# Patient Record
Sex: MALE | Race: WHITE | NOT HISPANIC OR LATINO | URBAN - METROPOLITAN AREA
[De-identification: names, ages, dates, MRNs, and addresses within clinical notes are randomized per-mention and may not be internally consistent; named-entity substitution may affect disease eponyms.]

---

## 2017-01-16 RX ORDER — SERTRALINE HYDROCHLORIDE 100 MG/1
TABLET, FILM COATED ORAL
Qty: 90 TABLET | Refills: 3 | Status: SHIPPED | OUTPATIENT
Start: 2017-01-16 | End: 2017-12-28 | Stop reason: SDUPTHER

## 2017-02-07 DIAGNOSIS — Z00.00 HEALTH CARE MAINTENANCE: Primary | ICD-10-CM

## 2017-02-07 DIAGNOSIS — I10 ESSENTIAL HYPERTENSION: ICD-10-CM

## 2017-02-08 LAB
ALBUMIN SERPL-MCNC: 4.5 G/DL (ref 3.5–5.2)
ALBUMIN/GLOB SERPL: 2.1 G/DL
ALP SERPL-CCNC: 83 U/L (ref 39–117)
ALT SERPL-CCNC: 26 U/L (ref 1–41)
APPEARANCE UR: CLEAR
AST SERPL-CCNC: 23 U/L (ref 1–40)
BACTERIA #/AREA URNS HPF: NORMAL /HPF
BASOPHILS # BLD AUTO: 0.07 10*3/MM3 (ref 0–0.2)
BASOPHILS NFR BLD AUTO: 1 % (ref 0–1.5)
BILIRUB SERPL-MCNC: 0.2 MG/DL (ref 0.1–1.2)
BILIRUB UR QL STRIP: NEGATIVE
BUN SERPL-MCNC: 13 MG/DL (ref 6–20)
BUN/CREAT SERPL: 15.3 (ref 7–25)
CALCIUM SERPL-MCNC: 9.6 MG/DL (ref 8.6–10.5)
CHLORIDE SERPL-SCNC: 99 MMOL/L (ref 98–107)
CHOLEST SERPL-MCNC: 268 MG/DL (ref 0–200)
CO2 SERPL-SCNC: 28.8 MMOL/L (ref 22–29)
COLOR UR: YELLOW
CREAT SERPL-MCNC: 0.85 MG/DL (ref 0.76–1.27)
EOSINOPHIL # BLD AUTO: 0.86 10*3/MM3 (ref 0–0.7)
EOSINOPHIL NFR BLD AUTO: 12.4 % (ref 0.3–6.2)
EPI CELLS #/AREA URNS HPF: NORMAL /HPF
ERYTHROCYTE [DISTWIDTH] IN BLOOD BY AUTOMATED COUNT: 12.6 % (ref 11.5–14.5)
GLOBULIN SER CALC-MCNC: 2.1 GM/DL
GLUCOSE SERPL-MCNC: 65 MG/DL (ref 65–99)
GLUCOSE UR QL: NEGATIVE
HBA1C MFR BLD: 5.3 % (ref 4.8–5.6)
HCT VFR BLD AUTO: 39.1 % (ref 40.4–52.2)
HDLC SERPL-MCNC: 61 MG/DL (ref 40–60)
HGB BLD-MCNC: 12.9 G/DL (ref 13.7–17.6)
HGB UR QL STRIP: NEGATIVE
IMM GRANULOCYTES # BLD: 0.02 10*3/MM3 (ref 0–0.03)
IMM GRANULOCYTES NFR BLD: 0.3 % (ref 0–0.5)
KETONES UR QL STRIP: NEGATIVE
LDLC SERPL CALC-MCNC: 174 MG/DL (ref 0–100)
LEUKOCYTE ESTERASE UR QL STRIP: NEGATIVE
LYMPHOCYTES # BLD AUTO: 2.34 10*3/MM3 (ref 0.9–4.8)
LYMPHOCYTES NFR BLD AUTO: 33.7 % (ref 19.6–45.3)
Lab: NORMAL
Lab: NORMAL
MCH RBC QN AUTO: 31.1 PG (ref 27–32.7)
MCHC RBC AUTO-ENTMCNC: 33 G/DL (ref 32.6–36.4)
MCV RBC AUTO: 94.2 FL (ref 79.8–96.2)
MICRO URNS: NORMAL
MICRO URNS: NORMAL
MONOCYTES # BLD AUTO: 0.69 10*3/MM3 (ref 0.2–1.2)
MONOCYTES NFR BLD AUTO: 9.9 % (ref 5–12)
MUCOUS THREADS URNS QL MICRO: PRESENT /HPF
NEUTROPHILS # BLD AUTO: 2.97 10*3/MM3 (ref 1.9–8.1)
NEUTROPHILS NFR BLD AUTO: 42.7 % (ref 42.7–76)
NITRITE UR QL STRIP: NEGATIVE
PH UR STRIP: 5.5 [PH] (ref 5–7.5)
PLATELET # BLD AUTO: 313 10*3/MM3 (ref 140–500)
POTASSIUM SERPL-SCNC: 4.3 MMOL/L (ref 3.5–5.2)
PROT SERPL-MCNC: 6.6 G/DL (ref 6–8.5)
PROT UR QL STRIP: NEGATIVE
RBC # BLD AUTO: 4.15 10*6/MM3 (ref 4.6–6)
RBC #/AREA URNS HPF: NORMAL /HPF
SODIUM SERPL-SCNC: 142 MMOL/L (ref 136–145)
SP GR UR: 1.03 (ref 1–1.03)
TRIGL SERPL-MCNC: 165 MG/DL (ref 0–150)
TSH SERPL DL<=0.005 MIU/L-ACNC: 1.39 MIU/ML (ref 0.27–4.2)
URINALYSIS REFLEX: NORMAL
UROBILINOGEN UR STRIP-MCNC: 0.2 MG/DL (ref 0.2–1)
VLDLC SERPL CALC-MCNC: 33 MG/DL (ref 5–40)
WBC # BLD AUTO: 6.95 10*3/MM3 (ref 4.5–10.7)
WBC #/AREA URNS HPF: NORMAL /HPF

## 2017-02-09 LAB
FERRITIN SERPL-MCNC: 92.75 NG/ML (ref 30–400)
IRON SATN MFR SERPL: 28 % (ref 20–50)
IRON SERPL-MCNC: 108 MCG/DL (ref 59–158)
TIBC SERPL-MCNC: 385 MCG/DL
UIBC SERPL-MCNC: 277 MCG/DL
VIT B12 SERPL-MCNC: 333 PG/ML (ref 211–946)
WRITTEN AUTHORIZATION: NORMAL

## 2017-02-14 ENCOUNTER — OFFICE VISIT (OUTPATIENT)
Dept: INTERNAL MEDICINE | Facility: CLINIC | Age: 47
End: 2017-02-14

## 2017-02-14 VITALS
WEIGHT: 195 LBS | HEIGHT: 76 IN | BODY MASS INDEX: 23.75 KG/M2 | DIASTOLIC BLOOD PRESSURE: 80 MMHG | RESPIRATION RATE: 18 BRPM | HEART RATE: 65 BPM | SYSTOLIC BLOOD PRESSURE: 134 MMHG | OXYGEN SATURATION: 98 %

## 2017-02-14 DIAGNOSIS — E53.8 LOW VITAMIN B12 LEVEL: ICD-10-CM

## 2017-02-14 DIAGNOSIS — Z00.00 HEALTH MAINTENANCE EXAMINATION: Primary | ICD-10-CM

## 2017-02-14 DIAGNOSIS — D53.9 MACROCYTIC ANEMIA: ICD-10-CM

## 2017-02-14 DIAGNOSIS — I10 ESSENTIAL HYPERTENSION: ICD-10-CM

## 2017-02-14 DIAGNOSIS — J45.20 MILD INTERMITTENT ASTHMA WITHOUT COMPLICATION: ICD-10-CM

## 2017-02-14 PROBLEM — R79.89 LOW VITAMIN B12 LEVEL: Status: ACTIVE | Noted: 2017-02-14

## 2017-02-14 PROBLEM — D75.89 MACROCYTOSIS WITHOUT ANEMIA: Status: ACTIVE | Noted: 2017-02-14

## 2017-02-14 PROCEDURE — 99396 PREV VISIT EST AGE 40-64: CPT | Performed by: INTERNAL MEDICINE

## 2017-02-14 NOTE — PROGRESS NOTES
Chief Complaint  Jermaine Pichardo is a 47 y.o. male who presents for Annual Exam (CPE)  .    History of Present Illness   Jermaine is here for routine CPE.  His left ear has been bothering him.  It feels like it might be impacted.  No other new concerns.    Pneumovax - not yet.    Exercise: just his work.      For a year he has had hot flashes and sweats.  It can be day or night.  He could go for a week without any but then it will happen.  It lasts about 2 minutes.  He is still on suboxone from Dr. Burroughs.      Immunization History   Administered Date(s) Administered   • Td 03/01/2009       Review of Systems   Constitutional: Negative for chills, fatigue and fever.   HENT: Positive for ear pain. Negative for hearing loss, sore throat, tinnitus, trouble swallowing and voice change.    Eyes: Negative for visual disturbance.   Respiratory: Positive for shortness of breath (occasionally). Negative for cough.    Cardiovascular: Negative for chest pain, palpitations and leg swelling.   Gastrointestinal: Negative for abdominal distention, abdominal pain, anal bleeding, blood in stool, constipation, diarrhea, nausea and vomiting.   Endocrine: Negative for polydipsia and polyuria.   Genitourinary: Negative for decreased urine volume, dysuria and hematuria.   Musculoskeletal: Negative for arthralgias and myalgias.   Skin: Negative for rash.   Neurological: Negative for dizziness, syncope, light-headedness and headaches.   Hematological: Negative for adenopathy. Does not bruise/bleed easily.   Psychiatric/Behavioral: Negative for confusion and dysphoric mood. The patient is nervous/anxious.        Patient Active Problem List   Diagnosis   • Actinic keratosis   • Anxiety   • Asthma   • Essential hypertension   • Primary insomnia   • Macrocytic anemia   • Low vitamin B12 level       Past Medical History   Diagnosis Date   • Actinic keratosis    • Anxiety    • Asthma        Past Surgical History   Procedure Laterality Date   • Mandible  "surgery         Family History   Problem Relation Age of Onset   • Diabetes Mother    • Colon cancer Paternal Grandfather        Social History     Social History   • Marital status:      Spouse name: N/A   • Number of children: 3   • Years of education: N/A     Occupational History   • [employer] repairman [employer]      Social History Main Topics   • Smoking status: Current Every Day Smoker     Types: Cigarettes   • Smokeless tobacco: Never Used      Comment: started at age 41   • Alcohol use Yes      Comment: 7 / week   • Drug use: No   • Sexual activity: Not on file     Other Topics Concern   • Not on file     Social History Narrative       Current Outpatient Prescriptions on File Prior to Visit   Medication Sig Dispense Refill   • lisinopril (PRINIVIL,ZESTRIL) 5 MG tablet Take 1 tablet by mouth daily. 90 tablet 3   • PROAIR  (90 BASE) MCG/ACT inhaler INHALE 1 PUFF BY MOUTH EVERY 4 HOURS AS NEEDED 8.5 inhaler 3   • sertraline (ZOLOFT) 100 MG tablet TAKE 1 TABLET EVERY DAY 90 tablet 3   • SUBOXONE 8-2 MG film film DISSOLVE 1 AND 1/2 FILM UNDER THE TONGUE EVERY DAY  0   • [DISCONTINUED] melatonin 5 MG tablet tablet Take 1 tablet (5 mg total) by mouth nightly.  0   • [DISCONTINUED] montelukast (SINGULAIR) 10 MG tablet Take 1 tablet by mouth daily.       No current facility-administered medications on file prior to visit.        No Known Allergies    Vitals:    02/14/17 1255   BP: 134/80   Pulse: 65   Resp: 18   SpO2: 98%   Weight: 195 lb (88.5 kg)   Height: 76\" (193 cm)       Body mass index is 23.74 kg/(m^2).    Objective   Physical Exam   Constitutional: He is oriented to person, place, and time. He appears well-developed and well-nourished. No distress.   HENT:   Head: Normocephalic and atraumatic.   Right Ear: Hearing and external ear normal.   Left Ear: Hearing and external ear normal.   Nose: Nose normal.   Mouth/Throat: Oropharynx is clear and moist and mucous membranes are normal.   Left cerumen " impaction   Eyes: Conjunctivae, EOM and lids are normal. Pupils are equal, round, and reactive to light. No scleral icterus.   Neck: Trachea normal and normal range of motion. Neck supple.   Cardiovascular: Normal rate, regular rhythm and normal heart sounds.  Exam reveals no gallop and no friction rub.    No murmur heard.  Pulses:       Carotid pulses are 2+ on the right side, and 2+ on the left side.       Femoral pulses are 2+ on the right side, and 2+ on the left side.       Dorsalis pedis pulses are 2+ on the right side, and 2+ on the left side.        Posterior tibial pulses are 2+ on the right side, and 2+ on the left side.   Pulmonary/Chest: Effort normal and breath sounds normal. No respiratory distress. He has no wheezes. He has no rales.   Abdominal: Soft. Normal appearance and bowel sounds are normal. He exhibits no distension and no mass. There is no tenderness.   Musculoskeletal: Normal range of motion. He exhibits no edema.       Vascular Status -  His exam exhibits no right foot edema. His exam exhibits no left foot edema.   Skin Integrity  -  His right foot skin is intact.     Jermaine 's left foot skin is intact. .  Lymphadenopathy:     He has no cervical adenopathy.        Right: No inguinal and no supraclavicular adenopathy present.        Left: No inguinal and no supraclavicular adenopathy present.   Neurological: He is alert and oriented to person, place, and time. He has normal strength. No cranial nerve deficit. He exhibits normal muscle tone. Coordination and gait normal.   Skin: Skin is warm and dry. No rash noted.   Psychiatric: He has a normal mood and affect. His speech is normal and behavior is normal. Judgment and thought content normal. Cognition and memory are normal.   Vitals reviewed.        Results for orders placed or performed in visit on 02/07/17   Comprehensive Metabolic Panel   Result Value Ref Range    Glucose 65 65 - 99 mg/dL    BUN 13 6 - 20 mg/dL    Creatinine 0.85 0.76 -  1.27 mg/dL    eGFR Non African Am 97 >60 mL/min/1.73    eGFR African Am 118 >60 mL/min/1.73    BUN/Creatinine Ratio 15.3 7.0 - 25.0    Sodium 142 136 - 145 mmol/L    Potassium 4.3 3.5 - 5.2 mmol/L    Chloride 99 98 - 107 mmol/L    Total CO2 28.8 22.0 - 29.0 mmol/L    Calcium 9.6 8.6 - 10.5 mg/dL    Total Protein 6.6 6.0 - 8.5 g/dL    Albumin 4.50 3.50 - 5.20 g/dL    Globulin 2.1 gm/dL    A/G Ratio 2.1 g/dL    Total Bilirubin 0.2 0.1 - 1.2 mg/dL    Alkaline Phosphatase 83 39 - 117 U/L    AST (SGOT) 23 1 - 40 U/L    ALT (SGPT) 26 1 - 41 U/L   Lipid Panel   Result Value Ref Range    Total Cholesterol 268 (H) 0 - 200 mg/dL    Triglycerides 165 (H) 0 - 150 mg/dL    HDL Cholesterol 61 (H) 40 - 60 mg/dL    VLDL Cholesterol 33 5 - 40 mg/dL    LDL Cholesterol  174 (H) 0 - 100 mg/dL   TSH Rfx On Abnormal To Free T4   Result Value Ref Range    TSH 1.39 0.27 - 4.2 mIU/mL   UA / M With / Rflx Culture([facility] ONLY)   Result Value Ref Range    Specific Gravity, UA 1.026 1.005 - 1.030    pH, UA 5.5 5.0 - 7.5    Color, UA Yellow Yellow    Appearance, UA Clear Clear    Leukocytes, UA Negative Negative    Protein Negative Negative/Trace    Glucose, UA Negative Negative    Ketones Negative Negative    Blood, UA Negative Negative    Bilirubin, UA Negative Negative    Urobilinogen, UA 0.2 0.2 - 1.0 mg/dL    Nitrite, UA Negative Negative    Microscopic Examination Comment     MICROSCOPIC EXAMINATION See below:     Urinalysis Reflex Comment    Hemoglobin A1c   Result Value Ref Range    Hemoglobin A1C 5.30 4.80 - 5.60 %   Microscopic Examination   Result Value Ref Range    WBC, UA 0-5 0 - 5 /hpf    RBC, UA None seen 0 - 2 /hpf    Epithelial Cells (non renal) 0-10 0 - 10 /hpf    Mucus, UA Present Not Estab. /hpf    Bacteria, UA None seen None seen/Few /hpf   Verbal Order   Result Value Ref Range    See below: Comment:     Additional Test(s) Requested Comment:    Iron Profile   Result Value Ref Range    TIBC 385 mcg/dL    UIBC 277 mcg/dL     Iron 108 59 - 158 mcg/dL    Iron Saturation 28 20 - 50 %   Ferritin   Result Value Ref Range    Ferritin 92.75 30.00 - 400.00 ng/mL   Vitamin B12   Result Value Ref Range    Vitamin B-12 333 211 - 946 pg/mL   Written Authorization   Result Value Ref Range    Written Authorization Comment    CBC & Differential   Result Value Ref Range    WBC 6.95 4.50 - 10.70 10*3/mm3    RBC 4.15 (L) 4.60 - 6.00 10*6/mm3    Hemoglobin 12.9 (L) 13.7 - 17.6 g/dL    Hematocrit 39.1 (L) 40.4 - 52.2 %    MCV 94.2 79.8 - 96.2 fL    MCH 31.1 27.0 - 32.7 pg    MCHC 33.0 32.6 - 36.4 g/dL    RDW 12.6 11.5 - 14.5 %    Platelets 313 140 - 500 10*3/mm3    Neutrophil Rel % 42.7 42.7 - 76.0 %    Lymphocyte Rel % 33.7 19.6 - 45.3 %    Monocyte Rel % 9.9 5.0 - 12.0 %    Eosinophil Rel % 12.4 (H) 0.3 - 6.2 %    Basophil Rel % 1.0 0.0 - 1.5 %    Neutrophils Absolute 2.97 1.90 - 8.10 10*3/mm3    Lymphocytes Absolute 2.34 0.90 - 4.80 10*3/mm3    Monocytes Absolute 0.69 0.20 - 1.20 10*3/mm3    Eosinophils Absolute 0.86 (H) 0.00 - 0.70 10*3/mm3    Basophils Absolute 0.07 0.00 - 0.20 10*3/mm3    Immature Granulocyte Rel % 0.3 0.0 - 0.5 %    Immature Grans Absolute 0.02 0.00 - 0.03 10*3/mm3       Assessment/Plan   Diagnoses and all orders for this visit:    Health maintenance examination  -     Cancel: Pneumococcal Polysaccharide Vaccine 23-Valent Greater Than or Equal To 3yo Subcutaneous / IM    Macrocytic anemia  -     CBC & Differential; Future  -     Vitamin B12; Future    Low vitamin B12 level  -     CBC & Differential; Future  -     Vitamin B12; Future    Essential hypertension    Mild intermittent asthma without complication        Discussion/Summary  Jermaine is here for routine CPE.  His bp is controlled.  His asthma has been well controlled.  We discussed that his eosinophil count is elevated but this is not uncommon with asthma.   He has a new macrocytosis and his B12 is mildly low.  Iron levels are normal.   I've asked him to take 1000 mcg OTC B12  SL daily.  We will recheck labs in 6 weeks.  He is also complaining of flushing and sweats.  I suspect this is secondary to his suboxone.  I had wanted him to get a pneumovax but we are out of this today.  He will hopefully be able to get this is six weeks when he comes in for labs.    Return in about 6 weeks (around 3/28/2017) for labs only & ear wax removal.

## 2017-03-28 ENCOUNTER — RESULTS ENCOUNTER (OUTPATIENT)
Dept: INTERNAL MEDICINE | Facility: CLINIC | Age: 47
End: 2017-03-28

## 2017-03-28 DIAGNOSIS — D53.9 MACROCYTIC ANEMIA: ICD-10-CM

## 2017-03-28 DIAGNOSIS — E53.8 LOW VITAMIN B12 LEVEL: ICD-10-CM

## 2017-03-30 LAB
BASOPHILS # BLD AUTO: 0.09 10*3/MM3 (ref 0–0.2)
BASOPHILS NFR BLD AUTO: 0.9 % (ref 0–1.5)
EOSINOPHIL # BLD AUTO: 0.99 10*3/MM3 (ref 0–0.7)
EOSINOPHIL NFR BLD AUTO: 9.5 % (ref 0.3–6.2)
ERYTHROCYTE [DISTWIDTH] IN BLOOD BY AUTOMATED COUNT: 12.7 % (ref 11.5–14.5)
HCT VFR BLD AUTO: 41.2 % (ref 40.4–52.2)
HGB BLD-MCNC: 13.9 G/DL (ref 13.7–17.6)
IMM GRANULOCYTES # BLD: 0 10*3/MM3 (ref 0–0.03)
IMM GRANULOCYTES NFR BLD: 0 % (ref 0–0.5)
LYMPHOCYTES # BLD AUTO: 1.99 10*3/MM3 (ref 0.9–4.8)
LYMPHOCYTES NFR BLD AUTO: 19.1 % (ref 19.6–45.3)
MCH RBC QN AUTO: 31.8 PG (ref 27–32.7)
MCHC RBC AUTO-ENTMCNC: 33.7 G/DL (ref 32.6–36.4)
MCV RBC AUTO: 94.3 FL (ref 79.8–96.2)
MONOCYTES # BLD AUTO: 0.67 10*3/MM3 (ref 0.2–1.2)
MONOCYTES NFR BLD AUTO: 6.4 % (ref 5–12)
NEUTROPHILS # BLD AUTO: 6.66 10*3/MM3 (ref 1.9–8.1)
NEUTROPHILS NFR BLD AUTO: 64.1 % (ref 42.7–76)
PLATELET # BLD AUTO: 304 10*3/MM3 (ref 140–500)
RBC # BLD AUTO: 4.37 10*6/MM3 (ref 4.6–6)
VIT B12 SERPL-MCNC: 437 PG/ML (ref 211–946)
WBC # BLD AUTO: 10.4 10*3/MM3 (ref 4.5–10.7)

## 2017-04-19 ENCOUNTER — TELEPHONE (OUTPATIENT)
Dept: INTERNAL MEDICINE | Facility: CLINIC | Age: 47
End: 2017-04-19

## 2017-04-19 NOTE — TELEPHONE ENCOUNTER
Pt called to cancel his apt, was sent across [facility] for work, called at 1:52pm to cancel.

## 2017-04-27 ENCOUNTER — OFFICE VISIT (OUTPATIENT)
Dept: INTERNAL MEDICINE | Facility: CLINIC | Age: 47
End: 2017-04-27

## 2017-04-27 VITALS
BODY MASS INDEX: 23.99 KG/M2 | HEIGHT: 76 IN | RESPIRATION RATE: 18 BRPM | SYSTOLIC BLOOD PRESSURE: 114 MMHG | DIASTOLIC BLOOD PRESSURE: 78 MMHG | HEART RATE: 81 BPM | OXYGEN SATURATION: 98 % | WEIGHT: 197 LBS

## 2017-04-27 DIAGNOSIS — E53.8 LOW VITAMIN B12 LEVEL: Primary | ICD-10-CM

## 2017-04-27 DIAGNOSIS — H61.22 IMPACTED CERUMEN OF LEFT EAR: ICD-10-CM

## 2017-04-27 DIAGNOSIS — R60.0 BILATERAL LOWER EXTREMITY EDEMA: ICD-10-CM

## 2017-04-27 DIAGNOSIS — Z23 NEED FOR PNEUMOCOCCAL VACCINATION: ICD-10-CM

## 2017-04-27 PROCEDURE — 90732 PPSV23 VACC 2 YRS+ SUBQ/IM: CPT | Performed by: INTERNAL MEDICINE

## 2017-04-27 PROCEDURE — 99213 OFFICE O/P EST LOW 20 MIN: CPT | Performed by: INTERNAL MEDICINE

## 2017-04-27 PROCEDURE — 90471 IMMUNIZATION ADMIN: CPT | Performed by: INTERNAL MEDICINE

## 2017-04-27 PROCEDURE — 69210 REMOVE IMPACTED EAR WAX UNI: CPT | Performed by: INTERNAL MEDICINE

## 2017-04-27 RX ORDER — LANOLIN ALCOHOL/MO/W.PET/CERES
1000 CREAM (GRAM) TOPICAL DAILY
Qty: 30 TABLET | Refills: 6
Start: 2017-04-27 | End: 2019-01-08

## 2017-04-27 NOTE — PATIENT INSTRUCTIONS
Pneumococcal Vaccine, Polyvalent solution for injection  What is this medicine?  PNEUMOCOCCAL VACCINE, POLYVALENT (DARCI mo LESLEE al alejandra BENZ, deric vicente) is a vaccine to prevent pneumococcus bacteria infection. These bacteria are a major cause of ear infections, Strep throat infections, and serious pneumonia, meningitis, or blood infections worldwide. These vaccines help the body to produce antibodies (protective substances) that help your body defend against these bacteria. This vaccine is recommended for people 2 years of age and older with health problems. It is also recommended for all adults over 50 years old. This vaccine will not treat an infection.  This medicine may be used for other purposes; ask your health care provider or pharmacist if you have questions.  COMMON BRAND NAME(S): Pneumovax 23  What should I tell my health care provider before I take this medicine?  They need to know if you have any of these conditions:  -bleeding problems  -bone marrow or organ transplant  -cancer, Hodgkin's disease  -fever  -infection  -immune system problems  -low platelet count in the blood  -seizures  -an unusual or allergic reaction to pneumococcal vaccine, diphtheria toxoid, other vaccines, latex, other medicines, foods, dyes, or preservatives  -pregnant or trying to get pregnant  -breast-feeding  How should I use this medicine?  This vaccine is for injection into a muscle or under the skin. It is given by a health care professional.  A copy of Vaccine Information Statements will be given before each vaccination. Read this sheet carefully each time. The sheet may change frequently.  Talk to your pediatrician regarding the use of this medicine in children. While this drug may be prescribed for children as young as 2 years of age for selected conditions, precautions do apply.  Overdosage: If you think you have taken too much of this medicine contact a poison control center or emergency room at once.  NOTE: This  medicine is only for you. Do not share this medicine with others.  What if I miss a dose?  It is important not to miss your dose. Call your doctor or health care professional if you are unable to keep an appointment.  What may interact with this medicine?  -medicines for cancer chemotherapy  -medicines that suppress your immune function  -medicines that treat or prevent blood clots like warfarin, enoxaparin, and dalteparin  -steroid medicines like prednisone or cortisone  This list may not describe all possible interactions. Give your health care provider a list of all the medicines, herbs, non-prescription drugs, or dietary supplements you use. Also tell them if you smoke, drink alcohol, or use illegal drugs. Some items may interact with your medicine.  What should I watch for while using this medicine?  Mild fever and pain should go away in 3 days or less. Report any unusual symptoms to your doctor or health care professional.  What side effects may I notice from receiving this medicine?  Side effects that you should report to your doctor or health care professional as soon as possible:  -allergic reactions like skin rash, itching or hives, swelling of the face, lips, or tongue  -breathing problems  -confused  -fever over 102 degrees F  -pain, tingling, numbness in the hands or feet  -seizures  -unusual bleeding or bruising  -unusual muscle weakness  Side effects that usually do not require medical attention (report to your doctor or health care professional if they continue or are bothersome):  -aches and pains  -diarrhea  -fever of 102 degrees F or less  -headache  -irritable  -loss of appetite  -pain, tender at site where injected  -trouble sleeping  This list may not describe all possible side effects. Call your doctor for medical advice about side effects. You may report side effects to FDA at 1-989-FDA-9683.  Where should I keep my medicine?  This does not apply. This vaccine is given in a clinic, pharmacy,  "doctor's office, or other health care setting and will not be stored at home.  NOTE: This sheet is a summary. It may not cover all possible information. If you have questions about this medicine, talk to your doctor, pharmacist, or health care provider.     © 2017, Elsevier/Gold Standard. (2009-07-24 14:32:37)    Low-Sodium Eating Plan  Sodium raises blood pressure and causes water to be held in the body. Getting less sodium from food will help lower your blood pressure, reduce any swelling, and protect your heart, liver, and kidneys. We get sodium by adding salt (sodium chloride) to food. Most of our sodium comes from canned, boxed, and frozen foods. Restaurant foods, fast foods, and pizza are also very high in sodium. Even if you take medicine to lower your blood pressure or to reduce fluid in your body, getting less sodium from your food is important.  WHAT IS MY PLAN?  Most people should limit their sodium intake to 2,300 mg a day. Your health care provider recommends that you limit your sodium intake to __________ a day.   WHAT DO I NEED TO KNOW ABOUT THIS EATING PLAN?  For the low-sodium eating plan, you will follow these general guidelines:  · Choose foods with a % Daily Value for sodium of less than 5% (as listed on the food label).    · Use salt-free seasonings or herbs instead of table salt or sea salt.    · Check with your health care provider or pharmacist before using salt substitutes.    · Eat fresh foods.  · Eat more vegetables and fruits.  · Limit canned vegetables. If you do use them, rinse them well to decrease the sodium.    · Limit cheese to 1 oz (28 g) per day.     · Eat lower-sodium products, often labeled as \"lower sodium\" or \"no salt added.\"  · Avoid foods that contain monosodium glutamate (MSG). MSG is sometimes added to Chinese food and some canned foods.    · Check food labels (Nutrition Facts labels) on foods to learn how much sodium is in one serving.  · Eat more home-cooked food and " less restaurant, buffet, and fast food.   · When eating at a restaurant, ask that your food be prepared with less salt, or no salt if possible.    HOW DO I READ FOOD LABELS FOR SODIUM INFORMATION?  The Nutrition Facts label lists the amount of sodium in one serving of the food. If you eat more than one serving, you must multiply the listed amount of sodium by the number of servings.  Food labels may also identify foods as:  · Sodium free--Less than 5 mg in a serving.  · Very low sodium--35 mg or less in a serving.  · Low sodium--140 mg or less in a serving.  · Light in sodium--50% less sodium in a serving. For example, if a food that usually has 300 mg of sodium is changed to become light in sodium, it will have 150 mg of sodium.  · Reduced sodium--25% less sodium in a serving. For example, if a food that usually has 400 mg of sodium is changed to reduced sodium, it will have 300 mg of sodium.  WHAT FOODS CAN I EAT?  Grains   Low-sodium cereals, including oats, puffed wheat and rice, and shredded wheat cereals. Low-sodium crackers. Unsalted rice and pasta. Lower-sodium bread.   Vegetables   Frozen or fresh vegetables. Low-sodium or reduced-sodium canned vegetables. Low-sodium or reduced-sodium tomato sauce and paste. Low-sodium or reduced-sodium tomato and vegetable juices.   Fruits   Fresh, frozen, and canned fruit. Fruit juice.   Meat and Other Protein Products   Low-sodium canned tuna and salmon. Fresh or frozen meat, poultry, seafood, and fish. Lamb. Unsalted nuts. Dried beans, peas, and lentils without added salt. Unsalted canned beans. Homemade soups without salt. Eggs.   Dairy   Milk. Soy milk. Ricotta cheese. Low-sodium or reduced-sodium cheeses. Yogurt.   Condiments   Fresh and dried herbs and spices. Salt-free seasonings. Onion and garlic powders. Low-sodium varieties of mustard and ketchup. Fresh or refrigerated horseradish. Lemon juice.   Fats and Oils    Reduced-sodium salad dressings. Unsalted butter.     Other   Unsalted popcorn and pretzels.   The items listed above may not be a complete list of recommended foods or beverages. Contact your dietitian for more options.  WHAT FOODS ARE NOT RECOMMENDED?  Grains   Instant hot cereals. Bread stuffing, pancake, and biscuit mixes. Croutons. Seasoned rice or pasta mixes. Noodle soup cups. Boxed or frozen macaroni and cheese. Self-rising flour. Regular salted crackers.  Vegetables   Regular canned vegetables. Regular canned tomato sauce and paste. Regular tomato and vegetable juices. Frozen vegetables in sauces. Salted French fries. Olives. Pickles. Relishes. Sauerkraut. Salsa.  Meat and Other Protein Products   Salted, canned, smoked, spiced, or pickled meats, seafood, or fish. Mclean, ham, sausage, hot dogs, corned beef, chipped beef, and packaged luncheon meats. Salt pork. Jerky. Pickled herring. Anchovies, regular canned tuna, and sardines. Salted nuts.  Dairy   Processed cheese and cheese spreads. Cheese curds. Blue cheese and cottage cheese. Buttermilk.   Condiments   Onion and garlic salt, seasoned salt, table salt, and sea salt. Canned and packaged gravies. Worcestershire sauce. Tartar sauce. Barbecue sauce. Teriyaki sauce. Soy sauce, including reduced sodium. Steak sauce. Fish sauce. Oyster sauce. Cocktail sauce. Horseradish that you find on the shelf. Regular ketchup and mustard. Meat flavorings and tenderizers. Bouillon cubes. Hot sauce. Tabasco sauce. Marinades. Taco seasonings. Relishes.  Fats and Oils    Regular salad dressings. Salted butter. Margarine. Ghee. Mclean fat.   Other   Potato and tortilla chips. Corn chips and puffs. Salted popcorn and pretzels. Canned or dried soups. Pizza. Frozen entrees and pot pies.    The items listed above may not be a complete list of foods and beverages to avoid. Contact your dietitian for more information.     This information is not intended to replace advice given to you by your health care provider. Make sure you  discuss any questions you have with your health care provider.     Document Released: 06/09/2003 Document Revised: 01/08/2016 Document Reviewed: 10/22/2014  Elsevier Interactive Patient Education ©2016 [employer]

## 2017-04-27 NOTE — PROGRESS NOTES
Chief Complaint  Jermaine Pichardo is a 47 y.o. male who presents for Cerumen Impaction (earwax removal); Results (recheck cbc, b12); and Follow-up (6 week)  .    History of Present Illness   He is here for follow up.  On his B12 and CBC.  He has been taking oral B12.  He has noticed some swelling in his ankles and feet.  Recently, this has gotten a little worse.  He is on his feet all day for work.  He eats a lot of salt.  He adds salt to his food all the time.        He has been working to get the wax out of his left ear and has been able to get some out.      Review of Systems   Constitutional: Positive for diaphoresis. Negative for fatigue.   HENT: Negative for ear discharge and ear pain.    Cardiovascular: Positive for leg swelling. Negative for chest pain and palpitations.       Patient Active Problem List   Diagnosis   • Actinic keratosis   • Anxiety   • Asthma   • Essential hypertension   • Primary insomnia   • Macrocytic anemia   • Low vitamin B12 level       Past Medical History:   Diagnosis Date   • Actinic keratosis    • Anxiety    • Asthma        Past Surgical History:   Procedure Laterality Date   • MANDIBLE SURGERY         Family History   Problem Relation Age of Onset   • Diabetes Mother    • Colon cancer Paternal Grandfather        Social History     Social History   • Marital status:      Spouse name: N/A   • Number of children: 3   • Years of education: N/A     Occupational History   • 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  "90 tablet 3   • PROAIR  (90 BASE) MCG/ACT inhaler INHALE 1 PUFF BY MOUTH EVERY 4 HOURS AS NEEDED 8.5 inhaler 3   • sertraline (ZOLOFT) 100 MG tablet TAKE 1 TABLET EVERY DAY 90 tablet 3   • SUBOXONE 8-2 MG film film DISSOLVE 1 AND 1/2 FILM UNDER THE TONGUE EVERY DAY  0     No current facility-administered medications on file prior to visit.        No Known Allergies    Vitals:    04/27/17 0808   BP: 114/78   Pulse: 81   Resp: 18   SpO2: 98%   Weight: 197 lb (89.4 kg)   Height: 76\" (193 cm)       Body mass index is 23.98 kg/(m^2).    Objective   Physical Exam   Constitutional: He appears well-developed and well-nourished. No distress.   HENT:   Head: Normocephalic and atraumatic.   Right Ear: Tympanic membrane, external ear and ear canal normal.   Left Ear: Tympanic membrane, external ear and ear canal normal.   Cerumen obscuring the left TM initially.  Easily removed with curette.  Normal TM and canal noted.         Results for orders placed or performed in visit on 03/28/17   Vitamin B12   Result Value Ref Range    Vitamin B-12 437 211 - 946 pg/mL   CBC & Differential   Result Value Ref Range    WBC 10.40 4.50 - 10.70 10*3/mm3    RBC 4.37 (L) 4.60 - 6.00 10*6/mm3    Hemoglobin 13.9 13.7 - 17.6 g/dL    Hematocrit 41.2 40.4 - 52.2 %    MCV 94.3 79.8 - 96.2 fL    MCH 31.8 27.0 - 32.7 pg    MCHC 33.7 32.6 - 36.4 g/dL    RDW 12.7 11.5 - 14.5 %    Platelets 304 140 - 500 10*3/mm3    Neutrophil Rel % 64.1 42.7 - 76.0 %    Lymphocyte Rel % 19.1 (L) 19.6 - 45.3 %    Monocyte Rel % 6.4 5.0 - 12.0 %    Eosinophil Rel % 9.5 (H) 0.3 - 6.2 %    Basophil Rel % 0.9 0.0 - 1.5 %    Neutrophils Absolute 6.66 1.90 - 8.10 10*3/mm3    Lymphocytes Absolute 1.99 0.90 - 4.80 10*3/mm3    Monocytes Absolute 0.67 0.20 - 1.20 10*3/mm3    Eosinophils Absolute 0.99 (H) 0.00 - 0.70 10*3/mm3    Basophils Absolute 0.09 0.00 - 0.20 10*3/mm3    Immature Granulocyte Rel % 0.0 0.0 - 0.5 %    Immature Grans Absolute 0.00 0.00 - 0.03 10*3/mm3 "     Ear Cerumen Removal Instrumentation  Date/Time: 4/27/2017 8:36 AM  Performed by: SAM AMANDA  Authorized by: SAM AMANDA  Consent: Verbal consent obtained.  Consent given by: patient  Patient understanding: patient states understanding of the procedure being performed  Patient identity confirmed: verbally with patient  Local anesthetic: none  Location details: left ear  Procedure type: curette  Patient sedated: no  Patient tolerance: Patient tolerated the procedure well with no immediate complications            Assessment/Plan   Diagnoses and all orders for this visit:    Low vitamin B12 level  -     vitamin B-12 (CYANOCOBALAMIN) 1000 MCG tablet; Take 1 tablet by mouth Daily.    Bilateral lower extremity edema    Impacted cerumen of left ear  -     Ear Cerumen Removal Instrumentation    Need for pneumococcal vaccination  -     Pneumococcal Polysaccharide Vaccine 23-Valent Greater Than or Equal To 1yo Subcutaneous / IM        Discussion/Summary  Jermaine is here for follow up on his blood counts.  His hgb did improve with oral B12.  Advised to continue the oral daily B12.  I have advised him to significantly reduce the salt in his diet.  He needs to stop adding salt to his food and to really look at the salt content of the food he is eating.  I suspect this plus being on his feet all day are contributing to his mild edema.  I have also advised him to look into athletic compression socks to help with the edema.    Return for Annual physical, with nonfasting labs prior.

## 2017-07-10 ENCOUNTER — TELEPHONE (OUTPATIENT)
Dept: INTERNAL MEDICINE | Facility: CLINIC | Age: 47
End: 2017-07-10

## 2017-07-10 NOTE — TELEPHONE ENCOUNTER
Pt states that he needs his [facility] paperwork extended from one day to 5-7 days. Pt states that his asthma is not getting any better and it is affecting his employment.

## 2017-07-10 NOTE — TELEPHONE ENCOUNTER
Pt informed, via voicemail, that he will need to get a copy of the FMLA forms and schedule an OV with .

## 2017-07-12 ENCOUNTER — OFFICE VISIT (OUTPATIENT)
Dept: INTERNAL MEDICINE | Facility: CLINIC | Age: 47
End: 2017-07-12

## 2017-07-12 VITALS
BODY MASS INDEX: 23.62 KG/M2 | RESPIRATION RATE: 18 BRPM | HEIGHT: 76 IN | WEIGHT: 194 LBS | DIASTOLIC BLOOD PRESSURE: 80 MMHG | SYSTOLIC BLOOD PRESSURE: 142 MMHG | OXYGEN SATURATION: 98 % | HEART RATE: 76 BPM

## 2017-07-12 DIAGNOSIS — J45.40 MODERATE PERSISTENT ASTHMA WITHOUT COMPLICATION: Primary | ICD-10-CM

## 2017-07-12 PROCEDURE — 99213 OFFICE O/P EST LOW 20 MIN: CPT | Performed by: INTERNAL MEDICINE

## 2017-07-12 RX ORDER — MONTELUKAST SODIUM 10 MG/1
10 TABLET ORAL NIGHTLY
Qty: 30 TABLET | Refills: 6 | Status: SHIPPED | OUTPATIENT
Start: 2017-07-12 | End: 2019-01-08

## 2017-07-12 NOTE — PROGRESS NOTES
Chief Complaint  Jermaine Pichardo is a 47 y.o. male who presents for Asthma ([facility] paperwork, to change from 1 day to about 2-4 days for his asthma that make him miss work.)  .    History of Present Illness   Jermaine is here to follow up on his asthma which is not controlled.  He is needing his inhaler twice a day every day.  He does wake up at night with symptoms several times a week.  He gets short of breath. This is impacting his job.   In the winter he doesn't need his proair as often.        Review of Systems   Constitutional: Negative for chills and fever.   Respiratory: Positive for cough and shortness of breath. Negative for wheezing.        Patient Active Problem List   Diagnosis   • Actinic keratosis   • Anxiety   • Asthma   • Essential hypertension   • Primary insomnia   • Macrocytic anemia   • Low vitamin B12 level       Past Medical History:   Diagnosis Date   • Actinic keratosis    • Anxiety    • Asthma        Past Surgical History:   Procedure Laterality Date   • MANDIBLE SURGERY         Family History   Problem Relation Age of Onset   • Diabetes Mother    • Colon cancer Paternal Grandfather        Social History     Social History   • Marital status:      Spouse name: N/A   • Number of children: 3   • Years of education: N/A     Occupational History   • 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 90 tablet 3   • PROAIR  (90 BASE) MCG/ACT inhaler INHALE 1 PUFF BY MOUTH EVERY 4 HOURS AS NEEDED 8.5 inhaler 3   • sertraline  "(ZOLOFT) 100 MG tablet TAKE 1 TABLET EVERY DAY 90 tablet 3   • vitamin B-12 (CYANOCOBALAMIN) 1000 MCG tablet Take 1 tablet by mouth Daily. 30 tablet 6   • SUBOXONE 8-2 MG film film DISSOLVE 1 AND 1/2 FILM UNDER THE TONGUE EVERY DAY  0     No current facility-administered medications on file prior to visit.        No Known Allergies    Vitals:    07/12/17 1138   BP: 142/80   Pulse: 76   Resp: 18   SpO2: 98%   Weight: 194 lb (88 kg)   Height: 76\" (193 cm)       Body mass index is 23.61 kg/(m^2).    Objective   Physical Exam   Constitutional: He is oriented to person, place, and time. He appears well-developed and well-nourished. No distress.   HENT:   Head: Normocephalic and atraumatic.   Neck: Normal range of motion. Neck supple.   Cardiovascular: Normal rate and regular rhythm.    No murmur heard.  Pulmonary/Chest: Effort normal and breath sounds normal. No respiratory distress. He has no wheezes. He has no rales.   Lymphadenopathy:     He has no cervical adenopathy.   Neurological: He is alert and oriented to person, place, and time. No cranial nerve deficit.   Psychiatric: He has a normal mood and affect. His behavior is normal.       Results for orders placed or performed in visit on 03/28/17   Vitamin B12   Result Value Ref Range    Vitamin B-12 437 211 - 946 pg/mL   CBC & Differential   Result Value Ref Range    WBC 10.40 4.50 - 10.70 10*3/mm3    RBC 4.37 (L) 4.60 - 6.00 10*6/mm3    Hemoglobin 13.9 13.7 - 17.6 g/dL    Hematocrit 41.2 40.4 - 52.2 %    MCV 94.3 79.8 - 96.2 fL    MCH 31.8 27.0 - 32.7 pg    MCHC 33.7 32.6 - 36.4 g/dL    RDW 12.7 11.5 - 14.5 %    Platelets 304 140 - 500 10*3/mm3    Neutrophil Rel % 64.1 42.7 - 76.0 %    Lymphocyte Rel % 19.1 (L) 19.6 - 45.3 %    Monocyte Rel % 6.4 5.0 - 12.0 %    Eosinophil Rel % 9.5 (H) 0.3 - 6.2 %    Basophil Rel % 0.9 0.0 - 1.5 %    Neutrophils Absolute 6.66 1.90 - 8.10 10*3/mm3    Lymphocytes Absolute 1.99 0.90 - 4.80 10*3/mm3    Monocytes Absolute 0.67 0.20 - " 1.20 10*3/mm3    Eosinophils Absolute 0.99 (H) 0.00 - 0.70 10*3/mm3    Basophils Absolute 0.09 0.00 - 0.20 10*3/mm3    Immature Granulocyte Rel % 0.0 0.0 - 0.5 %    Immature Grans Absolute 0.00 0.00 - 0.03 10*3/mm3       Assessment/Plan   Diagnoses and all orders for this visit:    Moderate persistent asthma without complication  -     montelukast (SINGULAIR) 10 MG tablet; Take 1 tablet by mouth Every Night.  -     budesonide (PULMICORT FLEXHALER) 90 MCG/ACT inhaler; Inhale 2 puffs 2 (Two) Times a Day.      Discussion/Summary  Jermaine is here for acute care for his asthma.  I have filled out his [facility] paperwork but I have also instructed him that we need to add meds for better control.  I am starting him on singulair and pulmicort.  This should reduce his need for his proair significantly.    No Follow-up on file.

## 2017-07-26 ENCOUNTER — TELEPHONE (OUTPATIENT)
Dept: INTERNAL MEDICINE | Facility: CLINIC | Age: 47
End: 2017-07-26

## 2017-07-26 NOTE — TELEPHONE ENCOUNTER
Pt called, left voicemail and stated that he is having severe suboxone withdrawals, is miserable with aches, pains, chills. Wanted to know if something could be called in, because he cannot miss anymore work to come in for an OV.     Jayson informed, advised to call back with the following information.     Called pt back - 7/26/2017 2:39pm  No answer, left voicemail.     Informed on home phone, via voicemail, that we cannot treat without an OV. Informed we could schedule an appointment, we could not treat over the phone. Pt can either see doctor that was prescribing the suboxone or go to ER if pt is experiencing true withdrawal symptoms. Tried to get more info on whether or not pt was actually withdrawalling or had been weaned off suboxone.     Pt had called earlier in the day and stated he was having withdrawal like symptoms.     Wife called later and stated that the pt had been weaned off, but having unwanted side effects of anxiety during the day and unable to sleep at night.     Jayson stated that this could not be a work-in appointment, needed to be an actual OV. Pt informed earliest OV time available was Tuesday at 2:30pm - per Jayson.

## 2017-07-27 ENCOUNTER — TELEPHONE (OUTPATIENT)
Dept: INTERNAL MEDICINE | Facility: CLINIC | Age: 47
End: 2017-07-27

## 2017-07-27 ENCOUNTER — OFFICE VISIT (OUTPATIENT)
Dept: INTERNAL MEDICINE | Facility: CLINIC | Age: 47
End: 2017-07-27

## 2017-07-27 VITALS
DIASTOLIC BLOOD PRESSURE: 100 MMHG | HEIGHT: 76 IN | RESPIRATION RATE: 18 BRPM | BODY MASS INDEX: 22.41 KG/M2 | HEART RATE: 86 BPM | OXYGEN SATURATION: 99 % | SYSTOLIC BLOOD PRESSURE: 160 MMHG | WEIGHT: 184 LBS

## 2017-07-27 DIAGNOSIS — F51.01 PRIMARY INSOMNIA: ICD-10-CM

## 2017-07-27 DIAGNOSIS — F11.93 WITHDRAWAL FROM OPIOIDS (HCC): Primary | ICD-10-CM

## 2017-07-27 DIAGNOSIS — R19.7 DIARRHEA, UNSPECIFIED TYPE: ICD-10-CM

## 2017-07-27 DIAGNOSIS — F41.9 ANXIETY: ICD-10-CM

## 2017-07-27 PROCEDURE — 99215 OFFICE O/P EST HI 40 MIN: CPT | Performed by: INTERNAL MEDICINE

## 2017-07-27 NOTE — PROGRESS NOTES
Chief Complaint  Jermaine Pichardo is a 47 y.o. male who presents for Anxiety; Insomnia; Med Management (Sunday was the last dose, made one month supply last 2 months. Weaned off of it. Had been on the suboxone for about a year. ); Irritable; Diarrhea; and Chills  .    History of Present Illness   Jermaine is here for acute care.  We worked him in between patients today due to the nature of his issues.  He decided to come off of suboxone.  He didn't think he needed it any longer.  He got his last script in May and decided on his own to taper it down over a two month period of time. His prescription was to use two 8 mg strips a day.  He tapered the dose down over the last two months to 4 mg a day. His last dose was Sunday.  He has progressively felt worse and worse each day.   He still drinks 4-5 drinks a day.  He smells of alcohol today in the office but denies having consumed anything today.    He complains of being very irritable.  He has diarrhea, anxiety, body aches, chills, insomnia.  He feels like he is going to die.  The cost of suboxone was $350 a month and it is too expensive at the clinic he was going to.    He has not contacted the suboxone clinic to let them know what he is experiencing.   His sister gave him a 7.5/325 hydrocodone and an ambien last night which he took and allowed him to rest some.   He is here today wanting help.  The clinic is [facility].  Dr. Pinedo, Dr. John Burroughs, Dr. Bender.  He was seeing Dr. Burroughs.  I have called the clinic and spoken with the  there.  I asked to speak with Dr. Burroughs but was told he was too busy seeing patients to talk.  The  was kind enough to call me back after talking to dr. Burroughs to let me know this.  We discussed the symptoms Jermaine was having and she relayed that Dr. Burrougsh recommended sending Jermaine to [facility] or to [facility].  I asked the  if there were any clinics that accept insurance and she gave me the name of  one called [facility].  I discussed all of this with Jermaine and he said he was not going to do any of these options.   He was worried about missing more work.  I asked him how he was going to work while withdrawing.  I had filled [facility] paperwork out earlier in the week for asthma attacks he was claiming to be having.   He said he had already used up his allotted time for this month.      His BP is high today in the office.  He has taken his lisinopril today.        Review of Systems   Constitution: Positive for chills and diaphoresis.   HENT: Positive for congestion (mild).    Eyes: Negative for blurred vision and visual disturbance.   Cardiovascular: Positive for palpitations. Negative for chest pain.   Respiratory: Negative for shortness of breath and sleep disturbances due to breathing.    Skin: Positive for flushing. Negative for itching.   Musculoskeletal: Positive for joint pain and myalgias.   Gastrointestinal: Positive for diarrhea and nausea.   Genitourinary: Negative for bladder incontinence.   Neurological: Positive for tremors. Negative for focal weakness and seizures.   Psychiatric/Behavioral: Positive for depression and substance abuse. Negative for suicidal ideas and thoughts of violence. The patient is nervous/anxious.        Patient Active Problem List   Diagnosis   • Actinic keratosis   • Anxiety   • Asthma   • Essential hypertension   • Primary insomnia   • Macrocytic anemia   • Low vitamin B12 level   • Withdrawal from opioids       Past Medical History:   Diagnosis Date   • Actinic keratosis    • Anxiety    • Asthma        Past Surgical History:   Procedure Laterality Date   • MANDIBLE SURGERY         Family History   Problem Relation Age of Onset   • Diabetes Mother    • Colon cancer Paternal Grandfather        Social History     Social History   • Marital status:      Spouse name: N/A   • Number of children: 3   • Years of education: N/A     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 1 inhaler 11   • lisinopril (PRINIVIL,ZESTRIL) 5 MG tablet Take 1 tablet by mouth daily. 90 tablet 3   • montelukast (SINGULAIR) 10 MG tablet Take 1 tablet by mouth Every Night. 30 tablet 6   • PROAIR  (90 BASE) MCG/ACT inhaler INHALE 1 PUFF BY MOUTH EVERY 4 HOURS AS NEEDED 8.5 inhaler 3   • sertraline (ZOLOFT) 100 MG tablet TAKE 1 TABLET EVERY DAY 90 tablet 3   • SUBOXONE 8-2 MG film film DISSOLVE 1 AND 1/2 FILM UNDER THE TONGUE EVERY DAY  0   • vitamin B-12 (CYANOCOBALAMIN) 1000 MCG tablet Take 1 tablet by mouth Daily. 30 tablet 6     No current facility-administered medications on file prior to visit.        No Known Allergies    Vitals:    07/27/17 1326   BP: 160/100   Pulse: 86   Resp: 18   SpO2: 99%   Weight: 184 lb (83.5 kg)   Height: 76\" (193 cm)       Body mass index is 22.4 kg/(m^2).    Objective   Physical Exam   Constitutional: He is oriented to person, place, and time. He appears well-developed and well-nourished. He appears distressed.   HENT:   Head: Normocephalic and atraumatic.   Eyes: Conjunctivae are normal. Pupils are equal, round, and reactive to light.   Neck: Normal range of motion. Neck supple.   Cardiovascular: Normal rate and regular rhythm.    No murmur heard.  Pulmonary/Chest: Effort normal and breath sounds normal. No respiratory distress.   Lymphadenopathy:     He has no cervical adenopathy.   Neurological: He is alert and oriented to person, place, and time.   Skin: " Skin is warm. He is not diaphoretic. There is erythema.   Psychiatric: His mood appears anxious. He is agitated. He is not aggressive.       Results for orders placed or performed in visit on 03/28/17   Vitamin B12   Result Value Ref Range    Vitamin B-12 437 211 - 946 pg/mL   CBC & Differential   Result Value Ref Range    WBC 10.40 4.50 - 10.70 10*3/mm3    RBC 4.37 (L) 4.60 - 6.00 10*6/mm3    Hemoglobin 13.9 13.7 - 17.6 g/dL    Hematocrit 41.2 40.4 - 52.2 %    MCV 94.3 79.8 - 96.2 fL    MCH 31.8 27.0 - 32.7 pg    MCHC 33.7 32.6 - 36.4 g/dL    RDW 12.7 11.5 - 14.5 %    Platelets 304 140 - 500 10*3/mm3    Neutrophil Rel % 64.1 42.7 - 76.0 %    Lymphocyte Rel % 19.1 (L) 19.6 - 45.3 %    Monocyte Rel % 6.4 5.0 - 12.0 %    Eosinophil Rel % 9.5 (H) 0.3 - 6.2 %    Basophil Rel % 0.9 0.0 - 1.5 %    Neutrophils Absolute 6.66 1.90 - 8.10 10*3/mm3    Lymphocytes Absolute 1.99 0.90 - 4.80 10*3/mm3    Monocytes Absolute 0.67 0.20 - 1.20 10*3/mm3    Eosinophils Absolute 0.99 (H) 0.00 - 0.70 10*3/mm3    Basophils Absolute 0.09 0.00 - 0.20 10*3/mm3    Immature Granulocyte Rel % 0.0 0.0 - 0.5 %    Immature Grans Absolute 0.00 0.00 - 0.03 10*3/mm3       Assessment/Plan   Diagnoses and all orders for this visit:    Withdrawal from opioids    Anxiety    Diarrhea, unspecified type    Primary insomnia      Discussion/Summary  Jermaine is here for acute care for a new issue.  I spent over an hour trying to get him help.  I spent 45 min in the room with him listening to him and counseling him on his options.  I have explained that opioid withdrawal and detox is not my specialty.  I spent another h15  min contacting Dr. Burroughs's office and speaking twice to the  who was quite helpful.  I detailed the three options of [facility], [facility], or attempting to call [facility] for an appt or last case going back to Dr. Burroughs's office.  Financially this was not an option for him.  Dr. Burroughs had recommended via his  that I  start him on neurontin 600 mg q6h but I explained that I am not comfortable with starting this high of a dose of neurontin for a disease state I am not accustomed to treat.  This fell far outside of my comfort zone especially since Dr. Burroughs himself was not willing to get on the phone and speak directly to me.  Jermaine left the office saying he was not going to do any of these options that I gave him and that he would go home and tough it out.  I expressed my concern in several ways that this was not ideal and his best option would be [facility].  I expressed concern that he would relapse into taking narcotics since he had already taken one of his sister's the night before.  I calculated the Clinical opioid Withdrawal scale (COWS) from up to date.  He scored a 21 which puts him in a moderate withdrawal range according to this.      Apparently, he called the office later this afternoon stating that I do not care about him and that I did not want to help him.  Clearly his withdrawal is clouding his reason as I have spent an hour of the afternoon trying to get him help.  Unfortunately, his wife is not on his Hippa form or I would call her and give her the detailed list of options I gave him today.  Hopefully, he will pull through this without relapsing into drug addiction.  I am disappointed in Dr. Burroughs's clinic and his lack of willingness to speak on the phone.          No Follow-up on file.

## 2017-07-28 ENCOUNTER — TELEPHONE (OUTPATIENT)
Dept: INTERNAL MEDICINE | Facility: CLINIC | Age: 47
End: 2017-07-28

## 2017-07-28 NOTE — TELEPHONE ENCOUNTER
Wife called and stated that the pt is having withdraws. He is at home again today, crying, very irritable. Stated that  was not willing to help him.     Informed the wife that this area is not of 's expertise, it is a hard situation, because there is very limited options of what to prescribe to help, he cannot have controlled substances or anything habit forming/addictive. Jayson spent a great deal of time trying to offer the patient with options. Wife is not on HIPAA so I cannot disclose information about the pt except that the manager stated that [facility] is a good place to go to detox/get through withdrawal symptoms and might be more affordable.     Wife stated that a suboxone clinic only wants to prescribe more suboxone and they would rather him not be on it, plus it is very expensive.     Wife states understanding. Thanked me for my efforts.

## 2017-07-31 ENCOUNTER — TELEPHONE (OUTPATIENT)
Dept: INTERNAL MEDICINE | Facility: CLINIC | Age: 47
End: 2017-07-31

## 2017-07-31 RX ORDER — TRAZODONE HYDROCHLORIDE 50 MG/1
TABLET ORAL
Qty: 30 TABLET | Refills: 6 | Status: SHIPPED | OUTPATIENT
Start: 2017-07-31 | End: 2017-08-10 | Stop reason: SDUPTHER

## 2017-07-31 NOTE — TELEPHONE ENCOUNTER
Wife called and stated that pt is doing much better at the moment, but stated that pt is having trouble sleeping. Had been on trazodone in the past.     [facility] recommended something to help him sleep that would be prescribed by his primary care doctor.     Last prescribed by  on 12/26/2015.      Informed that Jayson sent in the prescription requested.

## 2017-08-01 DIAGNOSIS — I10 ESSENTIAL HYPERTENSION: ICD-10-CM

## 2017-08-02 RX ORDER — LISINOPRIL 5 MG/1
TABLET ORAL
Qty: 90 TABLET | Refills: 3 | Status: SHIPPED | OUTPATIENT
Start: 2017-08-02 | End: 2018-10-04 | Stop reason: SDUPTHER

## 2017-08-10 ENCOUNTER — TELEPHONE (OUTPATIENT)
Dept: INTERNAL MEDICINE | Facility: CLINIC | Age: 47
End: 2017-08-10

## 2017-08-10 RX ORDER — TRAZODONE HYDROCHLORIDE 50 MG/1
100 TABLET ORAL NIGHTLY
Qty: 60 TABLET | Refills: 6 | Status: SHIPPED | OUTPATIENT
Start: 2017-08-10 | End: 2017-09-05 | Stop reason: SDUPTHER

## 2017-08-10 NOTE — TELEPHONE ENCOUNTER
Pt's wife called and stated that we had approved the trazodone script to help the pt sleep, but he is having trouble sleeping again and wants to know if there is something else the pt can try.

## 2017-08-10 NOTE — TELEPHONE ENCOUNTER
Pt contacted the pharmacy to see if we had sent in a refill for the trazodone and changed it to the 100mg.     Jayson updated the script.

## 2017-09-05 RX ORDER — TRAZODONE HYDROCHLORIDE 50 MG/1
100 TABLET ORAL NIGHTLY
Qty: 90 TABLET | Refills: 3 | Status: SHIPPED | OUTPATIENT
Start: 2017-09-05 | End: 2019-01-08

## 2017-12-28 RX ORDER — SERTRALINE HYDROCHLORIDE 100 MG/1
TABLET, FILM COATED ORAL
Qty: 90 TABLET | Refills: 3 | Status: SHIPPED | OUTPATIENT
Start: 2017-12-28 | End: 2019-01-08

## 2018-02-15 DIAGNOSIS — E53.8 LOW VITAMIN B12 LEVEL: ICD-10-CM

## 2018-02-15 DIAGNOSIS — I10 ESSENTIAL HYPERTENSION: ICD-10-CM

## 2018-02-15 DIAGNOSIS — Z00.00 HEALTH CARE MAINTENANCE: Primary | ICD-10-CM

## 2018-09-14 ENCOUNTER — OFFICE VISIT (OUTPATIENT)
Dept: INTERNAL MEDICINE | Facility: CLINIC | Age: 48
End: 2018-09-14

## 2018-09-14 VITALS
BODY MASS INDEX: 21.31 KG/M2 | OXYGEN SATURATION: 99 % | TEMPERATURE: 99.2 F | HEART RATE: 100 BPM | DIASTOLIC BLOOD PRESSURE: 88 MMHG | SYSTOLIC BLOOD PRESSURE: 140 MMHG | HEIGHT: 76 IN | WEIGHT: 175 LBS

## 2018-09-14 DIAGNOSIS — H60.311 ACUTE DIFFUSE OTITIS EXTERNA OF RIGHT EAR: Primary | ICD-10-CM

## 2018-09-14 PROBLEM — M51.36 DDD (DEGENERATIVE DISC DISEASE), LUMBAR: Status: ACTIVE | Noted: 2018-09-14

## 2018-09-14 PROBLEM — M51.369 DDD (DEGENERATIVE DISC DISEASE), LUMBAR: Status: ACTIVE | Noted: 2018-09-14

## 2018-09-14 PROCEDURE — 99214 OFFICE O/P EST MOD 30 MIN: CPT | Performed by: INTERNAL MEDICINE

## 2018-09-14 RX ORDER — CIPROFLOXACIN AND DEXAMETHASONE 3; 1 MG/ML; MG/ML
4 SUSPENSION/ DROPS AURICULAR (OTIC) 2 TIMES DAILY
Qty: 7.5 ML | Refills: 0 | Status: SHIPPED | OUTPATIENT
Start: 2018-09-14 | End: 2018-09-21

## 2018-09-14 NOTE — PROGRESS NOTES
"Earache (Right ear )      HPI  Jermaine Pichardo is a 48 y.o. male RTC in acute care:   \"I have a severe ear infection\".  Sx started 3-4 weeks ago with \"itching at first\".  Went to [facility] with just itching and some mild hearing loss on R side. Told had wax impaction and cleaned out with flushing.  Felt some better after that, but \"not 100%\".  Could hear better.   In last 3-4 days has noted some discharge from ear, yellowish.  Pain has been progressive in last 3-4 days and has some throbbing in ear.  Cannot hear out of ear as well, but has not lost all hearing. \"It is a muffled sound\".  No F/C.  No cough/ sore throat. Has some mild sinus congestion that thinks is unrelated.   Meds: Debrox, 3 weeks ago. Nothing since.    Did not stick anything in ear.     Review of Systems   Constitution: Negative for chills and fever.   HENT: Positive for congestion (mild only, not related to ear), ear discharge (on R), ear pain (on R) and hearing loss (on R, not complete). Negative for hoarse voice and sore throat.    Cardiovascular: Negative for dyspnea on exertion.   Respiratory: Negative for cough and shortness of breath.    Skin: Negative for rash.       The following portions of the patient's history were reviewed and updated as appropriate: allergies, current medications, past medical history and problem list.      Current Outpatient Prescriptions:   •  budesonide (PULMICORT FLEXHALER) 90 MCG/ACT inhaler, Inhale 2 puffs 2 (Two) Times a Day., Disp: 1 inhaler, Rfl: 11  •  lisinopril (PRINIVIL,ZESTRIL) 5 MG tablet, TAKE 1 TABLET BY MOUTH DAILY., Disp: 90 tablet, Rfl: 3  •  montelukast (SINGULAIR) 10 MG tablet, Take 1 tablet by mouth Every Night., Disp: 30 tablet, Rfl: 6  •  PROAIR  (90 Base) MCG/ACT inhaler, INHALE 1 PUFF BY MOUTH EVERY 4 HOURS AS NEEDED, Disp: 8.5 inhaler, Rfl: 3  •  sertraline (ZOLOFT) 100 MG tablet, TAKE 1 TABLET EVERY DAY, Disp: 90 tablet, Rfl: 3  •  SUBOXONE 8-2 MG film film, DISSOLVE 1 AND 1/2 FILM UNDER THE " "TONGUE EVERY DAY, Disp: , Rfl: 0  •  traZODone (DESYREL) 50 MG tablet, Take 2 tablets by mouth Every Night., Disp: 90 tablet, Rfl: 3  •  vitamin B-12 (CYANOCOBALAMIN) 1000 MCG tablet, Take 1 tablet by mouth Daily., Disp: 30 tablet, Rfl: 6  •  ciprofloxacin-dexamethasone (CIPRODEX) 0.3-0.1 % otic suspension, Administer 4 drops to the right ear 2 (Two) Times a Day for 7 days., Disp: 7.5 mL, Rfl: 0    Vitals:    09/14/18 1441   BP: 140/88   Pulse: 100   Temp: 99.2 °F (37.3 °C)   SpO2: 99%   Weight: 79.4 kg (175 lb)   Height: 193 cm (75.98\")         Physical Exam   Constitutional: He is oriented to person, place, and time. He appears well-developed and well-nourished. He does not have a sickly appearance. He does not appear ill. No distress.   HENT:   Head: Normocephalic and atraumatic.   Right Ear: Tympanic membrane and external ear normal. There is swelling (with mild erythema in canal and moist, whitish debris along canal.). No foreign bodies. Decreased hearing (by finger rub) is noted.   Left Ear: Tympanic membrane, external ear and ear canal normal.   Nose: Mucosal edema and rhinorrhea present. Right sinus exhibits no maxillary sinus tenderness and no frontal sinus tenderness. Left sinus exhibits no maxillary sinus tenderness and no frontal sinus tenderness.   Mouth/Throat: Uvula is midline. Mucous membranes are not pale, not dry and not cyanotic. Posterior oropharyngeal edema and posterior oropharyngeal erythema present. No oropharyngeal exudate.   Eyes: Pupils are equal, round, and reactive to light. Conjunctivae are normal. Right eye exhibits no discharge. Left eye exhibits no discharge.   Neck: Normal range of motion. Neck supple.   Cardiovascular: Normal rate and regular rhythm.    Pulmonary/Chest: Effort normal and breath sounds normal. No respiratory distress. He has no wheezes. He has no rales.   Lymphadenopathy:     He has no cervical adenopathy.   Neurological: He is alert and oriented to person, place, " and time. No cranial nerve deficit.   Psychiatric: He has a normal mood and affect. His behavior is normal.   Vitals reviewed.      Assessment/ Plan  Diagnoses and all orders for this visit:    Acute diffuse otitis externa of right ear  -     ciprofloxacin-dexamethasone (CIPRODEX) 0.3-0.1 % otic suspension; Administer 4 drops to the right ear 2 (Two) Times a Day for 7 days.        Return for Next scheduled follow up.      Discussion:  Jermaine Pichardo is a 48 y.o. male RTC in acute care (new issue and pt to examiner) with recent cerumen impaction cleared at [facility] by lavage. However, ear never felt fully cleared and in last 3-4 days has had ear discharge of yellowish material and some onset of throbbing ear pain.  Exam reveals a swollen, erythematous ear canal on with moist debris throughout canal. Pt has quite a bit of ear wax remaining in segments throughout deep and distal ear canal.  Able to clear wax by pick in distal canal revealing inflamed canal as noted, some deeper non-impacted wax remains.  Pt has obvious otitis externa on exam and will have pt start CiproDex drops BID for 7 days.  I advised some higher dose Ibuprofen (600mg) q 8 hours PRN for pain while CiproDex begins to work.  Declined stronger Rx opiods as feel unneccessary and pt has hx of opioid W/D noted in chart. Pt to call Monday if failure to improve, fever, progressive drainage, or any new sx.

## 2018-10-04 DIAGNOSIS — I10 ESSENTIAL HYPERTENSION: ICD-10-CM

## 2018-10-04 RX ORDER — LISINOPRIL 5 MG/1
TABLET ORAL
Qty: 90 TABLET | Refills: 1 | Status: SHIPPED | OUTPATIENT
Start: 2018-10-04 | End: 2019-04-23 | Stop reason: SDUPTHER

## 2018-11-19 RX ORDER — ALBUTEROL SULFATE 90 UG/1
AEROSOL, METERED RESPIRATORY (INHALATION)
Qty: 8.5 INHALER | Refills: 3 | Status: SHIPPED | OUTPATIENT
Start: 2018-11-19 | End: 2019-10-10 | Stop reason: SDUPTHER

## 2019-01-08 ENCOUNTER — OFFICE VISIT (OUTPATIENT)
Dept: INTERNAL MEDICINE | Facility: CLINIC | Age: 49
End: 2019-01-08

## 2019-01-08 VITALS
OXYGEN SATURATION: 98 % | BODY MASS INDEX: 22.04 KG/M2 | WEIGHT: 181 LBS | HEIGHT: 76 IN | DIASTOLIC BLOOD PRESSURE: 70 MMHG | RESPIRATION RATE: 18 BRPM | SYSTOLIC BLOOD PRESSURE: 122 MMHG | HEART RATE: 72 BPM

## 2019-01-08 DIAGNOSIS — I10 ESSENTIAL HYPERTENSION: Primary | ICD-10-CM

## 2019-01-08 DIAGNOSIS — M51.36 DDD (DEGENERATIVE DISC DISEASE), LUMBAR: ICD-10-CM

## 2019-01-08 DIAGNOSIS — Z23 NEED FOR TDAP VACCINATION: ICD-10-CM

## 2019-01-08 DIAGNOSIS — J45.20 MILD INTERMITTENT ASTHMA WITHOUT COMPLICATION: ICD-10-CM

## 2019-01-08 DIAGNOSIS — F33.0 MILD EPISODE OF RECURRENT MAJOR DEPRESSIVE DISORDER (HCC): ICD-10-CM

## 2019-01-08 LAB
ALBUMIN SERPL-MCNC: 4.9 G/DL (ref 3.5–5.2)
ALBUMIN/GLOB SERPL: 1.9 G/DL
ALP SERPL-CCNC: 78 U/L (ref 39–117)
ALT SERPL-CCNC: 24 U/L (ref 1–41)
AST SERPL-CCNC: 23 U/L (ref 1–40)
BASOPHILS # BLD AUTO: 0.08 10*3/MM3 (ref 0–0.2)
BASOPHILS NFR BLD AUTO: 0.7 % (ref 0–1.5)
BILIRUB SERPL-MCNC: 0.3 MG/DL (ref 0.1–1.2)
BUN SERPL-MCNC: 17 MG/DL (ref 6–20)
BUN/CREAT SERPL: 21.3 (ref 7–25)
CALCIUM SERPL-MCNC: 9.9 MG/DL (ref 8.6–10.5)
CHLORIDE SERPL-SCNC: 102 MMOL/L (ref 98–107)
CHOLEST SERPL-MCNC: 229 MG/DL (ref 0–200)
CO2 SERPL-SCNC: 28 MMOL/L (ref 22–29)
CREAT SERPL-MCNC: 0.8 MG/DL (ref 0.76–1.27)
EOSINOPHIL # BLD AUTO: 0.71 10*3/MM3 (ref 0–0.7)
EOSINOPHIL NFR BLD AUTO: 5.9 % (ref 0.3–6.2)
ERYTHROCYTE [DISTWIDTH] IN BLOOD BY AUTOMATED COUNT: 12 % (ref 11.5–14.5)
GLOBULIN SER CALC-MCNC: 2.6 GM/DL
GLUCOSE SERPL-MCNC: 84 MG/DL (ref 65–99)
HCT VFR BLD AUTO: 41.4 % (ref 40.4–52.2)
HDLC SERPL-MCNC: 75 MG/DL (ref 40–60)
HGB BLD-MCNC: 13.4 G/DL (ref 13.7–17.6)
IMM GRANULOCYTES # BLD AUTO: 0.02 10*3/MM3 (ref 0–0.03)
IMM GRANULOCYTES NFR BLD AUTO: 0.2 % (ref 0–0.5)
LDLC SERPL CALC-MCNC: 140 MG/DL (ref 0–100)
LYMPHOCYTES # BLD AUTO: 2.21 10*3/MM3 (ref 0.9–4.8)
LYMPHOCYTES NFR BLD AUTO: 18.3 % (ref 19.6–45.3)
MCH RBC QN AUTO: 31.6 PG (ref 27–32.7)
MCHC RBC AUTO-ENTMCNC: 32.4 G/DL (ref 32.6–36.4)
MCV RBC AUTO: 97.6 FL (ref 79.8–96.2)
MONOCYTES # BLD AUTO: 0.77 10*3/MM3 (ref 0.2–1.2)
MONOCYTES NFR BLD AUTO: 6.4 % (ref 5–12)
NEUTROPHILS # BLD AUTO: 8.28 10*3/MM3 (ref 1.9–8.1)
NEUTROPHILS NFR BLD AUTO: 68.7 % (ref 42.7–76)
PLATELET # BLD AUTO: 346 10*3/MM3 (ref 140–500)
POTASSIUM SERPL-SCNC: 4.3 MMOL/L (ref 3.5–5.2)
PROT SERPL-MCNC: 7.5 G/DL (ref 6–8.5)
RBC # BLD AUTO: 4.24 10*6/MM3 (ref 4.6–6)
SODIUM SERPL-SCNC: 142 MMOL/L (ref 136–145)
TRIGL SERPL-MCNC: 69 MG/DL (ref 0–150)
VLDLC SERPL CALC-MCNC: 13.8 MG/DL (ref 5–40)
WBC # BLD AUTO: 12.05 10*3/MM3 (ref 4.5–10.7)

## 2019-01-08 PROCEDURE — 90715 TDAP VACCINE 7 YRS/> IM: CPT | Performed by: INTERNAL MEDICINE

## 2019-01-08 PROCEDURE — 90674 CCIIV4 VAC NO PRSV 0.5 ML IM: CPT | Performed by: INTERNAL MEDICINE

## 2019-01-08 PROCEDURE — 90472 IMMUNIZATION ADMIN EACH ADD: CPT | Performed by: INTERNAL MEDICINE

## 2019-01-08 PROCEDURE — 99214 OFFICE O/P EST MOD 30 MIN: CPT | Performed by: INTERNAL MEDICINE

## 2019-01-08 PROCEDURE — 90471 IMMUNIZATION ADMIN: CPT | Performed by: INTERNAL MEDICINE

## 2019-01-08 RX ORDER — SERTRALINE HYDROCHLORIDE 100 MG/1
150 TABLET, FILM COATED ORAL DAILY
Qty: 135 TABLET | Refills: 1 | Status: SHIPPED | OUTPATIENT
Start: 2019-01-08 | End: 2019-02-06 | Stop reason: SDUPTHER

## 2019-01-08 NOTE — PROGRESS NOTES
Chief Complaint  Jermaine Pichardo is a 48 y.o. male who presents for Hypertension; Follow-up (Pt has not been seen in over a year. ); and Other (FMLA paperwork )  .    History of Present Illness   Jermaine is here for follow up on his asthma, HTN, and LBP.  He has not been seen in over a year.  No new health concerns.  No cp or palp or soa.  He hasn't had a flu shot.   He is not taking his pulmicort or singulair.  He uses his albuterol as needed.   He need FMLA filled out for his back and his asthma.  He may have one flare a month of each of these.  He feels like he would benefit from an increase in his sertraline.        Review of Systems   Constitution: Negative for chills and fever.   Cardiovascular: Negative for chest pain, dyspnea on exertion, leg swelling and palpitations.   Respiratory: Negative for cough and shortness of breath.    Musculoskeletal: Positive for back pain.   Neurological: Negative for dizziness and light-headedness.       Patient Active Problem List   Diagnosis   • Actinic keratosis   • Anxiety   • Asthma   • Essential hypertension   • Primary insomnia   • Macrocytic anemia   • Low vitamin B12 level   • Withdrawal from opioids (CMS/HCC)   • DDD (degenerative disc disease), lumbar   • Mild episode of recurrent major depressive disorder (CMS/HCC)       Past Medical History:   Diagnosis Date   • Actinic keratosis    • Anxiety    • Asthma        Past Surgical History:   Procedure Laterality Date   • MANDIBLE SURGERY         Family History   Problem Relation Age of Onset   • Diabetes Mother    • Colon cancer Paternal Grandfather        Social History     Socioeconomic History   • Marital status:      Spouse name: Not on file   • Number of children: 3   • Years of education: Not on file   • Highest education level: Not on file   Social Needs   • Financial resource strain: Not on file   • Food insecurity - worry: Not on file   • Food insecurity - inability: Not on file   • Transportation needs -  "medical: Not on file   • Transportation needs - non-medical: Not on file   Occupational History   • 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 [employer]   Tobacco Use   • Smoking status: Current Every Day Smoker     Types: Cigarettes   • Smokeless tobacco: Never Used   • Tobacco comment: started at age 41   Substance and Sexual Activity   • Alcohol use: Yes     Frequency: 4 or more times a week     Drinks per session: 1 or 2     Comment: 4-5 per day   • Drug use: No   • Sexual activity: Not on file   Other Topics Concern   • Not on file   Social History Narrative   • Not on file       Current Outpatient Medications on File Prior to Visit   Medication Sig Dispense Refill   • albuterol (PROAIR HFA) 108 (90 Base) MCG/ACT inhaler INHALE 1 PUFF BY MOUTH EVERY 4 HOURS AS NEEDED 8.5 inhaler 3   • lisinopril (PRINIVIL,ZESTRIL) 5 MG tablet TAKE 1 TABLET BY MOUTH DAILY. 90 tablet 1   • [DISCONTINUED] budesonide (PULMICORT FLEXHALER) 90 MCG/ACT inhaler Inhale 2 puffs 2 (Two) Times a Day. 1 inhaler 11   • [DISCONTINUED] montelukast (SINGULAIR) 10 MG tablet Take 1 tablet by mouth Every Night. 30 tablet 6   • [DISCONTINUED] sertraline (ZOLOFT) 100 MG tablet TAKE 1 TABLET EVERY DAY 90 tablet 3   • [DISCONTINUED] SUBOXONE 8-2 MG film film DISSOLVE 1 AND 1/2 FILM UNDER THE TONGUE EVERY DAY  0   • [DISCONTINUED] traZODone (DESYREL) 50 MG tablet Take 2 tablets by mouth Every Night. 90 tablet 3   • [DISCONTINUED] vitamin B-12 (CYANOCOBALAMIN) 1000 MCG tablet Take 1 tablet by mouth Daily. 30 tablet 6     No current facility-administered medications on file prior to visit.        No Known Allergies    Vitals:    01/08/19 1348   BP: 122/70   Pulse: 72   Resp: 18   SpO2: 98%   Weight: 82.1 kg (181 lb)   Height: 193 cm (75.98\")       Body mass index is 22.04 kg/m².    Objective   Physical Exam   Constitutional: He is oriented to person, place, and time. He appears well-developed and well-nourished. No distress.   HENT:   Head: Normocephalic and atraumatic. "   Eyes: Conjunctivae are normal. No scleral icterus.   Neck: Normal range of motion. Neck supple.   Cardiovascular: Normal rate, regular rhythm and normal heart sounds. Exam reveals no gallop and no friction rub.   No murmur heard.  Pulmonary/Chest: Effort normal and breath sounds normal. No respiratory distress. He has no wheezes. He has no rales.   Musculoskeletal: Normal range of motion. He exhibits no edema.   Lymphadenopathy:     He has no cervical adenopathy.   Neurological: He is alert and oriented to person, place, and time. No cranial nerve deficit.   Psychiatric: He has a normal mood and affect. His behavior is normal. Judgment and thought content normal.       Results for orders placed or performed in visit on 03/28/17   Vitamin B12   Result Value Ref Range    Vitamin B-12 437 211 - 946 pg/mL   CBC & Differential   Result Value Ref Range    WBC 10.40 4.50 - 10.70 10*3/mm3    RBC 4.37 (L) 4.60 - 6.00 10*6/mm3    Hemoglobin 13.9 13.7 - 17.6 g/dL    Hematocrit 41.2 40.4 - 52.2 %    MCV 94.3 79.8 - 96.2 fL    MCH 31.8 27.0 - 32.7 pg    MCHC 33.7 32.6 - 36.4 g/dL    RDW 12.7 11.5 - 14.5 %    Platelets 304 140 - 500 10*3/mm3    Neutrophil Rel % 64.1 42.7 - 76.0 %    Lymphocyte Rel % 19.1 (L) 19.6 - 45.3 %    Monocyte Rel % 6.4 5.0 - 12.0 %    Eosinophil Rel % 9.5 (H) 0.3 - 6.2 %    Basophil Rel % 0.9 0.0 - 1.5 %    Neutrophils Absolute 6.66 1.90 - 8.10 10*3/mm3    Lymphocytes Absolute 1.99 0.90 - 4.80 10*3/mm3    Monocytes Absolute 0.67 0.20 - 1.20 10*3/mm3    Eosinophils Absolute 0.99 (H) 0.00 - 0.70 10*3/mm3    Basophils Absolute 0.09 0.00 - 0.20 10*3/mm3    Immature Granulocyte Rel % 0.0 0.0 - 0.5 %    Immature Grans Absolute 0.00 0.00 - 0.03 10*3/mm3       Assessment/Plan   Diagnoses and all orders for this visit:    Essential hypertension  -     Comprehensive Metabolic Panel  -     CBC & Differential  -     Lipid Panel    Mild intermittent asthma without complication  -     Comprehensive Metabolic Panel  -      CBC & Differential  -     Lipid Panel  -     Flucelvax Quad=>4Years (PFS)    Need for Tdap vaccination  -     Tdap Vaccine Greater Than or Equal To 8yo IM    DDD (degenerative disc disease), lumbar    Mild episode of recurrent major depressive disorder (CMS/HCC)    Other orders  -     sertraline (ZOLOFT) 100 MG tablet; Take 1.5 tablets by mouth Daily.        Discussion/Summary  Jermaine is here for routine follow up.  We will get labs today.  Flu and Tdap today.  FMLA papers filled out.  Continue current meds.  We are going to increase his sertraline from 100 to 150.    No Follow-up on file.

## 2019-01-10 ENCOUNTER — TELEPHONE (OUTPATIENT)
Dept: INTERNAL MEDICINE | Facility: CLINIC | Age: 49
End: 2019-01-10

## 2019-01-10 NOTE — TELEPHONE ENCOUNTER
----- Message from Carisa Tyler MD sent at 1/10/2019  2:53 PM EST -----  Please call - his kidney and liver function are normal. Cholesterol is ok.  LDL is high and protective at 75.  LDL is a little higher than ideal at 140 but no need for meds.  His blood counts are elevated a lot.  Was he getting sick when he had these labs done?  I would like to repeat a CBC in 3 weeks.

## 2019-01-10 NOTE — TELEPHONE ENCOUNTER
Pt informed, states understanding.   Stated that he had been fighting off a head cold.   Aware to do a repeat lab around the last week of January, the first week of February.

## 2019-02-06 RX ORDER — SERTRALINE HYDROCHLORIDE 100 MG/1
TABLET, FILM COATED ORAL
Qty: 90 TABLET | Refills: 1 | Status: SHIPPED | OUTPATIENT
Start: 2019-02-06 | End: 2019-07-17 | Stop reason: SDUPTHER

## 2019-04-23 DIAGNOSIS — I10 ESSENTIAL HYPERTENSION: ICD-10-CM

## 2019-04-23 RX ORDER — LISINOPRIL 5 MG/1
TABLET ORAL
Qty: 90 TABLET | Refills: 1 | Status: SHIPPED | OUTPATIENT
Start: 2019-04-23 | End: 2019-11-04 | Stop reason: SDUPTHER

## 2019-06-06 ENCOUNTER — TRANSCRIBE ORDERS (OUTPATIENT)
Dept: ADMINISTRATIVE | Facility: HOSPITAL | Age: 49
End: 2019-06-06

## 2019-06-06 DIAGNOSIS — Z12.39 SCREENING BREAST EXAMINATION: Primary | ICD-10-CM

## 2019-07-08 DIAGNOSIS — Z00.00 LABORATORY TESTS ORDERED AS PART OF A COMPLETE PHYSICAL EXAM (CPE): Primary | ICD-10-CM

## 2019-07-08 LAB
ALBUMIN SERPL-MCNC: 4.7 G/DL (ref 3.5–5.2)
ALBUMIN/GLOB SERPL: 2.4 G/DL
ALP SERPL-CCNC: 89 U/L (ref 39–117)
ALT SERPL-CCNC: 16 U/L (ref 1–41)
APPEARANCE UR: CLEAR
AST SERPL-CCNC: 24 U/L (ref 1–40)
BACTERIA #/AREA URNS HPF: NORMAL /HPF
BASOPHILS # BLD AUTO: 0.11 10*3/MM3 (ref 0–0.2)
BASOPHILS NFR BLD AUTO: 1.6 % (ref 0–1.5)
BILIRUB SERPL-MCNC: 0.2 MG/DL (ref 0.2–1.2)
BILIRUB UR QL STRIP: NEGATIVE
BUN SERPL-MCNC: 12 MG/DL (ref 6–20)
BUN/CREAT SERPL: 15 (ref 7–25)
CALCIUM SERPL-MCNC: 8.9 MG/DL (ref 8.6–10.5)
CHLORIDE SERPL-SCNC: 99 MMOL/L (ref 98–107)
CHOLEST SERPL-MCNC: 230 MG/DL (ref 0–200)
CO2 SERPL-SCNC: 29.7 MMOL/L (ref 22–29)
COLOR UR: YELLOW
CREAT SERPL-MCNC: 0.8 MG/DL (ref 0.76–1.27)
EOSINOPHIL # BLD AUTO: 0.94 10*3/MM3 (ref 0–0.4)
EOSINOPHIL NFR BLD AUTO: 13.3 % (ref 0.3–6.2)
EPI CELLS #/AREA URNS HPF: NORMAL /HPF
ERYTHROCYTE [DISTWIDTH] IN BLOOD BY AUTOMATED COUNT: 12.2 % (ref 12.3–15.4)
GLOBULIN SER CALC-MCNC: 2 GM/DL
GLUCOSE SERPL-MCNC: 134 MG/DL (ref 65–99)
GLUCOSE UR QL: NEGATIVE
HCT VFR BLD AUTO: 41.2 % (ref 37.5–51)
HDLC SERPL-MCNC: 58 MG/DL (ref 40–60)
HGB BLD-MCNC: 13.4 G/DL (ref 13–17.7)
HGB UR QL STRIP: NEGATIVE
IMM GRANULOCYTES # BLD AUTO: 0.01 10*3/MM3 (ref 0–0.05)
IMM GRANULOCYTES NFR BLD AUTO: 0.1 % (ref 0–0.5)
KETONES UR QL STRIP: NEGATIVE
LDLC SERPL CALC-MCNC: 123 MG/DL (ref 0–100)
LEUKOCYTE ESTERASE UR QL STRIP: NEGATIVE
LYMPHOCYTES # BLD AUTO: 2.44 10*3/MM3 (ref 0.7–3.1)
LYMPHOCYTES NFR BLD AUTO: 34.4 % (ref 19.6–45.3)
MCH RBC QN AUTO: 31.5 PG (ref 26.6–33)
MCHC RBC AUTO-ENTMCNC: 32.5 G/DL (ref 31.5–35.7)
MCV RBC AUTO: 96.7 FL (ref 79–97)
MONOCYTES # BLD AUTO: 0.82 10*3/MM3 (ref 0.1–0.9)
MONOCYTES NFR BLD AUTO: 11.6 % (ref 5–12)
NEUTROPHILS # BLD AUTO: 2.77 10*3/MM3 (ref 1.7–7)
NEUTROPHILS NFR BLD AUTO: 39 % (ref 42.7–76)
NITRITE UR QL STRIP: NEGATIVE
NRBC BLD AUTO-RTO: 0 /100 WBC (ref 0–0.2)
PH UR STRIP: 5.5 [PH] (ref 5–8)
PLATELET # BLD AUTO: 261 10*3/MM3 (ref 140–450)
POTASSIUM SERPL-SCNC: 4 MMOL/L (ref 3.5–5.2)
PROT SERPL-MCNC: 6.7 G/DL (ref 6–8.5)
PROT UR QL STRIP: NEGATIVE
RBC # BLD AUTO: 4.26 10*6/MM3 (ref 4.14–5.8)
RBC #/AREA URNS HPF: NORMAL /HPF
SODIUM SERPL-SCNC: 141 MMOL/L (ref 136–145)
SP GR UR: 1.02 (ref 1–1.03)
TRIGL SERPL-MCNC: 246 MG/DL (ref 0–150)
TSH SERPL DL<=0.005 MIU/L-ACNC: 2.36 MIU/ML (ref 0.27–4.2)
UROBILINOGEN UR STRIP-MCNC: (no result) MG/DL
VLDLC SERPL CALC-MCNC: 49.2 MG/DL
WBC # BLD AUTO: 7.09 10*3/MM3 (ref 3.4–10.8)
WBC #/AREA URNS HPF: NORMAL /HPF

## 2019-07-09 ENCOUNTER — OFFICE VISIT (OUTPATIENT)
Dept: INTERNAL MEDICINE | Facility: CLINIC | Age: 49
End: 2019-07-09

## 2019-07-09 VITALS
DIASTOLIC BLOOD PRESSURE: 90 MMHG | SYSTOLIC BLOOD PRESSURE: 132 MMHG | BODY MASS INDEX: 21.68 KG/M2 | WEIGHT: 178 LBS | HEART RATE: 78 BPM | HEIGHT: 76 IN | OXYGEN SATURATION: 98 %

## 2019-07-09 DIAGNOSIS — Z00.00 WELL ADULT EXAM: Primary | ICD-10-CM

## 2019-07-09 PROBLEM — D53.9 MACROCYTIC ANEMIA: Status: RESOLVED | Noted: 2017-02-14 | Resolved: 2019-07-09

## 2019-07-09 PROBLEM — F41.8 DEPRESSION WITH ANXIETY: Status: ACTIVE | Noted: 2019-01-08

## 2019-07-09 LAB
Lab: NORMAL
VIT B12 SERPL-MCNC: 334 PG/ML (ref 211–946)
WRITTEN AUTHORIZATION: NORMAL

## 2019-07-09 PROCEDURE — 99396 PREV VISIT EST AGE 40-64: CPT | Performed by: INTERNAL MEDICINE

## 2019-07-09 RX ORDER — BUPRENORPHINE HYDROCHLORIDE AND NALOXONE HYDROCHLORIDE DIHYDRATE 8; 2 MG/1; MG/1
TABLET SUBLINGUAL
COMMUNITY
Start: 2019-07-08 | End: 2023-02-08

## 2019-07-09 NOTE — PATIENT INSTRUCTIONS
"DASH Eating Plan  DASH stands for \"Dietary Approaches to Stop Hypertension.\" The DASH eating plan is a healthy eating plan that has been shown to reduce high blood pressure (hypertension). It may also reduce your risk for type 2 diabetes, heart disease, and stroke. The DASH eating plan may also help with weight loss.  What are tips for following this plan?  General guidelines  · Avoid eating more than 2,300 mg (milligrams) of salt (sodium) a day. If you have hypertension, you may need to reduce your sodium intake to 1,500 mg a day.  · Limit alcohol intake to no more than 1 drink a day for nonpregnant women and 2 drinks a day for men. One drink equals 12 oz of beer, 5 oz of wine, or 1½ oz of hard liquor.  · Work with your health care provider to maintain a healthy body weight or to lose weight. Ask what an ideal weight is for you.  · Get at least 30 minutes of exercise that causes your heart to beat faster (aerobic exercise) most days of the week. Activities may include walking, swimming, or biking.  · Work with your health care provider or diet and nutrition specialist (dietitian) to adjust your eating plan to your individual calorie needs.  Reading food labels  · Check food labels for the amount of sodium per serving. Choose foods with less than 5 percent of the Daily Value of sodium. Generally, foods with less than 300 mg of sodium per serving fit into this eating plan.  · To find whole grains, look for the word \"whole\" as the first word in the ingredient list.  Shopping  · Buy products labeled as \"low-sodium\" or \"no salt added.\"  · Buy fresh foods. Avoid canned foods and premade or frozen meals.  Cooking  · Avoid adding salt when cooking. Use salt-free seasonings or herbs instead of table salt or sea salt. Check with your health care provider or pharmacist before using salt substitutes.  · Do not cummings foods. Cook foods using healthy methods such as baking, boiling, grilling, and broiling instead.  · Cook with " heart-healthy oils, such as olive, canola, soybean, or sunflower oil.  Meal planning    · Eat a balanced diet that includes:  ? 5 or more servings of fruits and vegetables each day. At each meal, try to fill half of your plate with fruits and vegetables.  ? Up to 6-8 servings of whole grains each day.  ? Less than 6 oz of lean meat, poultry, or fish each day. A 3-oz serving of meat is about the same size as a deck of cards. One egg equals 1 oz.  ? 2 servings of low-fat dairy each day.  ? A serving of nuts, seeds, or beans 5 times each week.  ? Heart-healthy fats. Healthy fats called Omega-3 fatty acids are found in foods such as flaxseeds and coldwater fish, like sardines, salmon, and mackerel.  · Limit how much you eat of the following:  ? Canned or prepackaged foods.  ? Food that is high in trans fat, such as fried foods.  ? Food that is high in saturated fat, such as fatty meat.  ? Sweets, desserts, sugary drinks, and other foods with added sugar.  ? Full-fat dairy products.  · Do not salt foods before eating.  · Try to eat at least 2 vegetarian meals each week.  · Eat more home-cooked food and less restaurant, buffet, and fast food.  · When eating at a restaurant, ask that your food be prepared with less salt or no salt, if possible.  What foods are recommended?  The items listed may not be a complete list. Talk with your dietitian about what dietary choices are best for you.  Grains  Whole-grain or whole-wheat bread. Whole-grain or whole-wheat pasta. Brown rice. Oatmeal. Quinoa. Bulgur. Whole-grain and low-sodium cereals. Janna bread. Low-fat, low-sodium crackers. Whole-wheat flour tortillas.  Vegetables  Fresh or frozen vegetables (raw, steamed, roasted, or grilled). Low-sodium or reduced-sodium tomato and vegetable juice. Low-sodium or reduced-sodium tomato sauce and tomato paste. Low-sodium or reduced-sodium canned vegetables.  Fruits  All fresh, dried, or frozen fruit. Canned fruit in natural juice (without  added sugar).  Meat and other protein foods  Skinless chicken or turkey. Ground chicken or turkey. Pork with fat trimmed off. Fish and seafood. Egg whites. Dried beans, peas, or lentils. Unsalted nuts, nut butters, and seeds. Unsalted canned beans. Lean cuts of beef with fat trimmed off. Low-sodium, lean deli meat.  Dairy  Low-fat (1%) or fat-free (skim) milk. Fat-free, low-fat, or reduced-fat cheeses. Nonfat, low-sodium ricotta or cottage cheese. Low-fat or nonfat yogurt. Low-fat, low-sodium cheese.  Fats and oils  Soft margarine without trans fats. Vegetable oil. Low-fat, reduced-fat, or light mayonnaise and salad dressings (reduced-sodium). Canola, safflower, olive, soybean, and sunflower oils. Avocado.  Seasoning and other foods  Herbs. Spices. Seasoning mixes without salt. Unsalted popcorn and pretzels. Fat-free sweets.  What foods are not recommended?  The items listed may not be a complete list. Talk with your dietitian about what dietary choices are best for you.  Grains  Baked goods made with fat, such as croissants, muffins, or some breads. Dry pasta or rice meal packs.  Vegetables  Creamed or fried vegetables. Vegetables in a cheese sauce. Regular canned vegetables (not low-sodium or reduced-sodium). Regular canned tomato sauce and paste (not low-sodium or reduced-sodium). Regular tomato and vegetable juice (not low-sodium or reduced-sodium). Pickles. Olives.  Fruits  Canned fruit in a light or heavy syrup. Fried fruit. Fruit in cream or butter sauce.  Meat and other protein foods  Fatty cuts of meat. Ribs. Fried meat. Mclean. Sausage. Bologna and other processed lunch meats. Salami. Fatback. Hotdogs. Bratwurst. Salted nuts and seeds. Canned beans with added salt. Canned or smoked fish. Whole eggs or egg yolks. Chicken or turkey with skin.  Dairy  Whole or 2% milk, cream, and half-and-half. Whole or full-fat cream cheese. Whole-fat or sweetened yogurt. Full-fat cheese. Nondairy creamers. Whipped toppings.  Processed cheese and cheese spreads.  Fats and oils  Butter. Stick margarine. Lard. Shortening. Ghee. Mclean fat. Tropical oils, such as coconut, palm kernel, or palm oil.  Seasoning and other foods  Salted popcorn and pretzels. Onion salt, garlic salt, seasoned salt, table salt, and sea salt. Worcestershire sauce. Tartar sauce. Barbecue sauce. Teriyaki sauce. Soy sauce, including reduced-sodium. Steak sauce. Canned and packaged gravies. Fish sauce. Oyster sauce. Cocktail sauce. Horseradish that you find on the shelf. Ketchup. Mustard. Meat flavorings and tenderizers. Bouillon cubes. Hot sauce and Tabasco sauce. Premade or packaged marinades. Premade or packaged taco seasonings. Relishes. Regular salad dressings.  Where to find more information:  · National Heart, Lung, and Blood [place]: www.nhlbi.nih.gov  · American Heart Association: www.heart.org  Summary  · The DASH eating plan is a healthy eating plan that has been shown to reduce high blood pressure (hypertension). It may also reduce your risk for type 2 diabetes, heart disease, and stroke.  · With the DASH eating plan, you should limit salt (sodium) intake to 2,300 mg a day. If you have hypertension, you may need to reduce your sodium intake to 1,500 mg a day.  · When on the DASH eating plan, aim to eat more fresh fruits and vegetables, whole grains, lean proteins, low-fat dairy, and heart-healthy fats.  · Work with your health care provider or diet and nutrition specialist (dietitian) to adjust your eating plan to your individual calorie needs.  This information is not intended to replace advice given to you by your health care provider. Make sure you discuss any questions you have with your health care provider.  Document Released: 12/06/2012 Document Revised: 12/11/2017 Document Reviewed: 12/11/2017  [employer] Patient Education © 2019 [employer]

## 2019-07-09 NOTE — PROGRESS NOTES
Subjective     Jermaine Pichardo is a 49 y.o. male who presents for a complete physical exam.      History of Present Illness     Reviewed labs.  He was not fasting.     Asthma.  He uses albuterol every morning.  He continues to smoke.    Review of Systems   Constitutional: Negative.    HENT: Negative.    Eyes: Negative.    Respiratory: Positive for shortness of breath.    Cardiovascular: Negative.    Endocrine: Negative.    Genitourinary: Negative.    Musculoskeletal: Negative.    Skin: Negative.    Allergic/Immunologic: Negative.    Neurological: Negative.    Hematological: Negative.    Psychiatric/Behavioral: Negative.        The following portions of the patient's history were reviewed and updated as appropriate: allergies, current medications, past family history, past medical history, past social history, past surgical history and problem list.  Health maintenance tab was reviewed and updated with the patient.       Patient Active Problem List    Diagnosis Date Noted   • Mild episode of recurrent major depressive disorder (CMS/[facility]) 01/08/2019   • DDD (degenerative disc disease), lumbar 09/14/2018   • Withdrawal from opioids (CMS/[facility]) 07/27/2017   • Low vitamin B12 level 02/14/2017   • Anxiety 02/11/2016     Note Last Updated: 2/11/2016     Impression: 10/27/2015 - Well controlled on current dose of sertraline.  Impression: 04/29/2014 - refill zoloft.;      • Asthma 02/11/2016     Note Last Updated: 2/11/2016     Impression: 10/27/2015 - This is not well controlled.  He is smoking again and knows that he needs to quit.  I have really encouraged him to do so.  I am prescribing him Singulair.  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 [employer]   Tobacco Use   • Smoking status: Current Every Day Smoker     Types: Cigarettes   • Smokeless tobacco: Never Used   • Tobacco comment: started at age 41   Substance and Sexual Activity   • Alcohol use: Yes     Frequency: 4 or more times a week     Drinks per session: 1 or 2     Comment: 2-3/day   • Drug use: No       Current Outpatient Medications on File Prior to Visit   Medication Sig Dispense Refill   • albuterol (PROAIR HFA) 108 (90 Base) MCG/ACT inhaler INHALE 1 PUFF BY MOUTH EVERY 4 HOURS AS NEEDED 8.5 inhaler 3   • buprenorphine-naloxone (SUBOXONE) 8-2 MG per SL tablet      • lisinopril (PRINIVIL,ZESTRIL) 5 MG tablet TAKE 1 TABLET BY MOUTH DAILY. 90 tablet 1   • sertraline (ZOLOFT) 100 MG tablet TAKE 1 TABLET EVERY DAY 90 tablet 1     No current facility-administered medications on file prior to visit.        No Known Allergies    Immunization History   Administered Date(s) Administered   • Pneumococcal Polysaccharide (PPSV23) 04/27/2017   • Td 03/01/2009   • Tdap 01/08/2019   • flucelvax quad pfs =>4 YRS 01/08/2019       Objective     /90   Pulse 78   Ht 193 cm (75.98\")   Wt 80.7 kg (178 lb)   SpO2 98%   BMI 21.68 kg/m²     Physical Exam   Constitutional: He is oriented to person, place, and time. He appears well-developed and well-nourished.   HENT:   Head: Normocephalic and atraumatic.   Right Ear: Hearing and tympanic membrane normal.   Left Ear: Hearing and tympanic membrane normal.   Mouth/Throat: No posterior oropharyngeal erythema.   Neck: Neck supple. No thyromegaly present.   Cardiovascular: Normal rate, regular rhythm and normal heart sounds.   No murmur heard.  Pulmonary/Chest: Effort normal and breath sounds normal. "   Abdominal: Soft. He exhibits no distension. There is no hepatosplenomegaly. There is no tenderness.   Lymphadenopathy:     He has no cervical adenopathy.   Neurological: He is alert and oriented to person, place, and time.   Skin: Skin is warm and dry.   Psychiatric: He has a normal mood and affect. His speech is normal and behavior is normal. Judgment and thought content normal. Cognition and memory are normal.       Assessment/Plan   Jermaine was seen today for annual exam.    Diagnoses and all orders for this visit:    Well adult exam        Discussion    Patient presents today for a CPE.      Patient follows an adequate diet.   Patient follows an adequate exercise regimen. Prostate cancer screening is not yet indicated.   Immunizations are up to date.   We discussed new colon cancer screening guidelines from the [employer] starting at age 45.  I offered referral for colon cancer screening.  The patient will check with insurance and let me know if I can put in referral.     Asthma.  Not well controlled.  Add Breo inhaler daily.  Samples provided.  Use albuterol for prn breakthrough use only.  We discussed smoking cessation and I suggest patches OTC.        Health Maintenance   Topic Date Due   • ANNUAL PHYSICAL  02/15/2018   • INFLUENZA VACCINE  08/01/2019   • TDAP/TD VACCINES (2 - Td) 01/08/2029   • PNEUMOCOCCAL VACCINE (19-64 MEDIUM RISK)  Completed            No future appointments.

## 2019-07-10 DIAGNOSIS — E53.8 LOW VITAMIN B12 LEVEL: Primary | ICD-10-CM

## 2019-07-17 RX ORDER — SERTRALINE HYDROCHLORIDE 100 MG/1
100 TABLET, FILM COATED ORAL DAILY
Qty: 30 TABLET | Refills: 1 | Status: SHIPPED | OUTPATIENT
Start: 2019-07-17 | End: 2019-08-06 | Stop reason: SDUPTHER

## 2019-08-06 RX ORDER — SERTRALINE HYDROCHLORIDE 100 MG/1
150 TABLET, FILM COATED ORAL DAILY
Qty: 45 TABLET | Refills: 1 | Status: SHIPPED | OUTPATIENT
Start: 2019-08-06 | End: 2019-08-09 | Stop reason: SDUPTHER

## 2019-08-09 RX ORDER — SERTRALINE HYDROCHLORIDE 100 MG/1
150 TABLET, FILM COATED ORAL DAILY
Qty: 135 TABLET | Refills: 1 | Status: SHIPPED | OUTPATIENT
Start: 2019-08-09 | End: 2019-11-08 | Stop reason: SDUPTHER

## 2019-10-10 RX ORDER — ALBUTEROL SULFATE 90 UG/1
1 AEROSOL, METERED RESPIRATORY (INHALATION) EVERY 4 HOURS PRN
Qty: 8.5 INHALER | Refills: 3 | Status: SHIPPED | OUTPATIENT
Start: 2019-10-10 | End: 2020-04-15

## 2019-11-04 DIAGNOSIS — I10 ESSENTIAL HYPERTENSION: ICD-10-CM

## 2019-11-04 RX ORDER — LISINOPRIL 5 MG/1
5 TABLET ORAL DAILY
Qty: 90 TABLET | Refills: 1 | Status: SHIPPED | OUTPATIENT
Start: 2019-11-04 | End: 2020-06-15

## 2019-11-08 RX ORDER — SERTRALINE HYDROCHLORIDE 100 MG/1
150 TABLET, FILM COATED ORAL DAILY
Qty: 135 TABLET | Refills: 1 | Status: SHIPPED | OUTPATIENT
Start: 2019-11-08 | End: 2020-11-08

## 2020-04-15 RX ORDER — ALBUTEROL SULFATE 90 UG/1
1 AEROSOL, METERED RESPIRATORY (INHALATION) EVERY 4 HOURS PRN
Qty: 8.5 INHALER | Refills: 3 | Status: SHIPPED | OUTPATIENT
Start: 2020-04-15 | End: 2020-12-18

## 2020-05-20 ENCOUNTER — TELEPHONE (OUTPATIENT)
Dept: INTERNAL MEDICINE | Facility: CLINIC | Age: 50
End: 2020-05-20

## 2020-05-20 NOTE — TELEPHONE ENCOUNTER
Message left for patient it is okay for his wife to  paperwork.  She will have to follow protocol to enter the building.

## 2020-05-20 NOTE — TELEPHONE ENCOUNTER
Patient called in regards to request if his wife could  his [place] paperwork.  Please return call and advise.

## 2020-06-14 DIAGNOSIS — I10 ESSENTIAL HYPERTENSION: ICD-10-CM

## 2020-06-15 RX ORDER — LISINOPRIL 5 MG/1
TABLET ORAL
Qty: 90 TABLET | Refills: 1 | Status: SHIPPED | OUTPATIENT
Start: 2020-06-15 | End: 2021-03-01

## 2020-07-06 DIAGNOSIS — E53.8 B12 DEFICIENCY: ICD-10-CM

## 2020-07-06 DIAGNOSIS — Z00.00 HEALTH CARE MAINTENANCE: Primary | ICD-10-CM

## 2020-08-11 ENCOUNTER — OFFICE VISIT (OUTPATIENT)
Dept: INTERNAL MEDICINE | Facility: CLINIC | Age: 50
End: 2020-08-11

## 2020-08-11 VITALS
DIASTOLIC BLOOD PRESSURE: 88 MMHG | HEART RATE: 88 BPM | OXYGEN SATURATION: 98 % | BODY MASS INDEX: 22.41 KG/M2 | SYSTOLIC BLOOD PRESSURE: 130 MMHG | HEIGHT: 76 IN | WEIGHT: 184 LBS

## 2020-08-11 DIAGNOSIS — J45.20 MILD INTERMITTENT ASTHMA WITHOUT COMPLICATION: Primary | ICD-10-CM

## 2020-08-11 PROCEDURE — 99213 OFFICE O/P EST LOW 20 MIN: CPT | Performed by: INTERNAL MEDICINE

## 2020-08-11 NOTE — PROGRESS NOTES
"Subjective     Jermaine Pichardo is a 50 y.o. male who presents with No chief complaint on file.      History of Present Illness     He has asthma.  He had an asthma attack and took time off of work.  He is on Breo and uses breakthrough albuterol as needs.  Resolved within a couple days.  He feels fine now.      Review of Systems   Constitutional: Negative for fever.   Respiratory: Negative.    Cardiovascular: Negative.        The following portions of the patient's history were reviewed and updated as appropriate: allergies, current medications and problem list.    Patient Active Problem List    Diagnosis Date Noted   • Depression with anxiety 01/08/2019   • DDD (degenerative disc disease), lumbar 09/14/2018   • Withdrawal from opioids (CMS/[facility]) 07/27/2017   • Low vitamin B12 level 02/14/2017   • Asthma 02/11/2016     Note Last Updated: 2/11/2016     Impression: 10/27/2015 - This is not well controlled.  He is smoking again and knows that he needs to quit.  I have really encouraged him to do so.  I am prescribing him Singulair.  Impression: 04/29/2014 - Refill inhaler.;      • Essential hypertension 02/11/2016       Current Outpatient Medications on File Prior to Visit   Medication Sig Dispense Refill   • ALBUTEROL SULFATE  (90 Base) MCG/ACT inhaler INHALE 1 PUFF EVERY 4 (FOUR) HOURS AS NEEDED FOR WHEEZING. 8.5 inhaler 3   • buprenorphine-naloxone (SUBOXONE) 8-2 MG per SL tablet      • Fluticasone Furoate-Vilanterol (Breo Ellipta) 200-25 MCG/INH inhaler Inhale 1 puff Daily. 1 inhaler 11   • lisinopril (PRINIVIL,ZESTRIL) 5 MG tablet TAKE 1 TABLET BY MOUTH EVERY DAY 90 tablet 1   • sertraline (ZOLOFT) 100 MG tablet Take 1.5 tablets by mouth Daily. 135 tablet 1     No current facility-administered medications on file prior to visit.        Objective     /88   Pulse 88   Ht 193 cm (75.98\")   Wt 83.5 kg (184 lb)   SpO2 98%   BMI 22.41 kg/m²     Physical Exam   Constitutional: He is oriented to person, " place, and time. He appears well-developed and well-nourished.   HENT:   Head: Atraumatic.   Cardiovascular: Normal rate, regular rhythm and normal heart sounds.   Pulmonary/Chest: Effort normal and breath sounds normal.   Neurological: He is alert and oriented to person, place, and time.   Skin: Skin is warm and dry.   Psychiatric: He has a normal mood and affect.       Assessment/Plan   Diagnoses and all orders for this visit:    Mild intermittent asthma without complication        Discussion    Patient presents in f/u of asthma.  He had recent exacerbation requiring a couple days off work.  He will continue Breo and prn albuterol for now.         No future appointments.

## 2020-11-08 RX ORDER — SERTRALINE HYDROCHLORIDE 100 MG/1
TABLET, FILM COATED ORAL
Qty: 135 TABLET | Refills: 1 | Status: SHIPPED | OUTPATIENT
Start: 2020-11-08 | End: 2021-05-14

## 2020-12-18 RX ORDER — ALBUTEROL SULFATE 90 UG/1
1 AEROSOL, METERED RESPIRATORY (INHALATION) EVERY 4 HOURS PRN
Qty: 17 G | Refills: 3 | Status: SHIPPED | OUTPATIENT
Start: 2020-12-18 | End: 2021-11-08

## 2021-02-28 DIAGNOSIS — I10 ESSENTIAL HYPERTENSION: ICD-10-CM

## 2021-03-01 RX ORDER — LISINOPRIL 5 MG/1
TABLET ORAL
Qty: 90 TABLET | Refills: 1 | Status: SHIPPED | OUTPATIENT
Start: 2021-03-01 | End: 2021-09-09

## 2021-05-14 ENCOUNTER — TELEPHONE (OUTPATIENT)
Dept: INTERNAL MEDICINE | Facility: CLINIC | Age: 51
End: 2021-05-14

## 2021-05-14 RX ORDER — SERTRALINE HYDROCHLORIDE 100 MG/1
TABLET, FILM COATED ORAL
Qty: 135 TABLET | Refills: 0 | Status: SHIPPED | OUTPATIENT
Start: 2021-05-14 | End: 2021-09-15

## 2021-05-14 NOTE — TELEPHONE ENCOUNTER
OK for HUB to read    Left voice mail for patient to call office and schedule a physical with fasting labs prior with Dr. Sadler

## 2021-09-09 DIAGNOSIS — I10 ESSENTIAL HYPERTENSION: ICD-10-CM

## 2021-09-09 RX ORDER — LISINOPRIL 5 MG/1
TABLET ORAL
Qty: 90 TABLET | Refills: 1 | Status: SHIPPED | OUTPATIENT
Start: 2021-09-09 | End: 2022-03-18 | Stop reason: SDUPTHER

## 2021-09-15 RX ORDER — SERTRALINE HYDROCHLORIDE 100 MG/1
TABLET, FILM COATED ORAL
Qty: 135 TABLET | Refills: 0 | Status: SHIPPED | OUTPATIENT
Start: 2021-09-15 | End: 2022-01-05 | Stop reason: SDUPTHER

## 2021-11-08 RX ORDER — ALBUTEROL SULFATE 90 UG/1
1 AEROSOL, METERED RESPIRATORY (INHALATION) EVERY 4 HOURS PRN
Qty: 18 G | Refills: 2 | Status: SHIPPED | OUTPATIENT
Start: 2021-11-08 | End: 2022-11-14 | Stop reason: SDUPTHER

## 2021-12-03 ENCOUNTER — APPOINTMENT (OUTPATIENT)
Dept: VACCINE CLINIC | Facility: HOSPITAL | Age: 51
End: 2021-12-03

## 2021-12-15 ENCOUNTER — IMMUNIZATION (OUTPATIENT)
Dept: VACCINE CLINIC | Facility: HOSPITAL | Age: 51
End: 2021-12-15

## 2021-12-15 PROCEDURE — 0004A HC ADM SARSCOV2 30MCG/0.3ML BOOSTER: CPT | Performed by: INTERNAL MEDICINE

## 2021-12-15 PROCEDURE — 91300 HC SARSCOV02 VAC 30MCG/0.3ML IM: CPT | Performed by: INTERNAL MEDICINE

## 2022-01-05 RX ORDER — SERTRALINE HYDROCHLORIDE 100 MG/1
150 TABLET, FILM COATED ORAL DAILY
Qty: 135 TABLET | Refills: 2 | Status: SHIPPED | OUTPATIENT
Start: 2022-01-05 | End: 2022-04-19

## 2022-03-18 DIAGNOSIS — I10 ESSENTIAL HYPERTENSION: ICD-10-CM

## 2022-03-18 RX ORDER — LISINOPRIL 5 MG/1
5 TABLET ORAL DAILY
Qty: 90 TABLET | Refills: 1 | Status: SHIPPED | OUTPATIENT
Start: 2022-03-18 | End: 2022-03-29 | Stop reason: SDUPTHER

## 2022-03-23 ENCOUNTER — TELEPHONE (OUTPATIENT)
Dept: INTERNAL MEDICINE | Facility: CLINIC | Age: 52
End: 2022-03-23

## 2022-03-23 NOTE — TELEPHONE ENCOUNTER
Patient is calling with blood pressure readings.    149/83 today.     No symptoms. Just anxiety.     Please advise. Patient has't been seen since 8/2020.

## 2022-03-29 ENCOUNTER — OFFICE VISIT (OUTPATIENT)
Dept: INTERNAL MEDICINE | Facility: CLINIC | Age: 52
End: 2022-03-29

## 2022-03-29 VITALS
HEART RATE: 66 BPM | BODY MASS INDEX: 24.96 KG/M2 | DIASTOLIC BLOOD PRESSURE: 80 MMHG | SYSTOLIC BLOOD PRESSURE: 150 MMHG | HEIGHT: 76 IN | WEIGHT: 205 LBS

## 2022-03-29 DIAGNOSIS — Z12.5 ENCOUNTER FOR PROSTATE CANCER SCREENING: ICD-10-CM

## 2022-03-29 DIAGNOSIS — Z12.11 COLON CANCER SCREENING: ICD-10-CM

## 2022-03-29 DIAGNOSIS — I10 ESSENTIAL HYPERTENSION: ICD-10-CM

## 2022-03-29 DIAGNOSIS — E53.8 LOW VITAMIN B12 LEVEL: ICD-10-CM

## 2022-03-29 DIAGNOSIS — Z00.00 LABORATORY TESTS ORDERED AS PART OF A COMPLETE PHYSICAL EXAM (CPE): ICD-10-CM

## 2022-03-29 DIAGNOSIS — Z00.00 WELL ADULT EXAM: Primary | ICD-10-CM

## 2022-03-29 PROCEDURE — 93000 ELECTROCARDIOGRAM COMPLETE: CPT | Performed by: INTERNAL MEDICINE

## 2022-03-29 PROCEDURE — 99396 PREV VISIT EST AGE 40-64: CPT | Performed by: INTERNAL MEDICINE

## 2022-03-29 RX ORDER — LISINOPRIL 10 MG/1
10 TABLET ORAL DAILY
Qty: 90 TABLET | Refills: 3 | Status: SHIPPED | OUTPATIENT
Start: 2022-03-29 | End: 2022-04-08 | Stop reason: SDUPTHER

## 2022-03-29 RX ORDER — TAMSULOSIN HYDROCHLORIDE 0.4 MG/1
1 CAPSULE ORAL DAILY
Qty: 30 CAPSULE | Refills: 5 | Status: SHIPPED | OUTPATIENT
Start: 2022-03-29 | End: 2022-07-05

## 2022-03-29 NOTE — PROGRESS NOTES
Subjective     Jermaine Pichardo is a 52 y.o. male who presents for a complete physical exam.      History of Present Illness     HTN.  Not well controlled.  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      Asthma.  He is not taking albuterol.  He uses an albuterol inhaler once every morning.      Difficulty with urination.  Trouble with emptying bladder.  Bladder not empyting well.  No dysuria.      Ankle puffiness.  At end of day.  Only SOA is with asthma.        Review of Systems   Constitutional: Negative.    HENT: Negative.    Eyes: Negative.    Respiratory: Positive for shortness of breath and wheezing.    Cardiovascular: Positive for leg swelling.   Gastrointestinal: Negative.    Endocrine: Negative.    Genitourinary: Positive for difficulty urinating.   Musculoskeletal: Negative.    Skin: Negative.    Allergic/Immunologic: Negative.    Neurological: Negative.    Hematological: Negative.    Psychiatric/Behavioral: Negative.        The following portions of the patient's history were reviewed and updated as appropriate: allergies, current medications, past family history, past medical history, past social history, past surgical history and problem list.  Health maintenance tab was reviewed and updated with the patient.       Patient Active Problem List    Diagnosis Date Noted   • Depression with anxiety 01/08/2019   • DDD (degenerative disc disease), lumbar 09/14/2018   • Withdrawal from opioids (HCC) 07/27/2017   • Low vitamin B12 level 02/14/2017   • Asthma 02/11/2016     Note Last Updated: 2/11/2016     Impression: 10/27/2015 - This is not well controlled.  He is smoking again and knows that he needs to quit.  I have really encouraged him to do so.  I am prescribing him Singulair.  Impression: 04/29/2014 - Refill inhaler.;      • Essential hypertension 02/11/2016       Past Medical History:   Diagnosis Date   • Actinic keratosis    • Anxiety    • Asthma        Past Surgical History:   Procedure  "Laterality Date   • MANDIBLE SURGERY         Family History   Problem Relation Age of Onset   • Diabetes Mother    • Colon cancer Paternal Grandfather        Social History     Socioeconomic History   • Marital status:    • Number of children: 3   Tobacco Use   • Smoking status: Current Every Day Smoker     Types: Cigarettes   • Smokeless tobacco: Never Used   • Tobacco comment: started at age 41   Substance and Sexual Activity   • Alcohol use: Yes     Comment: 2-3/day   • Drug use: No       Current Outpatient Medications on File Prior to Visit   Medication Sig Dispense Refill   • albuterol sulfate  (90 Base) MCG/ACT inhaler INHALE 1 PUFF EVERY 4 (FOUR) HOURS AS NEEDED FOR WHEEZING. 18 g 2   • buprenorphine-naloxone (SUBOXONE) 8-2 MG per SL tablet      • sertraline (ZOLOFT) 100 MG tablet Take 1.5 tablets by mouth Daily. 135 tablet 2   • [DISCONTINUED] Fluticasone Furoate-Vilanterol (Breo Ellipta) 200-25 MCG/INH inhaler Inhale 1 puff Daily. 1 inhaler 11   • [DISCONTINUED] lisinopril (PRINIVIL,ZESTRIL) 5 MG tablet Take 1 tablet by mouth Daily. 90 tablet 1     No current facility-administered medications on file prior to visit.       No Known Allergies    Immunization History   Administered Date(s) Administered   • COVID-19 (PFIZER) PURPLE CAP 03/20/2021, 04/10/2021, 12/15/2021   • Pneumococcal Polysaccharide (PPSV23) 04/27/2017   • Td 03/01/2009   • Tdap 01/08/2019   • flucelvax quad pfs =>4 YRS 01/08/2019       Objective     /80   Pulse 66   Ht 193 cm (76\")   Wt 93 kg (205 lb)   BMI 24.95 kg/m²     Physical Exam  Constitutional:       Appearance: He is well-developed.   HENT:      Head: Normocephalic and atraumatic.      Right Ear: Hearing and tympanic membrane normal.      Left Ear: Hearing and tympanic membrane normal.      Mouth/Throat:      Pharynx: No posterior oropharyngeal erythema.   Neck:      Thyroid: No thyromegaly.   Cardiovascular:      Rate and Rhythm: Normal rate and regular " rhythm.      Heart sounds: Normal heart sounds. No murmur heard.  Pulmonary:      Effort: Pulmonary effort is normal.      Breath sounds: Normal breath sounds.   Abdominal:      General: There is no distension.      Palpations: Abdomen is soft.      Tenderness: There is no abdominal tenderness.   Genitourinary:     Penis: Normal.       Testes: Normal.      Comments: He declines rectal exam.    Musculoskeletal:      Cervical back: Neck supple.   Lymphadenopathy:      Cervical: No cervical adenopathy.   Skin:     General: Skin is warm and dry.   Neurological:      Mental Status: He is alert and oriented to person, place, and time.   Psychiatric:         Speech: Speech normal.         Behavior: Behavior normal.         Thought Content: Thought content normal.         Judgment: Judgment normal.       ECG 12 Lead    Date/Time: 3/29/2022 9:11 AM  Performed by: Neena Sadler MD  Authorized by: Neena Sadler MD   Comparison: not compared with previous ECG   Previous ECG: no previous ECG available  Rhythm: sinus rhythm  Rate: normal  Conduction: conduction normal  ST Segments: ST segments normal  T Waves: T waves normal  QRS axis: normal    Clinical impression: normal ECG              Assessment/Plan   Diagnoses and all orders for this visit:    1. Well adult exam (Primary)    2. Colon cancer screening  -     Ambulatory Referral For Screening Colonoscopy    3. Laboratory tests ordered as part of a complete physical exam (CPE)  -     CBC & Differential  -     Comprehensive Metabolic Panel  -     Lipid Panel  -     TSH Rfx On Abnormal To Free T4  -     Urinalysis With Microscopic - Urine, Clean Catch  -     PSA Screen  -     Hepatitis C Antibody  -     Vitamin B12  -     Methylmalonic Acid, Serum  -     Intrinsic Factor Ab    4. Encounter for prostate cancer screening  -     PSA Screen    5. Low vitamin B12 level  -     Vitamin B12  -     Methylmalonic Acid, Serum  -     Intrinsic Factor Ab    6. Essential  hypertension  Comments:  His BP has been elevated for a while.  I am starting him on Lisinopril and will see him back in 6 weeks.  Advised to stop smoking, inc exercise, limit salt  Orders:  -     lisinopril (PRINIVIL,ZESTRIL) 10 MG tablet; Take 1 tablet by mouth Daily.  Dispense: 90 tablet; Refill: 3    Other orders  -     Fluticasone Furoate-Vilanterol (Breo Ellipta) 200-25 MCG/INH inhaler; Inhale 1 puff Daily.  Dispense: 1 each; Refill: 11  -     tamsulosin (FLOMAX) 0.4 MG capsule 24 hr capsule; Take 1 capsule by mouth Daily.  Dispense: 30 capsule; Refill: 5  -     ECG 12 Lead        Discussion    Patient presents today for a CPE.      Patient follows an adequate diet.   Patient follows an adequate exercise regimen. Prostate cancer screening is up to date.  Patient has been referred for colon cancer screening.   I have recommended that the patient get the following immunizations:  Shingrix.  HTN.  Increase Lisinopril to 10mg daily.   Difficulty urinating.  Check labs.  Add Flomax.  Exam next visit.  Declined today.   Asthma.  Restart Breo.    F/U OV in ten days to recheck BP.        Health Maintenance   Topic Date Due   • COLORECTAL CANCER SCREENING  Never done   • HEPATITIS C SCREENING  Never done   • ANNUAL PHYSICAL  07/10/2020   • ZOSTER VACCINE (1 of 2) Never done   • INFLUENZA VACCINE  03/31/2022 (Originally 8/1/2021)   • TDAP/TD VACCINES (3 - Td or Tdap) 01/08/2029   • Pneumococcal Vaccine 0-64 (2 of 2 - PPSV23) 01/29/2036   • COVID-19 Vaccine  Completed            Future Appointments   Date Time Provider Department Center   4/8/2022 11:00 AM Neena Sadler MD [facility]

## 2022-04-03 LAB
ALBUMIN SERPL-MCNC: 4.5 G/DL (ref 3.8–4.9)
ALBUMIN/GLOB SERPL: 1.8 {RATIO} (ref 1.2–2.2)
ALP SERPL-CCNC: 111 IU/L (ref 44–121)
ALT SERPL-CCNC: 22 IU/L (ref 0–44)
APPEARANCE UR: CLEAR
AST SERPL-CCNC: 25 IU/L (ref 0–40)
BACTERIA #/AREA URNS HPF: NORMAL /[HPF]
BASOPHILS # BLD AUTO: 0.1 X10E3/UL (ref 0–0.2)
BASOPHILS NFR BLD AUTO: 2 %
BILIRUB SERPL-MCNC: <0.2 MG/DL (ref 0–1.2)
BILIRUB UR QL STRIP: NEGATIVE
BUN SERPL-MCNC: 11 MG/DL (ref 6–24)
BUN/CREAT SERPL: 14 (ref 9–20)
CALCIUM SERPL-MCNC: 9.7 MG/DL (ref 8.7–10.2)
CASTS URNS QL MICRO: NORMAL /LPF
CHLORIDE SERPL-SCNC: 101 MMOL/L (ref 96–106)
CHOLEST SERPL-MCNC: 268 MG/DL (ref 100–199)
CO2 SERPL-SCNC: 26 MMOL/L (ref 20–29)
COLOR UR: YELLOW
CREAT SERPL-MCNC: 0.8 MG/DL (ref 0.76–1.27)
EGFRCR SERPLBLD CKD-EPI 2021: 107 ML/MIN/1.73
EOSINOPHIL # BLD AUTO: 0.8 X10E3/UL (ref 0–0.4)
EOSINOPHIL NFR BLD AUTO: 9 %
EPI CELLS #/AREA URNS HPF: NORMAL /HPF (ref 0–10)
ERYTHROCYTE [DISTWIDTH] IN BLOOD BY AUTOMATED COUNT: 12.2 % (ref 11.6–15.4)
GLOBULIN SER CALC-MCNC: 2.5 G/DL (ref 1.5–4.5)
GLUCOSE SERPL-MCNC: 96 MG/DL (ref 65–99)
GLUCOSE UR QL STRIP: NEGATIVE
HCT VFR BLD AUTO: 37.8 % (ref 37.5–51)
HCV AB S/CO SERPL IA: <0.1 S/CO RATIO (ref 0–0.9)
HDLC SERPL-MCNC: 54 MG/DL
HGB BLD-MCNC: 13.2 G/DL (ref 13–17.7)
HGB UR QL STRIP: NEGATIVE
IF BLOCK AB SER-ACNC: 1 AU/ML (ref 0–1.1)
IMM GRANULOCYTES # BLD AUTO: 0 X10E3/UL (ref 0–0.1)
IMM GRANULOCYTES NFR BLD AUTO: 0 %
KETONES UR QL STRIP: NEGATIVE
LDLC SERPL CALC-MCNC: 174 MG/DL (ref 0–99)
LEUKOCYTE ESTERASE UR QL STRIP: NEGATIVE
LYMPHOCYTES # BLD AUTO: 1.6 X10E3/UL (ref 0.7–3.1)
LYMPHOCYTES NFR BLD AUTO: 18 %
MCH RBC QN AUTO: 32.2 PG (ref 26.6–33)
MCHC RBC AUTO-ENTMCNC: 34.9 G/DL (ref 31.5–35.7)
MCV RBC AUTO: 92 FL (ref 79–97)
METHYLMALONATE SERPL-SCNC: 271 NMOL/L (ref 0–378)
MICRO URNS: NORMAL
MICRO URNS: NORMAL
MONOCYTES # BLD AUTO: 0.9 X10E3/UL (ref 0.1–0.9)
MONOCYTES NFR BLD AUTO: 10 %
NEUTROPHILS # BLD AUTO: 5.4 X10E3/UL (ref 1.4–7)
NEUTROPHILS NFR BLD AUTO: 61 %
NITRITE UR QL STRIP: NEGATIVE
PH UR STRIP: 5.5 [PH] (ref 5–7.5)
PLATELET # BLD AUTO: 285 X10E3/UL (ref 150–450)
POTASSIUM SERPL-SCNC: 4.5 MMOL/L (ref 3.5–5.2)
PROT SERPL-MCNC: 7 G/DL (ref 6–8.5)
PROT UR QL STRIP: NEGATIVE
PSA SERPL-MCNC: 0.3 NG/ML (ref 0–4)
RBC # BLD AUTO: 4.1 X10E6/UL (ref 4.14–5.8)
RBC #/AREA URNS HPF: NORMAL /HPF (ref 0–2)
SODIUM SERPL-SCNC: 143 MMOL/L (ref 134–144)
SP GR UR STRIP: 1.02 (ref 1–1.03)
TRIGL SERPL-MCNC: 212 MG/DL (ref 0–149)
TSH SERPL DL<=0.005 MIU/L-ACNC: 1.76 UIU/ML (ref 0.45–4.5)
UROBILINOGEN UR STRIP-MCNC: 0.2 MG/DL (ref 0.2–1)
VIT B12 SERPL-MCNC: 385 PG/ML (ref 232–1245)
VLDLC SERPL CALC-MCNC: 40 MG/DL (ref 5–40)
WBC # BLD AUTO: 8.8 X10E3/UL (ref 3.4–10.8)
WBC #/AREA URNS HPF: NORMAL /HPF (ref 0–5)

## 2022-04-08 ENCOUNTER — OFFICE VISIT (OUTPATIENT)
Dept: INTERNAL MEDICINE | Facility: CLINIC | Age: 52
End: 2022-04-08

## 2022-04-08 VITALS
HEIGHT: 76 IN | HEART RATE: 73 BPM | DIASTOLIC BLOOD PRESSURE: 80 MMHG | SYSTOLIC BLOOD PRESSURE: 120 MMHG | OXYGEN SATURATION: 98 % | BODY MASS INDEX: 24.96 KG/M2 | WEIGHT: 205 LBS

## 2022-04-08 DIAGNOSIS — E78.5 HYPERLIPIDEMIA, UNSPECIFIED HYPERLIPIDEMIA TYPE: ICD-10-CM

## 2022-04-08 DIAGNOSIS — R39.198 DIFFICULTY URINATING: ICD-10-CM

## 2022-04-08 DIAGNOSIS — I10 ESSENTIAL HYPERTENSION: Primary | ICD-10-CM

## 2022-04-08 PROCEDURE — 99214 OFFICE O/P EST MOD 30 MIN: CPT | Performed by: INTERNAL MEDICINE

## 2022-04-08 RX ORDER — LISINOPRIL 10 MG/1
10 TABLET ORAL DAILY
Qty: 90 TABLET | Refills: 3 | Status: SHIPPED | OUTPATIENT
Start: 2022-04-08 | End: 2023-02-08 | Stop reason: DRUGHIGH

## 2022-04-08 RX ORDER — LISINOPRIL 20 MG/1
20 TABLET ORAL DAILY
Qty: 90 TABLET | Refills: 3 | Status: SHIPPED | OUTPATIENT
Start: 2022-04-08 | End: 2022-04-08 | Stop reason: SDUPTHER

## 2022-04-08 NOTE — PROGRESS NOTES
Subjective     Jermaine Pichardo is a 52 y.o. male who presents with   Chief Complaint   Patient presents with   • Hypertension   • Hyperlipidemia       History of Present Illness     The following data was reviewed by: Neena Sadler MD on 04/08/2022:  Common labs    Common Labsle 3/29/22 3/29/22 3/29/22 3/29/22    [id number]   Glucose  96     BUN  11     Creatinine  0.80     Sodium  143     Potassium  4.5     Chloride  101     Calcium  9.7     Total Protein  7.0     Albumin  4.5     Total Bilirubin  <0.2     Alkaline Phosphatase  111     AST (SGOT)  25     ALT (SGPT)  22     WBC 8.8      Hemoglobin 13.2      Hematocrit 37.8      Platelets 285      Total Cholesterol   268 (A)    Triglycerides   212 (A)    HDL Cholesterol   54    LDL Cholesterol    174 (A)    PSA    0.3   (A) Abnormal value       Comments are available for some flowsheets but are not being displayed.           HTN.  Improved control on lisinopril 10 mg daily.    HLD.  Poor control.  He states he has room for improvement in diet.      Review of Systems   Respiratory: Negative.    Cardiovascular: Negative.        The following portions of the patient's history were reviewed and updated as appropriate: allergies, current medications and problem list.    Patient Active Problem List    Diagnosis Date Noted   • Hyperlipidemia 04/08/2022   • Depression with anxiety 01/08/2019   • DDD (degenerative disc disease), lumbar 09/14/2018   • Withdrawal from opioids (HCC) 07/27/2017   • Low vitamin B12 level 02/14/2017   • Asthma 02/11/2016     Note Last Updated: 2/11/2016     Impression: 10/27/2015 - This is not well controlled.  He is smoking again and knows that he needs to quit.  I have really encouraged him to do so.  I am prescribing him Singulair.  Impression: 04/29/2014 - Refill inhaler.;      • Essential hypertension 02/11/2016       Current Outpatient Medications on File Prior to Visit   Medication Sig Dispense Refill   • albuterol sulfate   "(90 Base) MCG/ACT inhaler INHALE 1 PUFF EVERY 4 (FOUR) HOURS AS NEEDED FOR WHEEZING. 18 g 2   • buprenorphine-naloxone (SUBOXONE) 8-2 MG per SL tablet      • Fluticasone Furoate-Vilanterol (Breo Ellipta) 200-25 MCG/INH inhaler Inhale 1 puff Daily. 1 each 11   • sertraline (ZOLOFT) 100 MG tablet Take 1.5 tablets by mouth Daily. 135 tablet 2   • tamsulosin (FLOMAX) 0.4 MG capsule 24 hr capsule Take 1 capsule by mouth Daily. 30 capsule 5   • [DISCONTINUED] lisinopril (PRINIVIL,ZESTRIL) 10 MG tablet Take 1 tablet by mouth Daily. 90 tablet 3     No current facility-administered medications on file prior to visit.       Objective     /80   Pulse 73   Ht 193 cm (75.98\")   Wt 93 kg (205 lb)   SpO2 98%   BMI 24.96 kg/m²     Physical Exam  Constitutional:       Appearance: He is well-developed.   HENT:      Head: Atraumatic.   Cardiovascular:      Rate and Rhythm: Normal rate and regular rhythm.      Heart sounds: Normal heart sounds.   Pulmonary:      Effort: Pulmonary effort is normal.      Breath sounds: Normal breath sounds.   Skin:     General: Skin is warm and dry.   Neurological:      Mental Status: He is alert and oriented to person, place, and time.         Assessment/Plan   Diagnoses and all orders for this visit:    1. Essential hypertension (Primary)  -     lisinopril (PRINIVIL,ZESTRIL) 10 MG tablet; Take 1 tablet by mouth Daily.  Dispense: 90 tablet; Refill: 3    2. Hyperlipidemia, unspecified hyperlipidemia type    3. Difficulty urinating  -     Ambulatory Referral to Urology        Discussion    HTN.  Improved control.  Continue lisinopril 10.    HLD.  We discussed diet.  I recommend a diet high in fruits, vegetables, whole grains, lean meats, nuts and beans.  I recommend limiting red meat, full fat dairy, eggs and processed white carbohydrates.  I recommend aerobic exercise at least 3 days per week. I also recommend to watch salt intake.    Difficulty urination.  Flomax no help.  Refer to urology " for opinion.           Future Appointments   Date Time Provider Department Center   7/8/2022  9:00 AM Neena Sadler MD [facility]

## 2022-04-19 RX ORDER — SERTRALINE HYDROCHLORIDE 100 MG/1
TABLET, FILM COATED ORAL
Qty: 135 TABLET | Refills: 2 | Status: SHIPPED | OUTPATIENT
Start: 2022-04-19 | End: 2023-03-01 | Stop reason: SDUPTHER

## 2022-07-05 RX ORDER — TAMSULOSIN HYDROCHLORIDE 0.4 MG/1
CAPSULE ORAL
Qty: 90 CAPSULE | Refills: 1 | Status: SHIPPED | OUTPATIENT
Start: 2022-07-05 | End: 2023-03-01

## 2022-07-08 RX ORDER — LEVALBUTEROL TARTRATE 45 UG/1
1-2 AEROSOL, METERED ORAL EVERY 4 HOURS PRN
Qty: 15 G | Refills: 11 | Status: SHIPPED | OUTPATIENT
Start: 2022-07-08 | End: 2023-04-04

## 2022-07-11 RX ORDER — ALBUTEROL SULFATE 90 UG/1
1 AEROSOL, METERED RESPIRATORY (INHALATION) EVERY 4 HOURS PRN
Qty: 18 G | Refills: 2 | Status: CANCELLED | OUTPATIENT
Start: 2022-07-11

## 2022-07-11 NOTE — TELEPHONE ENCOUNTER
Caller: Jermaine Pichardo    Relationship: Self     Best call back number: [phone]    Requested Prescriptions:   Requested Prescriptions     Pending Prescriptions Disp Refills   • albuterol sulfate  (90 Base) MCG/ACT inhaler 18 g 2     Sig: Inhale 1 puff Every 4 (Four) Hours As Needed for Wheezing.        Pharmacy where request should be sent: [facility] #[id number] - [place], [place] - [address] AT [facility] - [phone]  - [phone]      Additional details provided by patient: PT IS OUT OF MEDICATION.     Does the patient have less than a 3 day supply:  [x] Yes  [] No    Erik Buchanan Rep   07/11/22 14:47 EDT

## 2022-11-14 RX ORDER — ALBUTEROL SULFATE 90 UG/1
1 AEROSOL, METERED RESPIRATORY (INHALATION) EVERY 4 HOURS PRN
Qty: 18 G | Refills: 8 | Status: SHIPPED | OUTPATIENT
Start: 2022-11-14

## 2022-12-21 ENCOUNTER — OFFICE VISIT (OUTPATIENT)
Dept: INTERNAL MEDICINE | Facility: CLINIC | Age: 52
End: 2022-12-21

## 2022-12-21 VITALS
WEIGHT: 200 LBS | BODY MASS INDEX: 24.36 KG/M2 | OXYGEN SATURATION: 98 % | HEART RATE: 76 BPM | SYSTOLIC BLOOD PRESSURE: 134 MMHG | DIASTOLIC BLOOD PRESSURE: 98 MMHG | HEIGHT: 76 IN

## 2022-12-21 DIAGNOSIS — F19.90 SUBSTANCE USE DISORDER: Primary | ICD-10-CM

## 2022-12-21 PROCEDURE — 99213 OFFICE O/P EST LOW 20 MIN: CPT | Performed by: INTERNAL MEDICINE

## 2022-12-21 NOTE — PROGRESS NOTES
Subjective     Jermaine Pichardo is a 52 y.o. male who presents with   Chief Complaint   Patient presents with   • susbstance use       History of Present Illness     Patient presents requesting assistance in getting substance abuse help.  He currently uses alcohol daily, uses marijuana daily and is on Suboxone.  He is interested in getting help for his addiction from [facility] in [place], [place].       Review of Systems   Respiratory: Negative.    Cardiovascular: Negative.        The following portions of the patient's history were reviewed and updated as appropriate: allergies, current medications and problem list.    Patient Active Problem List    Diagnosis Date Noted   • Hyperlipidemia 04/08/2022   • Depression with anxiety 01/08/2019   • DDD (degenerative disc disease), lumbar 09/14/2018   • Withdrawal from opioids (HCC) 07/27/2017   • Low vitamin B12 level 02/14/2017   • Asthma 02/11/2016     Note Last Updated: 2/11/2016     Impression: 10/27/2015 - This is not well controlled.  He is smoking again and knows that he needs to quit.  I have really encouraged him to do so.  I am prescribing him Singulair.  Impression: 04/29/2014 - Refill inhaler.;      • Essential hypertension 02/11/2016       Current Outpatient Medications on File Prior to Visit   Medication Sig Dispense Refill   • albuterol sulfate  (90 Base) MCG/ACT inhaler Inhale 1 puff Every 4 (Four) Hours As Needed for Wheezing. 18 g 8   • buprenorphine-naloxone (SUBOXONE) 8-2 MG per SL tablet      • Fluticasone Furoate-Vilanterol (Breo Ellipta) 200-25 MCG/INH inhaler Inhale 1 puff Daily. 1 each 11   • levalbuterol (Xopenex HFA) 45 MCG/ACT inhaler Inhale 1-2 puffs Every 4 (Four) Hours As Needed for Wheezing. 15 g 11   • lisinopril (PRINIVIL,ZESTRIL) 10 MG tablet Take 1 tablet by mouth Daily. (Patient taking differently: Take 20 mg by mouth Daily.) 90 tablet 3   • sertraline (ZOLOFT) 100 MG tablet TAKE 1 AND 1/2 TABLETS BY MOUTH EVERY  tablet 2  "  • tamsulosin (FLOMAX) 0.4 MG capsule 24 hr capsule TAKE 1 CAPSULE BY MOUTH EVERY DAY 90 capsule 1     No current facility-administered medications on file prior to visit.       Objective     /98   Pulse 76   Ht 193 cm (75.98\")   Wt 90.7 kg (200 lb)   SpO2 98%   BMI 24.36 kg/m²     Physical Exam  Constitutional:       Appearance: He is well-developed.   HENT:      Head: Atraumatic.   Pulmonary:      Effort: Pulmonary effort is normal.   Skin:     Comments: Papular raised lesion on right hand   Neurological:      Mental Status: He is alert and oriented to person, place, and time.         Assessment & Plan   Diagnoses and all orders for this visit:    1. Substance use disorder (Primary)  -     Ambulatory Referral to Substance Abuse Counselor        Discussion    Patient presents with substance abuse.  I think he would benefit from inpatient treatment.  Referral is placed.  He would like to go to [facility] in [place], [place].    Papular raised skin lesion on hand.  He is given the number to [facility]ology associates to schedule an appointment.         No future appointments.      "

## 2023-01-16 ENCOUNTER — TELEPHONE (OUTPATIENT)
Dept: INTERNAL MEDICINE | Facility: CLINIC | Age: 53
End: 2023-01-16
Payer: COMMERCIAL

## 2023-01-16 RX ORDER — CLONIDINE HYDROCHLORIDE 0.1 MG/1
0.1 TABLET ORAL 2 TIMES DAILY
Qty: 60 TABLET | Refills: 0 | Status: SHIPPED | OUTPATIENT
Start: 2023-01-16 | End: 2023-03-01

## 2023-01-16 RX ORDER — TRAZODONE HYDROCHLORIDE 50 MG/1
50 TABLET ORAL NIGHTLY
Qty: 30 TABLET | Refills: 1 | Status: SHIPPED | OUTPATIENT
Start: 2023-01-16 | End: 2023-02-08 | Stop reason: SDUPTHER

## 2023-01-16 NOTE — TELEPHONE ENCOUNTER
I called and listened to his wife.  I advised her I would send in a medication to help.  She is advised to have him and make a f/u appointment.  MICHAEL

## 2023-01-16 NOTE — TELEPHONE ENCOUNTER
"  Caller: Dario Pichardo    Relationship: Emergency Contact    Best call back number: [phone]     What is the best time to reach you: EARLIER THE BETTER    Who are you requesting to speak with (clinical staff, provider,  specific staff member): DR ROQUE    Do you know the name of the person who called: DARIO PICHARDO    What was the call regarding: \"WHAT PATIENT IS GOING THROUGH RIGHT NOW.\"    Do you require a callback: YES.    PLEASE ADVISE.  "

## 2023-01-16 NOTE — TELEPHONE ENCOUNTER
He was recently at the [facility] and was unable to stay. He was getting trazadone to help him sleep while in there. Since he has been home he has not sleep for 2 days. Can you send him in a script to [facility]. She is not sure what the dose was because he left without paperwork.

## 2023-01-16 NOTE — TELEPHONE ENCOUNTER
[facility] patient's wife called and stated that she was wondering if Dr. Sadler could prescribe clonidine. He recently was discharged from the [facility]. She said he has increased anxiety and body aches. She said this medicine help with the withdrawal.  Is Dr. Sadler willing to prescribe this  Please let [facility] know the decision  Best call # [phone]

## 2023-01-18 ENCOUNTER — OFFICE VISIT (OUTPATIENT)
Dept: INTERNAL MEDICINE | Facility: CLINIC | Age: 53
End: 2023-01-18
Payer: COMMERCIAL

## 2023-01-18 VITALS
WEIGHT: 200 LBS | SYSTOLIC BLOOD PRESSURE: 144 MMHG | HEIGHT: 76 IN | OXYGEN SATURATION: 78 % | DIASTOLIC BLOOD PRESSURE: 88 MMHG | HEART RATE: 74 BPM | BODY MASS INDEX: 24.36 KG/M2

## 2023-01-18 DIAGNOSIS — F11.93 WITHDRAWAL FROM OPIOIDS: Primary | ICD-10-CM

## 2023-01-18 PROCEDURE — 99213 OFFICE O/P EST LOW 20 MIN: CPT | Performed by: INTERNAL MEDICINE

## 2023-01-18 RX ORDER — GABAPENTIN 100 MG/1
200 CAPSULE ORAL 3 TIMES DAILY
Qty: 180 CAPSULE | Refills: 0 | Status: SHIPPED | OUTPATIENT
Start: 2023-01-18 | End: 2023-02-27

## 2023-01-18 NOTE — PROGRESS NOTES
Subjective     Jermaine Pichardo is a 52 y.o. male who presents with   Chief Complaint   Patient presents with   • substance use disorder       History of Present Illness     Patient presents in f/u.  He was in the [facility] for 12 days and treated for withdrawal from suboxone and alcohol.  He did well during the admission.  He is current taking trazodone and clonidine.  He is still having substanial withdrawal symptoms.  Gabapentin work well during the admission.  He is off work until 2/12.      Review of Systems   Constitutional: Positive for chills, diaphoresis and fatigue.   Musculoskeletal: Positive for arthralgias.       The following portions of the patient's history were reviewed and updated as appropriate: allergies, current medications and problem list.    Patient Active Problem List    Diagnosis Date Noted   • Hyperlipidemia 04/08/2022   • Depression with anxiety 01/08/2019   • DDD (degenerative disc disease), lumbar 09/14/2018   • Withdrawal from opioids (HCC) 07/27/2017   • Low vitamin B12 level 02/14/2017   • Asthma 02/11/2016     Note Last Updated: 2/11/2016     Impression: 10/27/2015 - This is not well controlled.  He is smoking again and knows that he needs to quit.  I have really encouraged him to do so.  I am prescribing him Singulair.  Impression: 04/29/2014 - Refill inhaler.;      • Essential hypertension 02/11/2016       Current Outpatient Medications on File Prior to Visit   Medication Sig Dispense Refill   • albuterol sulfate  (90 Base) MCG/ACT inhaler Inhale 1 puff Every 4 (Four) Hours As Needed for Wheezing. 18 g 8   • buprenorphine-naloxone (SUBOXONE) 8-2 MG per SL tablet      • cloNIDine (Catapres) 0.1 MG tablet Take 1 tablet by mouth 2 (Two) Times a Day. 60 tablet 0   • Fluticasone Furoate-Vilanterol (Breo Ellipta) 200-25 MCG/INH inhaler Inhale 1 puff Daily. 1 each 11   • levalbuterol (Xopenex HFA) 45 MCG/ACT inhaler Inhale 1-2 puffs Every 4 (Four) Hours As Needed for Wheezing. 15 g 11   •  "lisinopril (PRINIVIL,ZESTRIL) 10 MG tablet Take 1 tablet by mouth Daily. (Patient taking differently: Take 20 mg by mouth Daily.) 90 tablet 3   • sertraline (ZOLOFT) 100 MG tablet TAKE 1 AND 1/2 TABLETS BY MOUTH EVERY  tablet 2   • tamsulosin (FLOMAX) 0.4 MG capsule 24 hr capsule TAKE 1 CAPSULE BY MOUTH EVERY DAY 90 capsule 1   • traZODone (DESYREL) 50 MG tablet Take 1 tablet by mouth Every Night. 30 tablet 1     No current facility-administered medications on file prior to visit.       Objective     /88   Pulse 74   Ht 193 cm (75.98\")   Wt 90.7 kg (200 lb)   SpO2 (!) 78%   BMI 24.36 kg/m²     Physical Exam  Constitutional:       Appearance: He is well-developed.   HENT:      Head: Atraumatic.   Pulmonary:      Effort: Pulmonary effort is normal.   Neurological:      Mental Status: He is alert and oriented to person, place, and time.         Assessment & Plan   Diagnoses and all orders for this visit:    1. Withdrawal from opioids (HCC) (Primary)  -     gabapentin (NEURONTIN) 100 MG capsule; Take 2 capsules by mouth 3 (Three) Times a Day.  Dispense: 180 capsule; Refill: 0        Discussion    Patient presents in f/u today.  He continues withdrawal from opioids.  Add gabapentin to his regimen.  Continue clonidine.  Continue trazodone.  OK to increase trazodone to 100 mg.  Planned return to work is 2/12/2022.  See back three weeks to reassess.         Future Appointments   Date Time Provider Department Center   2/8/2023  9:30 AM Neena Sadler MD [facility]         "

## 2023-02-08 ENCOUNTER — OFFICE VISIT (OUTPATIENT)
Dept: INTERNAL MEDICINE | Facility: CLINIC | Age: 53
End: 2023-02-08
Payer: COMMERCIAL

## 2023-02-08 VITALS
DIASTOLIC BLOOD PRESSURE: 86 MMHG | HEIGHT: 76 IN | BODY MASS INDEX: 23.38 KG/M2 | HEART RATE: 101 BPM | WEIGHT: 192 LBS | OXYGEN SATURATION: 99 % | SYSTOLIC BLOOD PRESSURE: 132 MMHG

## 2023-02-08 DIAGNOSIS — Z12.11 COLON CANCER SCREENING: ICD-10-CM

## 2023-02-08 DIAGNOSIS — F11.93 WITHDRAWAL FROM OPIOIDS: Primary | ICD-10-CM

## 2023-02-08 DIAGNOSIS — N52.9 ERECTILE DYSFUNCTION, UNSPECIFIED ERECTILE DYSFUNCTION TYPE: ICD-10-CM

## 2023-02-08 PROCEDURE — 99214 OFFICE O/P EST MOD 30 MIN: CPT | Performed by: INTERNAL MEDICINE

## 2023-02-08 RX ORDER — LISINOPRIL 20 MG/1
1 TABLET ORAL DAILY
COMMUNITY
Start: 2022-11-04 | End: 2023-03-20 | Stop reason: SDUPTHER

## 2023-02-08 RX ORDER — TRAZODONE HYDROCHLORIDE 50 MG/1
50 TABLET ORAL NIGHTLY
Qty: 90 TABLET | Refills: 1 | Status: SHIPPED | OUTPATIENT
Start: 2023-02-08 | End: 2023-03-17

## 2023-02-08 RX ORDER — SILDENAFIL 100 MG/1
100 TABLET, FILM COATED ORAL DAILY PRN
Qty: 30 TABLET | Refills: 11 | Status: SHIPPED | OUTPATIENT
Start: 2023-02-08

## 2023-02-08 NOTE — PROGRESS NOTES
Subjective     Jermaine Pichardo is a 53 y.o. male who presents with   Chief Complaint   Patient presents with   • WITHDRAW FROM OPIOIDS       History of Present Illness     He is doing much better overall.  Gabapentin has worked very well for withdrawal symptoms.   He is now trying slowly to wean off.  Sleeping has been a struggle but is slowly improving.  He is using trazodone.     He has been having some issues with ED.      Review of Systems   Respiratory: Negative.    Cardiovascular: Negative.        The following portions of the patient's history were reviewed and updated as appropriate: allergies, current medications and problem list.    Patient Active Problem List    Diagnosis Date Noted   • Hyperlipidemia 04/08/2022   • Depression with anxiety 01/08/2019   • DDD (degenerative disc disease), lumbar 09/14/2018   • Withdrawal from opioids (HCC) 07/27/2017   • Low vitamin B12 level 02/14/2017   • Asthma 02/11/2016     Note Last Updated: 2/11/2016     Impression: 10/27/2015 - This is not well controlled.  He is smoking again and knows that he needs to quit.  I have really encouraged him to do so.  I am prescribing him Singulair.  Impression: 04/29/2014 - Refill inhaler.;      • Essential hypertension 02/11/2016       Current Outpatient Medications on File Prior to Visit   Medication Sig Dispense Refill   • albuterol sulfate  (90 Base) MCG/ACT inhaler Inhale 1 puff Every 4 (Four) Hours As Needed for Wheezing. 18 g 8   • cloNIDine (Catapres) 0.1 MG tablet Take 1 tablet by mouth 2 (Two) Times a Day. 60 tablet 0   • Fluticasone Furoate-Vilanterol (Breo Ellipta) 200-25 MCG/INH inhaler Inhale 1 puff Daily. 1 each 11   • gabapentin (NEURONTIN) 100 MG capsule Take 2 capsules by mouth 3 (Three) Times a Day. 180 capsule 0   • levalbuterol (Xopenex HFA) 45 MCG/ACT inhaler Inhale 1-2 puffs Every 4 (Four) Hours As Needed for Wheezing. 15 g 11   • lisinopril (PRINIVIL,ZESTRIL) 20 MG tablet Take 1 tablet by mouth Daily.    "  • sertraline (ZOLOFT) 100 MG tablet TAKE 1 AND 1/2 TABLETS BY MOUTH EVERY  tablet 2   • tamsulosin (FLOMAX) 0.4 MG capsule 24 hr capsule TAKE 1 CAPSULE BY MOUTH EVERY DAY 90 capsule 1   • [DISCONTINUED] buprenorphine-naloxone (SUBOXONE) 8-2 MG per SL tablet      • [DISCONTINUED] traZODone (DESYREL) 50 MG tablet Take 1 tablet by mouth Every Night. 30 tablet 1   • [DISCONTINUED] lisinopril (PRINIVIL,ZESTRIL) 10 MG tablet Take 1 tablet by mouth Daily. (Patient taking differently: Take 20 mg by mouth Daily.) 90 tablet 3     No current facility-administered medications on file prior to visit.       Objective     /86   Pulse 101   Ht 193 cm (75.98\")   Wt 87.1 kg (192 lb)   SpO2 99%   BMI 23.38 kg/m²     Physical Exam  Constitutional:       Appearance: He is well-developed.   HENT:      Head: Atraumatic.   Pulmonary:      Effort: Pulmonary effort is normal.   Neurological:      Mental Status: He is alert and oriented to person, place, and time.         Assessment & Plan   Diagnoses and all orders for this visit:    1. Withdrawal from opioids (HCC) (Primary)    2. Colon cancer screening  -     Ambulatory Referral For Screening Colonoscopy    3. Erectile dysfunction, unspecified erectile dysfunction type  -     Testosterone, Free, Total  -     Sex Horm Binding Globulin  -     Comprehensive metabolic panel    Other orders  -     traZODone (DESYREL) 50 MG tablet; Take 1 tablet by mouth Every Night.  Dispense: 90 tablet; Refill: 1  -     sildenafil (Viagra) 100 MG tablet; Take 1 tablet by mouth Daily As Needed for Erectile Dysfunction.  Dispense: 30 tablet; Refill: 11        Discussion    F/u withdrawal from opioids.  He is doing overall well.  Continue wean of gabapentin.  Continue trazodone for sleep.  I would like him to stay off work until 2/27/2023 to continue his wean of gabapentin.  His sleeping will continue to slowly improve as well as the withdrawal symptoms subside.  It is my medical opinion that " he is released to return to work 2/27/2023.  ED.  Start work up with labs.  Trial of sildenafil. Discussed with the patient onset on action and the potential side effects including flushing.  The patient will let me know of any side effects from the medication.             Future Appointments   Date Time Provider Department Center   3/29/2023  8:50 AM [facility]   4/5/2023  9:00 AM Neena Sadler MD [facility]

## 2023-02-09 ENCOUNTER — TELEPHONE (OUTPATIENT)
Dept: INTERNAL MEDICINE | Facility: CLINIC | Age: 53
End: 2023-02-09
Payer: COMMERCIAL

## 2023-02-09 NOTE — TELEPHONE ENCOUNTER
Caller: Jermaine Pichardo    Relationship: Self    Best call back number: [phone]    Who are you requesting to speak with (clinical staff, provider,  specific staff member): CLINICAL STAFF     What was the call regarding: CALLING TO CHECK STATUS OF SHORT TERM DISABILITY PAPERWORK AND SEE IF ITS BEEN FAXED     Do you require a callback:YES

## 2023-02-14 LAB
ALBUMIN SERPL-MCNC: 4.7 G/DL (ref 3.5–5.2)
ALBUMIN/GLOB SERPL: 2.5 G/DL
ALP SERPL-CCNC: 56 U/L (ref 39–117)
ALT SERPL-CCNC: 30 U/L (ref 1–41)
AST SERPL-CCNC: 23 U/L (ref 1–40)
BILIRUB SERPL-MCNC: <0.2 MG/DL (ref 0–1.2)
BUN SERPL-MCNC: 21 MG/DL (ref 6–20)
BUN/CREAT SERPL: 30.4 (ref 7–25)
CALCIUM SERPL-MCNC: 9.8 MG/DL (ref 8.6–10.5)
CHLORIDE SERPL-SCNC: 107 MMOL/L (ref 98–107)
CO2 SERPL-SCNC: 28.4 MMOL/L (ref 22–29)
CREAT SERPL-MCNC: 0.69 MG/DL (ref 0.76–1.27)
EGFRCR SERPLBLD CKD-EPI 2021: 111.3 ML/MIN/1.73
GLOBULIN SER CALC-MCNC: 1.9 GM/DL
GLUCOSE SERPL-MCNC: 110 MG/DL (ref 65–99)
POTASSIUM SERPL-SCNC: 4.5 MMOL/L (ref 3.5–5.2)
PROT SERPL-MCNC: 6.6 G/DL (ref 6–8.5)
SHBG SERPL-SCNC: 31.7 NMOL/L (ref 19.3–76.4)
SODIUM SERPL-SCNC: 143 MMOL/L (ref 136–145)
TESTOST FREE SERPL-MCNC: 9.1 PG/ML (ref 7.2–24)
TESTOST SERPL-MCNC: 479 NG/DL (ref 264–916)

## 2023-02-15 ENCOUNTER — TELEPHONE (OUTPATIENT)
Dept: INTERNAL MEDICINE | Facility: CLINIC | Age: 53
End: 2023-02-15

## 2023-02-15 NOTE — TELEPHONE ENCOUNTER
Caller: RAMAN    Relationship: Other    Best call back number: [phone]    DISABILITY CLAIM #[id number]    What was the call regarding: PLEASE CALL TO GIVE MORE INFORMATION REGARDING THE PATIENT'S DISABILITY CLAIM AND WHAT KIND OF TREATMENT HE HAS BEEN IN SINCE HIS DETOX. ALSO NEED ADDITIONAL INFORMATION REGARDING WHY HE CAN NOT RETURN TO WORK.     IF NO ADDITIONAL INFO IS AVAILABLE, THEY WILL NEED TO DO A PEER TO PEER WITH DR ROQUE EITHER Friday OR Monday.     Do you require a callback: YES

## 2023-02-15 NOTE — TELEPHONE ENCOUNTER
Caller: Jermaine Pichardo    Relationship: Self    Best call back number:[phone]    Who are you requesting to speak with (clinical staff, provider,  specific staff member):CLINICAL     What was the call regarding:PATIENT WAS CALLING TO LET OFFICE KNOW HE IS HAVING HIS EMPLOYER FAX OVER SOME MENTAL [employer] DISABILITY PAPERWORK.     Do you require a callback:YES

## 2023-02-22 ENCOUNTER — TELEPHONE (OUTPATIENT)
Dept: INTERNAL MEDICINE | Facility: CLINIC | Age: 53
End: 2023-02-22
Payer: COMMERCIAL

## 2023-02-22 NOTE — TELEPHONE ENCOUNTER
Caller: Jermaine Pichardo    Relationship: Self    Best call back number: [phone]    What was the call regarding: PATIENT IS RETURNING TO WORK ON Monday, PLEASE ADVISE IF HE NEEDS TO BE SEEN BY DR ROQUE BEFORE HE DOES THAT. HE WASN'T SURE IF HIS SHORT TERM DISABILITY PAPERWORK REQUIRED THAT.     Do you require a callback: YES

## 2023-02-22 NOTE — TELEPHONE ENCOUNTER
I talked to a doctor yesterday that called from disability.  They seemed all good with what I said.  I don't think he needs to see me again if he is good to go.  MICHAEL

## 2023-02-26 DIAGNOSIS — F11.93 WITHDRAWAL FROM OPIOIDS: ICD-10-CM

## 2023-02-27 DIAGNOSIS — F11.93 WITHDRAWAL FROM OPIOIDS: ICD-10-CM

## 2023-02-27 RX ORDER — GABAPENTIN 100 MG/1
200 CAPSULE ORAL 3 TIMES DAILY
Qty: 180 CAPSULE | Refills: 0 | Status: SHIPPED | OUTPATIENT
Start: 2023-02-27 | End: 2023-03-24

## 2023-02-27 RX ORDER — GABAPENTIN 100 MG/1
200 CAPSULE ORAL 3 TIMES DAILY
Qty: 180 CAPSULE | Refills: 0 | OUTPATIENT
Start: 2023-02-27

## 2023-03-01 ENCOUNTER — TELEPHONE (OUTPATIENT)
Dept: INTERNAL MEDICINE | Facility: CLINIC | Age: 53
End: 2023-03-01

## 2023-03-01 ENCOUNTER — TELEMEDICINE (OUTPATIENT)
Dept: INTERNAL MEDICINE | Facility: CLINIC | Age: 53
End: 2023-03-01
Payer: COMMERCIAL

## 2023-03-01 DIAGNOSIS — G47.00 INSOMNIA, UNSPECIFIED TYPE: ICD-10-CM

## 2023-03-01 DIAGNOSIS — F41.8 DEPRESSION WITH ANXIETY: Primary | ICD-10-CM

## 2023-03-01 PROCEDURE — 99213 OFFICE O/P EST LOW 20 MIN: CPT | Performed by: INTERNAL MEDICINE

## 2023-03-01 RX ORDER — SERTRALINE HYDROCHLORIDE 100 MG/1
200 TABLET, FILM COATED ORAL DAILY
Qty: 180 TABLET | Refills: 3 | Status: SHIPPED | OUTPATIENT
Start: 2023-03-01 | End: 2023-03-30

## 2023-03-01 NOTE — PROGRESS NOTES
Subjective     Jermaine Pichardo is a 53 y.o. male who presents with   Chief Complaint   Patient presents with   • Depression   • Anxiety   • Insomnia       History of Present Illness     You have chosen to receive care through a telehealth visit.  Do you consent to use a video/audio connection for your medical care today? Yes  Provider is located in [place].  The patient is located in [place].      Depression with anxiety.  He has been on Zoloft 150mg for years.  He does not feel as well controlled recently.    Insomnia.  Trazodone makes him groggy in the morning but he does not sleep without it.   Started with cold symptoms today.     Review of Systems   HENT: Positive for congestion and rhinorrhea.        The following portions of the patient's history were reviewed and updated as appropriate: allergies, current medications and problem list.    Patient Active Problem List    Diagnosis Date Noted   • Hyperlipidemia 04/08/2022   • Depression with anxiety 01/08/2019   • DDD (degenerative disc disease), lumbar 09/14/2018   • Withdrawal from opioids (HCC) 07/27/2017   • Low vitamin B12 level 02/14/2017   • Asthma 02/11/2016     Note Last Updated: 2/11/2016     Impression: 10/27/2015 - This is not well controlled.  He is smoking again and knows that he needs to quit.  I have really encouraged him to do so.  I am prescribing him Singulair.  Impression: 04/29/2014 - Refill inhaler.;      • Essential hypertension 02/11/2016       Current Outpatient Medications on File Prior to Visit   Medication Sig Dispense Refill   • albuterol sulfate  (90 Base) MCG/ACT inhaler Inhale 1 puff Every 4 (Four) Hours As Needed for Wheezing. 18 g 8   • Fluticasone Furoate-Vilanterol (Breo Ellipta) 200-25 MCG/INH inhaler Inhale 1 puff Daily. 1 each 11   • gabapentin (NEURONTIN) 100 MG capsule TAKE 2 CAPSULES BY MOUTH 3 (THREE) TIMES A DAY. 180 capsule 0   • levalbuterol (Xopenex HFA) 45 MCG/ACT inhaler Inhale 1-2 puffs Every 4 (Four) Hours As  Needed for Wheezing. 15 g 11   • lisinopril (PRINIVIL,ZESTRIL) 20 MG tablet Take 1 tablet by mouth Daily.     • sildenafil (Viagra) 100 MG tablet Take 1 tablet by mouth Daily As Needed for Erectile Dysfunction. 30 tablet 11   • traZODone (DESYREL) 50 MG tablet Take 1 tablet by mouth Every Night. 90 tablet 1   • [DISCONTINUED] cloNIDine (Catapres) 0.1 MG tablet Take 1 tablet by mouth 2 (Two) Times a Day. 60 tablet 0   • [DISCONTINUED] sertraline (ZOLOFT) 100 MG tablet TAKE 1 AND 1/2 TABLETS BY MOUTH EVERY  tablet 2   • [DISCONTINUED] tamsulosin (FLOMAX) 0.4 MG capsule 24 hr capsule TAKE 1 CAPSULE BY MOUTH EVERY DAY 90 capsule 1     No current facility-administered medications on file prior to visit.       Objective     There were no vitals taken for this visit.    Physical Exam  Constitutional:       Appearance: He is well-developed.   HENT:      Head: Atraumatic.   Pulmonary:      Effort: Pulmonary effort is normal.   Neurological:      Mental Status: He is alert and oriented to person, place, and time.         Assessment & Plan   Diagnoses and all orders for this visit:    1. Insomnia, unspecified type (Primary)    2. Depression with anxiety    Other orders  -     sertraline (ZOLOFT) 100 MG tablet; Take 2 tablets by mouth Daily.  Dispense: 180 tablet; Refill: 3        Discussion    Depression with anxiety.  Trial of increasing Zoloft to 200 mg daily.    Insomnia.  He can try taking 1/2 of the trazodone and see if that is effective for sleep without leaving him groggy in the morning.         Future Appointments   Date Time Provider Department Center   3/29/2023  8:50 AM [facility]   4/5/2023  9:00 AM Neena Sadler MD [facility]

## 2023-03-01 NOTE — TELEPHONE ENCOUNTER
Caller: Jermaine Pichardo    Relationship: Self    Best call back number: [phone]    What was the call regarding: PATIENT STATES HE HAS BEEN HAVING SOME DEPRESSION FOR THE LAST WEEK AND HE WOULD LIKE FOR SOMEONE TO GIVE HIM A CALL TO LET HIM KNOW WHAT HE SHOULD DO ABOUT THIS. PATIENT ALSO STATES HE HAS BEEN TAKING ZOLOFT FOR 24 YEARS AND HE IS NOT SURE IF THE MEDICATION IS CAUSING IT OR IF THE MEDICATION IS NEEDING A SUPPLEMENT.    PLEASE CALL AND ADVISE     Do you require a callback: YES

## 2023-03-17 ENCOUNTER — OFFICE VISIT (OUTPATIENT)
Dept: INTERNAL MEDICINE | Facility: CLINIC | Age: 53
End: 2023-03-17
Payer: COMMERCIAL

## 2023-03-17 VITALS
HEART RATE: 73 BPM | SYSTOLIC BLOOD PRESSURE: 152 MMHG | WEIGHT: 192 LBS | HEIGHT: 76 IN | DIASTOLIC BLOOD PRESSURE: 100 MMHG | OXYGEN SATURATION: 98 % | BODY MASS INDEX: 23.38 KG/M2

## 2023-03-17 DIAGNOSIS — G47.00 INSOMNIA, UNSPECIFIED TYPE: Primary | ICD-10-CM

## 2023-03-17 DIAGNOSIS — F11.93 WITHDRAWAL FROM OPIOIDS: ICD-10-CM

## 2023-03-17 PROCEDURE — 99213 OFFICE O/P EST LOW 20 MIN: CPT | Performed by: INTERNAL MEDICINE

## 2023-03-17 NOTE — PROGRESS NOTES
Subjective     Jermaine Pichardo is a 53 y.o. male who presents with   Chief Complaint   Patient presents with   • Withdrawl from Opioids   • Insomnia       History of Present Illness     W/u opiod withdrawal.  He is overall doing well on Zoloft and gabapentin.  Trazodone has not been effective for sleep without giving him morning sedation.  He does not want to take.  He has a melatonin preparation he is going to try.  He is getting only 2-3 hour so sleep nightly.  We discussed two other drugs options that are not habit forming.  Belsomra and Doxepin.     Review of Systems   Respiratory: Negative.    Cardiovascular: Negative.        The following portions of the patient's history were reviewed and updated as appropriate: allergies, current medications and problem list.    Patient Active Problem List    Diagnosis Date Noted   • Hyperlipidemia 04/08/2022   • Depression with anxiety 01/08/2019   • DDD (degenerative disc disease), lumbar 09/14/2018   • Withdrawal from opioids (HCC) 07/27/2017   • Low vitamin B12 level 02/14/2017   • Asthma 02/11/2016     Note Last Updated: 2/11/2016     Impression: 10/27/2015 - This is not well controlled.  He is smoking again and knows that he needs to quit.  I have really encouraged him to do so.  I am prescribing him Singulair.  Impression: 04/29/2014 - Refill inhaler.;      • Essential hypertension 02/11/2016   • Other insomnia 02/11/2016       Current Outpatient Medications on File Prior to Visit   Medication Sig Dispense Refill   • albuterol sulfate  (90 Base) MCG/ACT inhaler Inhale 1 puff Every 4 (Four) Hours As Needed for Wheezing. 18 g 8   • Fluticasone Furoate-Vilanterol (Breo Ellipta) 200-25 MCG/INH inhaler Inhale 1 puff Daily. 1 each 11   • gabapentin (NEURONTIN) 100 MG capsule TAKE 2 CAPSULES BY MOUTH 3 (THREE) TIMES A DAY. 180 capsule 0   • levalbuterol (Xopenex HFA) 45 MCG/ACT inhaler Inhale 1-2 puffs Every 4 (Four) Hours As Needed for Wheezing. 15 g 11   • lisinopril  "(PRINIVIL,ZESTRIL) 20 MG tablet Take 1 tablet by mouth Daily.     • sertraline (ZOLOFT) 100 MG tablet Take 2 tablets by mouth Daily. 180 tablet 3   • sildenafil (Viagra) 100 MG tablet Take 1 tablet by mouth Daily As Needed for Erectile Dysfunction. 30 tablet 11   • [DISCONTINUED] traZODone (DESYREL) 50 MG tablet Take 1 tablet by mouth Every Night. 90 tablet 1     No current facility-administered medications on file prior to visit.       Objective     /100   Pulse 73   Ht 193 cm (75.98\")   Wt 87.1 kg (192 lb)   SpO2 98%   BMI 23.38 kg/m²     Physical Exam  Constitutional:       Appearance: He is well-developed.   HENT:      Head: Atraumatic.   Pulmonary:      Effort: Pulmonary effort is normal.   Neurological:      Mental Status: He is alert and oriented to person, place, and time.         Assessment & Plan   Diagnoses and all orders for this visit:    1. Insomnia, unspecified type (Primary)    2. Withdrawal from opioids (HCC)        Discussion    F/u withdrawal from opiods and insomnia.  Continue Zoloft and gabapentin.  Insomnia remains the main issue.   He cannot return to his work sleeping only 2-3 hours a night.  He is going to try melatonin.  He wants to avoid any sedative hyponotic medications that are scheduled and potentially addictive.  Offered Belsomra or doxepin.  He will discuss with wife.  Anticipated return to work date is 4/3/2023.         Future Appointments   Date Time Provider Department Center   3/29/2023  8:50 AM [facility]   4/5/2023  9:00 AM Neena Sadler MD [facility]         "

## 2023-03-20 ENCOUNTER — TELEPHONE (OUTPATIENT)
Dept: INTERNAL MEDICINE | Facility: CLINIC | Age: 53
End: 2023-03-20
Payer: COMMERCIAL

## 2023-03-20 RX ORDER — DOXEPIN HYDROCHLORIDE 10 MG/1
10 CAPSULE ORAL NIGHTLY
Qty: 30 CAPSULE | Refills: 5 | Status: SHIPPED | OUTPATIENT
Start: 2023-03-20 | End: 2023-04-04

## 2023-03-20 RX ORDER — LISINOPRIL 20 MG/1
20 TABLET ORAL DAILY
Qty: 90 TABLET | Refills: 1 | Status: SHIPPED | OUTPATIENT
Start: 2023-03-20

## 2023-03-20 NOTE — TELEPHONE ENCOUNTER
Caller: Jermaine Pichardo    Relationship: Self    Best call back number: [phone]    Requested Prescriptions:   Requested Prescriptions     Pending Prescriptions Disp Refills   • lisinopril (PRINIVIL,ZESTRIL) 20 MG tablet       Sig: Take 1 tablet by mouth Daily.        Pharmacy where request should be sent: [id number] - [place], [place] - [address] AT [facility] - [phone]  - [phone]      Additional details provided by patient: OUT    Does the patient have less than a 3 day supply:  [x] Yes  [] No    Would you like a call back once the refill request has been completed: [] Yes [x] No    If the office needs to give you a call back, can they leave a voicemail: [] Yes [x] No    Carlitos Clifton   03/20/23 10:06 EDT

## 2023-03-20 NOTE — TELEPHONE ENCOUNTER
Caller: Jermaine Pichardo    Relationship: Self    Best call back number: [phone]    What medication are you requesting: DOXEPIN    What are your current symptoms: INSOMINA    How long have you been experiencing symptoms: 4 MONTH    Have you had these symptoms before:    [x] Yes  [] No    Have you been treated for these symptoms before:   [x] Yes  [] No    If a prescription is needed, what is your preferred pharmacy and phone number:    [id number] - [place], [place] - [address] AT [facility] - [phone] [facility] [phone] FX      Additional notes:TRAZODONE IS NOT WORKING

## 2023-03-24 DIAGNOSIS — F11.93 WITHDRAWAL FROM OPIOIDS: ICD-10-CM

## 2023-03-24 RX ORDER — GABAPENTIN 100 MG/1
200 CAPSULE ORAL 3 TIMES DAILY
Qty: 180 CAPSULE | Refills: 0 | Status: SHIPPED | OUTPATIENT
Start: 2023-03-24 | End: 2023-04-07 | Stop reason: SDUPTHER

## 2023-03-29 DIAGNOSIS — Z00.00 ANNUAL PHYSICAL EXAM: Primary | ICD-10-CM

## 2023-03-29 DIAGNOSIS — E78.5 HYPERLIPIDEMIA, UNSPECIFIED HYPERLIPIDEMIA TYPE: ICD-10-CM

## 2023-03-29 DIAGNOSIS — I10 ESSENTIAL HYPERTENSION: ICD-10-CM

## 2023-03-30 LAB
ALBUMIN SERPL-MCNC: 4.6 G/DL (ref 3.5–5.2)
ALBUMIN/CREAT UR: 5 MG/G CREAT (ref 0–29)
ALBUMIN/GLOB SERPL: 1.8 G/DL
ALP SERPL-CCNC: 72 U/L (ref 39–117)
ALT SERPL-CCNC: 34 U/L (ref 1–41)
APPEARANCE UR: CLEAR
AST SERPL-CCNC: 27 U/L (ref 1–40)
BACTERIA #/AREA URNS HPF: ABNORMAL /HPF
BASOPHILS # BLD AUTO: 0.08 10*3/MM3 (ref 0–0.2)
BASOPHILS NFR BLD AUTO: 1.1 % (ref 0–1.5)
BILIRUB SERPL-MCNC: 0.2 MG/DL (ref 0–1.2)
BILIRUB UR QL STRIP: NEGATIVE
BUN SERPL-MCNC: 14 MG/DL (ref 6–20)
BUN/CREAT SERPL: 16.9 (ref 7–25)
CALCIUM SERPL-MCNC: 10.2 MG/DL (ref 8.6–10.5)
CASTS URNS QL MICRO: ABNORMAL /LPF
CHLORIDE SERPL-SCNC: 98 MMOL/L (ref 98–107)
CHOLEST SERPL-MCNC: 256 MG/DL (ref 0–200)
CO2 SERPL-SCNC: 30 MMOL/L (ref 22–29)
COLOR UR: YELLOW
CREAT SERPL-MCNC: 0.83 MG/DL (ref 0.76–1.27)
CREAT UR-MCNC: 274.1 MG/DL
CRYSTALS URNS MICRO: ABNORMAL
EGFRCR SERPLBLD CKD-EPI 2021: 105.3 ML/MIN/1.73
EOSINOPHIL # BLD AUTO: 0.62 10*3/MM3 (ref 0–0.4)
EOSINOPHIL NFR BLD AUTO: 8.7 % (ref 0.3–6.2)
EPI CELLS #/AREA URNS HPF: ABNORMAL /HPF (ref 0–10)
ERYTHROCYTE [DISTWIDTH] IN BLOOD BY AUTOMATED COUNT: 11.7 % (ref 12.3–15.4)
GLOBULIN SER CALC-MCNC: 2.5 GM/DL
GLUCOSE SERPL-MCNC: 99 MG/DL (ref 65–99)
GLUCOSE UR QL STRIP: NEGATIVE
HBA1C MFR BLD: 5 % (ref 4.8–5.6)
HCT VFR BLD AUTO: 37.5 % (ref 37.5–51)
HDLC SERPL-MCNC: 54 MG/DL (ref 40–60)
HGB BLD-MCNC: 12.7 G/DL (ref 13–17.7)
HGB UR QL STRIP: NEGATIVE
IMM GRANULOCYTES # BLD AUTO: 0.01 10*3/MM3 (ref 0–0.05)
IMM GRANULOCYTES NFR BLD AUTO: 0.1 % (ref 0–0.5)
KETONES UR QL STRIP: ABNORMAL
LDLC SERPL CALC-MCNC: 165 MG/DL (ref 0–100)
LEUKOCYTE ESTERASE UR QL STRIP: NEGATIVE
LYMPHOCYTES # BLD AUTO: 1.85 10*3/MM3 (ref 0.7–3.1)
LYMPHOCYTES NFR BLD AUTO: 26 % (ref 19.6–45.3)
MCH RBC QN AUTO: 32.1 PG (ref 26.6–33)
MCHC RBC AUTO-ENTMCNC: 33.9 G/DL (ref 31.5–35.7)
MCV RBC AUTO: 94.7 FL (ref 79–97)
MICRO URNS: ABNORMAL
MICRO URNS: ABNORMAL
MICROALBUMIN UR-MCNC: 13.7 UG/ML
MONOCYTES # BLD AUTO: 0.85 10*3/MM3 (ref 0.1–0.9)
MONOCYTES NFR BLD AUTO: 11.9 % (ref 5–12)
MUCOUS THREADS URNS QL MICRO: PRESENT /HPF
NEUTROPHILS # BLD AUTO: 3.71 10*3/MM3 (ref 1.7–7)
NEUTROPHILS NFR BLD AUTO: 52.2 % (ref 42.7–76)
NITRITE UR QL STRIP: NEGATIVE
NRBC BLD AUTO-RTO: 0 /100 WBC (ref 0–0.2)
PH UR STRIP: 5.5 [PH] (ref 5–7.5)
PLATELET # BLD AUTO: 339 10*3/MM3 (ref 140–450)
POTASSIUM SERPL-SCNC: 4.3 MMOL/L (ref 3.5–5.2)
PROT SERPL-MCNC: 7.1 G/DL (ref 6–8.5)
PROT UR QL STRIP: ABNORMAL
RBC # BLD AUTO: 3.96 10*6/MM3 (ref 4.14–5.8)
RBC #/AREA URNS HPF: ABNORMAL /HPF (ref 0–2)
SODIUM SERPL-SCNC: 139 MMOL/L (ref 136–145)
SP GR UR STRIP: 1.02 (ref 1–1.03)
TRIGL SERPL-MCNC: 200 MG/DL (ref 0–150)
TSH SERPL DL<=0.005 MIU/L-ACNC: 1.19 UIU/ML (ref 0.27–4.2)
UNIDENT CRYS URNS QL MICRO: PRESENT /LPF
URINALYSIS REFLEX: ABNORMAL
UROBILINOGEN UR STRIP-MCNC: 0.2 MG/DL (ref 0.2–1)
VLDLC SERPL CALC-MCNC: 37 MG/DL (ref 5–40)
WBC # BLD AUTO: 7.12 10*3/MM3 (ref 3.4–10.8)
WBC #/AREA URNS HPF: ABNORMAL /HPF (ref 0–5)

## 2023-03-30 RX ORDER — SERTRALINE HYDROCHLORIDE 100 MG/1
TABLET, FILM COATED ORAL
Qty: 135 TABLET | Refills: 2 | Status: SHIPPED | OUTPATIENT
Start: 2023-03-30

## 2023-04-04 ENCOUNTER — OFFICE VISIT (OUTPATIENT)
Dept: INTERNAL MEDICINE | Facility: CLINIC | Age: 53
End: 2023-04-04
Payer: COMMERCIAL

## 2023-04-04 VITALS
HEART RATE: 74 BPM | HEIGHT: 76 IN | BODY MASS INDEX: 23.99 KG/M2 | DIASTOLIC BLOOD PRESSURE: 84 MMHG | SYSTOLIC BLOOD PRESSURE: 134 MMHG | WEIGHT: 197 LBS | OXYGEN SATURATION: 98 %

## 2023-04-04 DIAGNOSIS — Z00.00 WELL ADULT EXAM: Primary | ICD-10-CM

## 2023-04-04 DIAGNOSIS — H60.93 OTITIS EXTERNA OF BOTH EARS, UNSPECIFIED CHRONICITY, UNSPECIFIED TYPE: ICD-10-CM

## 2023-04-04 DIAGNOSIS — M54.50 BILATERAL LOW BACK PAIN WITHOUT SCIATICA, UNSPECIFIED CHRONICITY: ICD-10-CM

## 2023-04-04 DIAGNOSIS — Z12.5 ENCOUNTER FOR PROSTATE CANCER SCREENING: ICD-10-CM

## 2023-04-04 RX ORDER — CIPROFLOXACIN AND DEXAMETHASONE 3; 1 MG/ML; MG/ML
4 SUSPENSION/ DROPS AURICULAR (OTIC) 2 TIMES DAILY
Qty: 7.5 ML | Refills: 0 | Status: SHIPPED | OUTPATIENT
Start: 2023-04-04

## 2023-04-04 NOTE — PROGRESS NOTES
Subjective     Jermaine Pichardo is a 53 y.o. male who presents for a complete physical exam.      History of Present Illness     The following data was reviewed by: Neena Sadler MD on 04/04/2023:  Common labs    Common Labs 2/8/23 3/29/23 3/29/23 3/29/23 3/29/23 3/29/23     0848 0848 0848 0848 0848   Glucose 110 (A)  99      BUN 21 (A)  14      Creatinine 0.69 (A)  0.83      Sodium 143  139      Potassium 4.5  4.3      Chloride 107  98      Calcium 9.8  10.2      Total Protein 6.6  7.1      Albumin 4.7  4.6      Total Bilirubin <0.2  0.2      Alkaline Phosphatase 56  72      AST (SGOT) 23  27      ALT (SGPT) 30  34      WBC  7.12       Hemoglobin  12.7 (A)       Hematocrit  37.5       Platelets  339       Total Cholesterol    256 (A)     Triglycerides    200 (A)     HDL Cholesterol    54     LDL Cholesterol     165 (A)     Hemoglobin A1C     5.00    Microalbumin, Urine      13.7   (A) Abnormal value       Comments are available for some flowsheets but are not being displayed.               C/o LBP.  Started nine days ago when ago when at home.  Bilateral low back.  No radiation.  No tingling.  No numbness.  Gabapentin has been helpful.  He has been taking up to 1200mg daily.      C/o irritation in ears.  Going on months.  Itching and draining is associated.      Review of Systems   Constitutional: Negative.    HENT: Negative.    Eyes: Negative.    Respiratory: Negative.    Cardiovascular: Negative.    Gastrointestinal: Negative.    Endocrine: Negative.    Genitourinary: Negative.    Musculoskeletal: Positive for back pain.   Skin: Negative.    Allergic/Immunologic: Negative.    Neurological: Negative.    Hematological: Negative.    Psychiatric/Behavioral: Negative.        The following portions of the patient's history were reviewed and updated as appropriate: allergies, current medications, past family history, past medical history, past social history, past surgical history and problem list.  Health maintenance  tab was reviewed and updated with the patient.       Patient Active Problem List    Diagnosis Date Noted   • Hyperlipidemia 2022   • Depression with anxiety 2019   • DDD (degenerative disc disease), lumbar 2018   • Withdrawal from opioids 2017   • Low vitamin B12 level 2017   • Asthma 2016     Note Last Updated: 2016     Impression: 10/27/2015 - This is not well controlled.  He is smoking again and knows that he needs to quit.  I have really encouraged him to do so.  I am prescribing him Singulair.  Impression: 2014 - Refill inhaler.;      • Essential hypertension 2016   • Other insomnia 2016       Past Medical History:   Diagnosis Date   • Actinic keratosis    • Anxiety    • Asthma        Past Surgical History:   Procedure Laterality Date   • MANDIBLE SURGERY         Family History   Problem Relation Age of Onset   • Diabetes Mother    • Colon cancer Paternal Grandfather        Social History     Socioeconomic History   • Marital status:    • Number of children: 3   Tobacco Use   • Smoking status: Former     Packs/day: 1.00     Years: 17.00     Pack years: 17.00     Types: Cigarettes     Quit date: 2023     Years since quittin.2   • Smokeless tobacco: Never   • Tobacco comments:     started at age 41   Substance and Sexual Activity   • Alcohol use: Not Currently     Comment: 2-3/day   • Drug use: No       Current Outpatient Medications on File Prior to Visit   Medication Sig Dispense Refill   • albuterol sulfate  (90 Base) MCG/ACT inhaler Inhale 1 puff Every 4 (Four) Hours As Needed for Wheezing. 18 g 8   • gabapentin (NEURONTIN) 100 MG capsule TAKE 2 CAPSULES BY MOUTH 3 (THREE) TIMES A DAY. 180 capsule 0   • lisinopril (PRINIVIL,ZESTRIL) 20 MG tablet Take 1 tablet by mouth Daily. 90 tablet 1   • sertraline (ZOLOFT) 100 MG tablet TAKE 1 AND 1/2 TABLETS BY MOUTH EVERY  tablet 2   • sildenafil (Viagra) 100 MG tablet Take 1 tablet  "by mouth Daily As Needed for Erectile Dysfunction. 30 tablet 11   • [DISCONTINUED] Fluticasone Furoate-Vilanterol (Breo Ellipta) 200-25 MCG/INH inhaler Inhale 1 puff Daily. 1 each 11   • [DISCONTINUED] levalbuterol (Xopenex HFA) 45 MCG/ACT inhaler Inhale 1-2 puffs Every 4 (Four) Hours As Needed for Wheezing. 15 g 11   • [DISCONTINUED] doxepin (SINEquan) 10 MG capsule Take 1 capsule by mouth Every Night. 30 capsule 5     No current facility-administered medications on file prior to visit.       No Known Allergies    Immunization History   Administered Date(s) Administered   • COVID-19 (PFIZER) PURPLE CAP 03/20/2021, 04/10/2021, 12/15/2021   • Pneumococcal Polysaccharide (PPSV23) 04/27/2017   • Td 03/01/2009   • Tdap 01/08/2019   • flucelvax quad pfs =>4 YRS 01/08/2019       Objective     /84   Pulse 74   Ht 193 cm (75.98\")   Wt 89.4 kg (197 lb)   SpO2 98%   BMI 23.99 kg/m²     Physical Exam  Constitutional:       Appearance: He is well-developed.   HENT:      Head: Normocephalic and atraumatic.      Right Ear: Hearing and tympanic membrane normal.      Left Ear: Hearing and tympanic membrane normal.      Ears:      Comments: Flaking skin in bilateral EAC     Mouth/Throat:      Pharynx: No posterior oropharyngeal erythema.   Neck:      Thyroid: No thyromegaly.   Cardiovascular:      Rate and Rhythm: Normal rate and regular rhythm.      Heart sounds: Normal heart sounds. No murmur heard.  Pulmonary:      Effort: Pulmonary effort is normal.      Breath sounds: Normal breath sounds.   Abdominal:      General: There is no distension.      Palpations: Abdomen is soft.      Tenderness: There is no abdominal tenderness.   Genitourinary:     Penis: Normal.       Testes: Normal.      Prostate: Normal.      Rectum: Normal.   Musculoskeletal:      Cervical back: Neck supple.   Lymphadenopathy:      Cervical: No cervical adenopathy.   Skin:     General: Skin is warm and dry.   Neurological:      Mental Status: He is " alert and oriented to person, place, and time.   Psychiatric:         Speech: Speech normal.         Behavior: Behavior normal.         Thought Content: Thought content normal.         Judgment: Judgment normal.     X-rays of the back  performed today for following indication:   pain.  X-ray reveal DDD.  There is no available x-ray for comparison.  X-ray sent to radiology for official interpretation and findings.        Assessment & Plan   Diagnoses and all orders for this visit:    1. Well adult exam (Primary)    2. Bilateral low back pain without sciatica, unspecified chronicity  -     XR Spine Lumbar Complete 4+VW  -     Ambulatory Referral to Physical Therapy Evaluate and treat    3. Encounter for prostate cancer screening  -     PSA SCREENING    4. Otitis externa of both ears, unspecified chronicity, unspecified type    Other orders  -     ciprofloxacin-dexamethasone (Ciprodex) 0.3-0.1 % otic suspension; Administer 4 drops into both ears 2 (Two) Times a Day.  Dispense: 7.5 mL; Refill: 0        Discussion    Patient presents today for a CPE.      Patient follows a healthy diet.   Patient follows an adequate exercise regimen. Prostate cancer screening is up to date.  Patient has been referred for colon cancer screening.   I have recommended that the patient get the following immunizations:  Shingrix, Pneumovax and COVID-19.  Acute LBP.  I discussed with the patient a trial of conservative management with:   physical therapy and gabapentin.  Let me know if not feeling better over the next several weeks or if there is any change in symptoms.  Otitis externa.  Trial of Ciprodex drops.  Let me know if that is not helpful.            Health Maintenance   Topic Date Due   • COLORECTAL CANCER SCREENING  Never done   • Pneumococcal Vaccine 0-64 (2 - PCV) 04/27/2018   • ZOSTER VACCINE (1 of 2) Never done   • COVID-19 Vaccine (4 - Booster for Pfizer series) 02/09/2022   • ANNUAL PHYSICAL  03/29/2023   • INFLUENZA VACCINE   08/01/2023   • LIPID PANEL  03/29/2024   • TDAP/TD VACCINES (3 - Td or Tdap) 01/08/2029   • HEPATITIS C SCREENING  Completed            Future Appointments   Date Time Provider Department Center   7/12/2023  8:30 AM [facility]   4/5/2024  8:20 AM [facility]   4/12/2024  8:15 AM Neena Sadler MD [facility]

## 2023-04-05 LAB — PSA SERPL-MCNC: 0.8 NG/ML (ref 0–4)

## 2023-04-07 ENCOUNTER — TELEPHONE (OUTPATIENT)
Dept: INTERNAL MEDICINE | Facility: CLINIC | Age: 53
End: 2023-04-07

## 2023-04-07 DIAGNOSIS — F11.93 WITHDRAWAL FROM OPIOIDS: ICD-10-CM

## 2023-04-07 RX ORDER — GABAPENTIN 100 MG/1
400 CAPSULE ORAL 3 TIMES DAILY
Qty: 360 CAPSULE | Refills: 0 | Status: SHIPPED | OUTPATIENT
Start: 2023-04-07

## 2023-04-07 NOTE — TELEPHONE ENCOUNTER
Caller: Jermaine Pichardo    Relationship: Self    Best call back number:    [phone]        Requested Prescriptions:   Requested Prescriptions     Pending Prescriptions Disp Refills   • gabapentin (NEURONTIN) 100 MG capsule 180 capsule 0     Sig: Take 2 capsules by mouth 3 (Three) Times a Day.        Pharmacy where request should be sent: [facility]/PHARMACY #[id number] - [place], [place] - [address] AT [facility] - [phone] [facility] [phone] FX     Last office visit with prescribing clinician: 4/4/2023   Last telemedicine visit with prescribing clinician: 7/12/2023   Next office visit with prescribing clinician: 4/12/2024     Additional details provided by patient:       PATIENT IS WANTING TO KNOW IF YOU CAN INCREASE HIS DOSAGE         Does the patient have less than a 3 day supply:  [x] Yes  [] No    Would you like a call back once the refill request has been completed: [] Yes [] No    If the office needs to give you a call back, can they leave a voicemail: [] Yes [] No    Erik Mckinney Rep   04/07/23 15:38 EDT

## 2023-04-07 NOTE — TELEPHONE ENCOUNTER
Caller: Jermaine Pichardo    Relationship: Self    Best call back number:[phone]    Who are you requesting to speak with (clinical staff, provider,  specific staff member): CLINICAL    What was the call regarding: PATIENT WOULD LIKE A CALL REGARDING EXTENDING HIS SHORT TERM DISABILITY.     Do you require a callback:YES

## 2023-04-07 NOTE — TELEPHONE ENCOUNTER
Call patient and get details of what is going on.      Why taking FLMA?  Same reasons?  When expect to return to work?  Does he need to be seen again?  Does he want intermittent.  MICHAEL

## 2023-04-10 ENCOUNTER — TELEPHONE (OUTPATIENT)
Dept: INTERNAL MEDICINE | Facility: CLINIC | Age: 53
End: 2023-04-10
Payer: COMMERCIAL

## 2023-04-10 DIAGNOSIS — F11.93 WITHDRAWAL FROM OPIOIDS: ICD-10-CM

## 2023-04-10 RX ORDER — GABAPENTIN 100 MG/1
400 CAPSULE ORAL 3 TIMES DAILY
Qty: 360 CAPSULE | Refills: 0 | Status: CANCELLED | OUTPATIENT
Start: 2023-04-10

## 2023-04-10 NOTE — TELEPHONE ENCOUNTER
Pharmacy Name: [facility]/PHARMACY #[id number] - [place], [place] - [address] AT [facility] - [phone]  - [phone]      Pharmacy representative name: JACKELINE    Pharmacy representative phone number: [phone]    What medication are you calling in regards to: gabapentin (NEURONTIN) 100 MG capsule    What question does the pharmacy have: WANTING TO KNOW WHEN ITS OK TO FILL MEDICATION SOONEST IT CAN BE FILLED IS 4/16/23 WITH NEW INCREASE OF DOSAGE     Who is the provider that prescribed the medication: DR. ROQUE

## 2023-04-10 NOTE — TELEPHONE ENCOUNTER
Caller: Jermaine Pichardo    Relationship: Self    Best call back number: [phone]    Requested Prescriptions:   Requested Prescriptions     Pending Prescriptions Disp Refills   • gabapentin (NEURONTIN) 100 MG capsule 360 capsule 0     Sig: Take 4 capsules by mouth 3 (Three) Times a Day.        Pharmacy where request should be sent: [facility]/PHARMACY #[id number] - [place], [place] - [address] AT [facility] - [phone] [facility] [phone] FX     Last office visit with prescribing clinician: 4/4/2023   Last telemedicine visit with prescribing clinician: 7/12/2023   Next office visit with prescribing clinician: 4/12/2024     Additional details provided by patient: PATIENT IS OUT OF MEDICATION. PATIENT STATS DR ROQUE CHANGED THE DOSAGE ON THE MEDICATION SO THE PHARMACY IS NEEDING ANOTHER REFILL REQUEST     Does the patient have less than a 3 day supply:  [x] Yes  [] No    Would you like a call back once the refill request has been completed: [] Yes [x] No    If the office needs to give you a call back, can they leave a voicemail: [] Yes [x] No    Erik Crawford Rep   04/10/23 15:36 EDT

## 2023-04-10 NOTE — TELEPHONE ENCOUNTER
Caller: Jermaine Pichardo    Relationship: Self    Best call back number: [phone]    What was the call regarding: PATIENT STATES THAT [facility] RECEIVED THE PRESCRIPTION REFILL FROM DR ROQUE ON 4/7 BUT THEY WILL NOT FILL IT UNTIL THEY HEAR FROM DR ROQUE GIVING APPROVAL. PLEASE ADVISE.     Do you require a callback: YES

## 2023-04-12 ENCOUNTER — TELEMEDICINE (OUTPATIENT)
Dept: INTERNAL MEDICINE | Facility: CLINIC | Age: 53
End: 2023-04-12
Payer: COMMERCIAL

## 2023-04-12 DIAGNOSIS — F11.93 WITHDRAWAL FROM OPIOIDS: ICD-10-CM

## 2023-04-12 DIAGNOSIS — F41.8 DEPRESSION WITH ANXIETY: ICD-10-CM

## 2023-04-12 DIAGNOSIS — F19.21 DRUG ADDICTION IN REMISSION: ICD-10-CM

## 2023-04-12 DIAGNOSIS — G47.09 OTHER INSOMNIA: Primary | ICD-10-CM

## 2023-04-12 PROCEDURE — 99214 OFFICE O/P EST MOD 30 MIN: CPT | Performed by: INTERNAL MEDICINE

## 2023-04-16 NOTE — PROGRESS NOTES
Subjective     Jermaine Pichardo is a 53 y.o. male who presents with   Chief Complaint   Patient presents with   • Insomnia   • Depression       History of Present Illness     You have chosen to receive care through a telehealth visit.  Do you consent to use a video/audio connection for your medical care today? Yes  Provider is located in [place].  The patient is located in [place].    Patient presents in f/u with ongoing symptoms related to stopped Suboxone and maintaining sobriety.  He continues to have issues with sleep.  He has not had success with either trazodone or Doxepin.  We are avoiding controlled drugs.   Depression with anxiety/drug addiction in remission.  He is maintained on Zoloft and gabapentin which has helped the withdrawal from Suboxone.          Review of Systems   Constitutional: Positive for fatigue.   Psychiatric/Behavioral: Positive for decreased concentration, dysphoric mood and sleep disturbance. Negative for suicidal ideas. The patient is nervous/anxious.        The following portions of the patient's history were reviewed and updated as appropriate: allergies, current medications and problem list.    Patient Active Problem List    Diagnosis Date Noted   • Drug addiction in remission 04/12/2023   • Hyperlipidemia 04/08/2022   • Depression with anxiety 01/08/2019   • DDD (degenerative disc disease), lumbar 09/14/2018   • Withdrawal from opioids 07/27/2017   • Low vitamin B12 level 02/14/2017   • Asthma 02/11/2016     Note Last Updated: 2/11/2016     Impression: 10/27/2015 - This is not well controlled.  He is smoking again and knows that he needs to quit.  I have really encouraged him to do so.  I am prescribing him Singulair.  Impression: 04/29/2014 - Refill inhaler.;      • Essential hypertension 02/11/2016   • Other insomnia 02/11/2016       Current Outpatient Medications on File Prior to Visit   Medication Sig Dispense Refill   • albuterol sulfate  (90 Base) MCG/ACT inhaler Inhale 1 puff  Every 4 (Four) Hours As Needed for Wheezing. 18 g 8   • ciprofloxacin-dexamethasone (Ciprodex) 0.3-0.1 % otic suspension Administer 4 drops into both ears 2 (Two) Times a Day. 7.5 mL 0   • gabapentin (NEURONTIN) 100 MG capsule Take 4 capsules by mouth 3 (Three) Times a Day. 360 capsule 0   • lisinopril (PRINIVIL,ZESTRIL) 20 MG tablet Take 1 tablet by mouth Daily. 90 tablet 1   • sertraline (ZOLOFT) 100 MG tablet TAKE 1 AND 1/2 TABLETS BY MOUTH EVERY  tablet 2   • sildenafil (Viagra) 100 MG tablet Take 1 tablet by mouth Daily As Needed for Erectile Dysfunction. 30 tablet 11     No current facility-administered medications on file prior to visit.       Objective     There were no vitals taken for this visit.    Physical Exam  Constitutional:       Appearance: He is well-developed.   HENT:      Head: Atraumatic.   Pulmonary:      Effort: Pulmonary effort is normal.   Neurological:      Mental Status: He is alert and oriented to person, place, and time.         Assessment & Plan   Diagnoses and all orders for this visit:    1. Other insomnia (Primary)    2. Depression with anxiety    3. Withdrawal from opioids    4. Drug addiction in remission        Discussion    Patient presents in f/u of insomnia, depression with anxiety, withdrawal from opiods and drug addiction in remission.  He will continue Zoloft and gabapentin.  He is going to AA meetings.  He is also going to look into his programs EAP program.  He is currently unable to return to work due to ongoing issues with insomnia, depression and anxiety.  He is going to look into his employers EAP program and continue AA meetings.  Plan next assessment on 4/28 with anticipated return to work date of 5/1/2023.      I spent 30 minutes caring for Jermaine on this date of service. This time includes time spent by me in the following activities: preparing for the visit, counseling and educating the patient/family/caregiver and documenting information in the medical  record.          Future Appointments   Date Time Provider Department Center   7/12/2023  8:30 AM [facility]   4/5/2024  8:20 AM [facility]   4/12/2024  8:15 AM Neena Sadler MD [facility]

## 2023-04-26 ENCOUNTER — TELEPHONE (OUTPATIENT)
Dept: INTERNAL MEDICINE | Facility: CLINIC | Age: 53
End: 2023-04-26
Payer: COMMERCIAL

## 2023-04-26 NOTE — TELEPHONE ENCOUNTER
Caller: Jermaine Pichardo    Relationship: Self    Best call back number: [phone]    Who are you requesting to speak with (clinical staff, provider,  specific staff member): CLINICAL    What was the call regarding:PATIENT IS NEEDING ALL OFFICE NOTES AND INFORMATION FROM 04/10/23 TO PRESENT SENT TO HIS SHORT TERM DISABILITY THROUGH [facility]. PATIENT STATED  HAD THE FAX INFORMATION    Do you require a callback: YES

## 2023-04-27 ENCOUNTER — TELEPHONE (OUTPATIENT)
Dept: INTERNAL MEDICINE | Facility: CLINIC | Age: 53
End: 2023-04-27

## 2023-04-27 NOTE — TELEPHONE ENCOUNTER
Caller: AARON HERRERA    Best call back number: [phone]    Who are you requesting to speak with (clinical staff, provider,  specific staff member): CLINICAL STAFF     What was the call regarding: NEED MORE INFORMATION OF WHY PATIENT IS BEING KEPT OFF WORK WITH MENTAL HEALTH DIAGNOSES PLEASE CALL OR FAX MORE INFORMATION TO FAX # [phone] NEED BY 10 AM TOMORROW     Do you require a callback: YES

## 2023-05-04 ENCOUNTER — TELEPHONE (OUTPATIENT)
Dept: INTERNAL MEDICINE | Facility: CLINIC | Age: 53
End: 2023-05-04

## 2023-05-04 DIAGNOSIS — F11.93 WITHDRAWAL FROM OPIOIDS: ICD-10-CM

## 2023-05-04 RX ORDER — GABAPENTIN 100 MG/1
400 CAPSULE ORAL 3 TIMES DAILY
Qty: 360 CAPSULE | Refills: 0 | Status: SHIPPED | OUTPATIENT
Start: 2023-05-04

## 2023-05-04 RX ORDER — GABAPENTIN 100 MG/1
400 CAPSULE ORAL 3 TIMES DAILY
Qty: 360 CAPSULE | Refills: 0 | Status: CANCELLED | OUTPATIENT
Start: 2023-05-04

## 2023-05-04 NOTE — TELEPHONE ENCOUNTER
Caller: Jermaine Pichardo    Relationship: Self    Best call back number: [phone]    What is the best time to reach you: ANY     Who are you requesting to speak with (clinical staff, provider,  specific staff member): CLINICAL STAFF         What was the call regarding: REFILL OF GABAPENTIN WAS ALREADY SENT IN, BECAUSE IT IS EARLY THE PHARMACY WILL NOT FILL. CAN THE PHARMACY BE CONTACTED TO AUTHORIZE THE PRESCRIPTION?  [facility]/pharmacy #[id number] - [place], [place] - [address] AT [facility] - [phone]  - [phone] FX        Do you require a callback: YES

## 2023-05-04 NOTE — TELEPHONE ENCOUNTER
Caller: Jermaine Pichardo    Relationship: Self    Best call back number: [phone]    Requested Prescriptions: gabapentin (NEURONTIN) 100 MG capsule    Pharmacy where request should be sent: [facility]/PHARMACY #[id number] - [place], [place] - [address] AT [facility] - [phone] [facility] [phone] FX     Last office visit with prescribing clinician: 4/4/2023   Last telemedicine visit with prescribing clinician: 7/12/2023   Next office visit with prescribing clinician: 4/12/2024     Additional details provided by patient: PATIENT HAS 1 DOSE LEFT FOR TODAY 5/4/2023.  HUB UNABLE TO FIND ON ENCOUNTER MEDICATION LIST.    Does the patient have less than a 3 day supply:  [x] Yes  [] No    Would you like a call back once the refill request has been completed: [] Yes [] No    If the office needs to give you a call back, can they leave a voicemail: [] Yes [] No    Erik Benz Rep   05/04/23 09:23 EDT

## 2023-05-05 NOTE — TELEPHONE ENCOUNTER
Spoke with pharmacy. Okayed to refill early this once and not allowed to fill early anymore after this, unless script is increased.     I spoke with patient he stated he had spilled  some pills and they got wet and that is why he ran out early.

## 2023-05-09 ENCOUNTER — TELEPHONE (OUTPATIENT)
Dept: INTERNAL MEDICINE | Facility: CLINIC | Age: 53
End: 2023-05-09
Payer: COMMERCIAL

## 2023-05-09 NOTE — TELEPHONE ENCOUNTER
Caller: Jermaine Pichardo    Relationship: Self    Best call back number: [phone]       What was the call regarding:     PATIENT WANTS YOU TO KNOW THAT HIS SHORT TERM DISABILITY WAS NOT APPROVED.  THAT THE MEDICAL DOCUMENTATION THAT WAS SUBMITTED DID NOT SUPPORT THE NEED FOR PATIENT NOT TO RETURN TO WORK AND DO HIS JOB DUTIES.    PATIENT FEELS THAT HE IS NOT ABLE TO RETURN TO WORK AND IS HAVING TO USE VACATION TIME TO COVER HIS ABSENCES.  THIS IS BASED ON MENTAL STATUS TO PERFORM HIS WORK DUTIES AS WELL AS HIS DAY TO DAY FUNCTIONS.      Do you require a callback: YES

## 2023-05-15 NOTE — TELEPHONE ENCOUNTER
"Call and advise.  I reflected everything he told me in my notes and further in my third phone meeting with them.  Read him my note.  [employer] chose to deny his disability claim.     \"Patient presents in f/u of insomnia, depression with anxiety, withdrawal from opiods and drug addiction in remission.  He will continue Zoloft and gabapentin.  He is going to AA meetings.  He is also going to look into his programs EAP program.  He is currently unable to return to work due to ongoing issues with insomnia, depression and anxiety.  He is going to look into his employers EAP program and continue AA meetings.  Plan next assessment on 4/28 with anticipated return to work date of 5/1/2023.\"  "

## 2023-05-16 ENCOUNTER — TELEMEDICINE (OUTPATIENT)
Dept: INTERNAL MEDICINE | Facility: CLINIC | Age: 53
End: 2023-05-16
Payer: COMMERCIAL

## 2023-05-16 DIAGNOSIS — G47.09 OTHER INSOMNIA: ICD-10-CM

## 2023-05-16 DIAGNOSIS — F19.21 DRUG ADDICTION IN REMISSION: ICD-10-CM

## 2023-05-16 DIAGNOSIS — F41.8 DEPRESSION WITH ANXIETY: Primary | ICD-10-CM

## 2023-05-16 PROCEDURE — 99213 OFFICE O/P EST LOW 20 MIN: CPT | Performed by: INTERNAL MEDICINE

## 2023-05-16 RX ORDER — SILDENAFIL 100 MG/1
100 TABLET, FILM COATED ORAL DAILY PRN
Qty: 30 TABLET | Refills: 11 | Status: SHIPPED | OUTPATIENT
Start: 2023-05-16

## 2023-05-16 NOTE — PROGRESS NOTES
Subjective     Jermaine Pichardo is a 53 y.o. male who presents with   Chief Complaint   Patient presents with   • Depression       History of Present Illness     You have chosen to receive care through a telehealth visit.  Do you consent to use a video/audio connection for your medical care today? Yes  Provider is located in [place].  The patient is located in [place].     Patient presents in f/u with ongoing symptoms related to stopping  Suboxone and maintaining sobriety.  He continues to have issues with sleep.  Depression with anxiety/drug addiction in remission.  He is maintained on Zoloft and gabapentin which has helped the withdrawal from Suboxone.  However, he states his symptoms remain poorly controlled.  He scores a 21 on the PHQ-9 scale and a 15 on the TAQUERIA-7 scale.  He denies HI/SI.  He feels sadness, lacks motivation, his concentration is decreased and he is nervous anxious much of the time.         Review of Systems   Constitutional: Positive for fatigue.   Psychiatric/Behavioral: Positive for decreased concentration and dysphoric mood. The patient is nervous/anxious.        The following portions of the patient's history were reviewed and updated as appropriate: allergies, current medications and problem list.    Patient Active Problem List    Diagnosis Date Noted   • Drug addiction in remission 04/12/2023   • Hyperlipidemia 04/08/2022   • Depression with anxiety 01/08/2019   • DDD (degenerative disc disease), lumbar 09/14/2018   • Withdrawal from opioids 07/27/2017   • Low vitamin B12 level 02/14/2017   • Asthma 02/11/2016     Note Last Updated: 2/11/2016     Impression: 10/27/2015 - This is not well controlled.  He is smoking again and knows that he needs to quit.  I have really encouraged him to do so.  I am prescribing him Singulair.  Impression: 04/29/2014 - Refill inhaler.;      • Essential hypertension 02/11/2016   • Other insomnia 02/11/2016       Current Outpatient Medications on File Prior to Visit    Medication Sig Dispense Refill   • albuterol sulfate  (90 Base) MCG/ACT inhaler Inhale 1 puff Every 4 (Four) Hours As Needed for Wheezing. 18 g 8   • gabapentin (NEURONTIN) 100 MG capsule TAKE 4 CAPSULES BY MOUTH 3 (THREE) TIMES A DAY.. 360 capsule 0   • lisinopril (PRINIVIL,ZESTRIL) 20 MG tablet Take 1 tablet by mouth Daily. 90 tablet 1   • sertraline (ZOLOFT) 100 MG tablet TAKE 1 AND 1/2 TABLETS BY MOUTH EVERY  tablet 2     No current facility-administered medications on file prior to visit.       Objective     There were no vitals taken for this visit.    Physical Exam  Constitutional:       Appearance: He is well-developed.   HENT:      Head: Atraumatic.   Pulmonary:      Effort: Pulmonary effort is normal.   Neurological:      Mental Status: He is alert and oriented to person, place, and time.   Psychiatric:         Attention and Perception: Attention normal.         Mood and Affect: Mood is depressed.         Speech: Speech normal.         Behavior: Behavior normal.         Thought Content: Thought content normal.         Cognition and Memory: Cognition normal.         Judgment: Judgment normal.         Assessment & Plan   Diagnoses and all orders for this visit:    1. Depression with anxiety (Primary)    2. Drug addiction in remission    3. Other insomnia        Discussion  Patient presents in f/u of depression with anxiety that is poorly controlled.  I think at this point in time would be best served by seeing a psychiatrist.  He is given the number to [facility].  He is instructed to call and make an appointment.      I spent 22 minutes caring for Jermaine on this date of service. This time includes time spent by me in the following activities: preparing for the visit, counseling and educating the patient/family/caregiver and documenting information in the medical record.          Future Appointments   Date Time Provider Department Center   7/12/2023  8:30 AM [facility]   4/5/2024  8:20 AM [facility]   4/12/2024  8:15 AM Neena Sadler MD [facility]

## 2023-05-16 NOTE — PATIENT INSTRUCTIONS
[facility], [facility]   [address] [id number]   Phone [phone]

## 2023-05-22 ENCOUNTER — TELEPHONE (OUTPATIENT)
Dept: INTERNAL MEDICINE | Facility: CLINIC | Age: 53
End: 2023-05-22
Payer: COMMERCIAL

## 2023-05-22 NOTE — TELEPHONE ENCOUNTER
Caller: Jermaine Pichardo    Relationship: Self    Best call back number: [phone]    What form or medical record are you requesting: OFFICE NOTES FROM February, 2023 - CURRENT, THE PATIENT WOULD LIKE THIS TO INCLUDE A RECENT QUESTIONNAIRE HE HAD COMPLETED     Who is requesting this form or medical record from you: SELF - PATIENT NEEDS THIS FOR SHORT TERM DISABILITY    How would you like to receive the form or medical records (pick-up, mail, fax): PICK-UP IN OFFICE     Timeframe paperwork needed: ASAP

## 2023-05-22 NOTE — TELEPHONE ENCOUNTER
See message below. If you have fmla paperwork. Patient would like for you to include the questionnaire when you fax

## 2023-06-02 DIAGNOSIS — F11.93 WITHDRAWAL FROM OPIOIDS: ICD-10-CM

## 2023-06-02 RX ORDER — GABAPENTIN 100 MG/1
400 CAPSULE ORAL 3 TIMES DAILY
Qty: 360 CAPSULE | Refills: 0 | Status: SHIPPED | OUTPATIENT
Start: 2023-06-02

## 2023-07-31 DIAGNOSIS — F41.8 DEPRESSION WITH ANXIETY: ICD-10-CM

## 2023-07-31 RX ORDER — GABAPENTIN 600 MG/1
TABLET ORAL
Qty: 90 TABLET | Refills: 5 | Status: SHIPPED | OUTPATIENT
Start: 2023-07-31

## 2023-08-15 ENCOUNTER — TELEPHONE (OUTPATIENT)
Dept: INTERNAL MEDICINE | Facility: CLINIC | Age: 53
End: 2023-08-15
Payer: COMMERCIAL

## 2023-08-15 NOTE — TELEPHONE ENCOUNTER
Caller: Jermaine Pichardo    Relationship: Self    Best call back number: [phone]    What form or medical record are you requesting: RENEWAL [facility] PAPERWORK FOR [employer] ON JULY 24TH DUE TO HIS ONGOING ILLNESS    Who is requesting this form or medical record from you: EMPLOYER     How would you like to receive the form or medical records (pick-up, mail, fax): FAX  If fax, what is the fax number:PATIENT STATED DR ROQUE KNEW THE FAX    Timeframe paperwork needed: AS SOON AS POSSIBLE

## 2023-10-09 RX ORDER — SILDENAFIL 100 MG/1
100 TABLET, FILM COATED ORAL DAILY PRN
Qty: 30 TABLET | Refills: 11 | Status: SHIPPED | OUTPATIENT
Start: 2023-10-09

## 2023-10-09 NOTE — TELEPHONE ENCOUNTER
Caller: Jermaine Pichardo    Relationship: Self    Best call back number: [phone]  Requested Prescriptions:   Requested Prescriptions     Pending Prescriptions Disp Refills    sildenafil (Viagra) 100 MG tablet 30 tablet 11     Sig: Take 1 tablet by mouth Daily As Needed for Erectile Dysfunction.        Pharmacy where request should be sent: [facility]Y #[id number] - [place], [place] - [address] AT [facility] - [phone] [facility] [phone] FX     Last office visit with prescribing clinician: 4/4/2023   Last telemedicine visit with prescribing clinician: Visit date not found   Next office visit with prescribing clinician: 4/12/2024       Does the patient have less than a 3 day supply:  [x] Yes  [] No    Would you like a call back once the refill request has been completed: [] Yes [x] No    If the office needs to give you a call back, can they leave a voicemail: [] Yes [x] No    Erik Dover Rep   10/09/23 10:42 EDT

## 2023-10-10 ENCOUNTER — OFFICE VISIT (OUTPATIENT)
Dept: INTERNAL MEDICINE | Facility: CLINIC | Age: 53
End: 2023-10-10
Payer: COMMERCIAL

## 2023-10-10 VITALS
WEIGHT: 200 LBS | SYSTOLIC BLOOD PRESSURE: 130 MMHG | OXYGEN SATURATION: 98 % | BODY MASS INDEX: 24.36 KG/M2 | HEART RATE: 77 BPM | DIASTOLIC BLOOD PRESSURE: 82 MMHG | HEIGHT: 76 IN

## 2023-10-10 DIAGNOSIS — M51.36 DDD (DEGENERATIVE DISC DISEASE), LUMBAR: Primary | ICD-10-CM

## 2023-10-10 DIAGNOSIS — F41.8 DEPRESSION WITH ANXIETY: ICD-10-CM

## 2023-10-10 PROCEDURE — 99214 OFFICE O/P EST MOD 30 MIN: CPT | Performed by: INTERNAL MEDICINE

## 2023-10-10 RX ORDER — GABAPENTIN 800 MG/1
800 TABLET ORAL 3 TIMES DAILY
Qty: 90 TABLET | Refills: 5 | Status: SHIPPED | OUTPATIENT
Start: 2023-10-10

## 2023-10-10 NOTE — PROGRESS NOTES
Subjective     Jermaine Pichardo is a 53 y.o. male who presents with   Chief Complaint   Patient presents with    Back Pain       Back Pain         C/o intermittent low back.  This time started today.  Localized to low back.  No radiation.  No weakness.  X-rays in past showed degenerative changes and scoliosis.    MDD.  He is well controlled on gabapentin and zoloft.     Review of Systems   Respiratory: Negative.     Cardiovascular: Negative.    Musculoskeletal:  Positive for back pain.       The following portions of the patient's history were reviewed and updated as appropriate: allergies, current medications and problem list.    Patient Active Problem List    Diagnosis Date Noted    Drug addiction in remission 04/12/2023    Hyperlipidemia 04/08/2022    Depression with anxiety 01/08/2019    DDD (degenerative disc disease), lumbar 09/14/2018    Withdrawal from opioids 07/27/2017    Low vitamin B12 level 02/14/2017    Asthma 02/11/2016     Note Last Updated: 2/11/2016     Impression: 10/27/2015 - This is not well controlled.  He is smoking again and knows that he needs to quit.  I have really encouraged him to do so.  I am prescribing him Singulair.  Impression: 04/29/2014 - Refill inhaler.;       Essential hypertension 02/11/2016    Other insomnia 02/11/2016       Current Outpatient Medications on File Prior to Visit   Medication Sig Dispense Refill    albuterol sulfate  (90 Base) MCG/ACT inhaler Inhale 1 puff Every 4 (Four) Hours As Needed for Wheezing. 18 g 8    lisinopril (PRINIVIL,ZESTRIL) 20 MG tablet Take 1 tablet by mouth Daily. 90 tablet 1    sertraline (ZOLOFT) 100 MG tablet TAKE 1 AND 1/2 TABLETS BY MOUTH EVERY  tablet 2    sildenafil (Viagra) 100 MG tablet Take 1 tablet by mouth Daily As Needed for Erectile Dysfunction. 30 tablet 11    [DISCONTINUED] gabapentin (NEURONTIN) 100 MG capsule Take 4 capsules by mouth 3 (Three) Times a Day. 84 capsule 0    [DISCONTINUED] gabapentin (NEURONTIN) 600 MG  "tablet TAKE 1 TABLET BY MOUTH THREE TIMES A DAY 90 tablet 5     No current facility-administered medications on file prior to visit.       Objective     /82   Pulse 77   Ht 193 cm (75.98\")   Wt 90.7 kg (200 lb)   SpO2 98%   BMI 24.36 kg/mý     Physical Exam  Constitutional:       Appearance: He is well-developed.   HENT:      Head: Atraumatic.   Pulmonary:      Effort: Pulmonary effort is normal.   Neurological:      Mental Status: He is alert and oriented to person, place, and time.         Assessment & Plan   Diagnoses and all orders for this visit:    1. DDD (degenerative disc disease), lumbar (Primary)    2. Depression with anxiety  -     gabapentin (NEURONTIN) 800 MG tablet; Take 1 tablet by mouth 3 (Three) Times a Day.  Dispense: 90 tablet; Refill: 5        Discussion    Patient presents in f/u of chronic low back pain secondary to degenerative changes.  I discussed with the patient a trial of conservative management with:   NSAIDs, tylenol, rest, and heat.  Let me know if not feeling better over the next several days or if there is any change in symptoms.  Depression with anxiety.  Control is good.  The patient is advised to continue current dosage of Zoloft with gabapentin.             Future Appointments   Date Time Provider Department Center   4/5/2024  8:20 AM [facility]   4/12/2024  8:15 AM Neena Sadler MD [facility]         "

## 2023-10-16 ENCOUNTER — TELEPHONE (OUTPATIENT)
Dept: INTERNAL MEDICINE | Facility: CLINIC | Age: 53
End: 2023-10-16

## 2023-10-16 NOTE — TELEPHONE ENCOUNTER
Caller: Jermaine Pichardo    Relationship: Self    Best call back number: [phone]     What is the best time to reach you: ANYTIME    Who are you requesting to speak with (clinical staff, provider,  specific staff member): JUDE    What was the call regarding: PATIENT STATED HE HAS NOT YET RECEIVED A COPY OF HIS FMLA FORM.    PATIENT STATED ONCE HE RECEIVES THIS FORM, HE WILL FAX IT TO DR ROQUE.    Is it okay if the provider responds through MyChart: YES

## 2023-10-30 DIAGNOSIS — F41.8 DEPRESSION WITH ANXIETY: ICD-10-CM

## 2023-10-30 RX ORDER — GABAPENTIN 800 MG/1
800 TABLET ORAL 4 TIMES DAILY
Qty: 120 TABLET | Refills: 5 | Status: SHIPPED | OUTPATIENT
Start: 2023-10-30

## 2023-10-30 RX ORDER — LISINOPRIL 20 MG/1
20 TABLET ORAL DAILY
Qty: 90 TABLET | Refills: 1 | Status: SHIPPED | OUTPATIENT
Start: 2023-10-30

## 2024-01-16 RX ORDER — SILDENAFIL 100 MG/1
100 TABLET, FILM COATED ORAL DAILY PRN
Qty: 30 TABLET | Refills: 11 | Status: SHIPPED | OUTPATIENT
Start: 2024-01-16

## 2024-01-16 RX ORDER — SILDENAFIL 100 MG/1
100 TABLET, FILM COATED ORAL DAILY PRN
Qty: 30 TABLET | Refills: 11 | Status: SHIPPED | OUTPATIENT
Start: 2024-01-16 | End: 2024-01-16 | Stop reason: SDUPTHER

## 2024-04-04 DIAGNOSIS — R79.89 LOW VITAMIN B12 LEVEL: ICD-10-CM

## 2024-04-04 DIAGNOSIS — I10 ESSENTIAL HYPERTENSION: ICD-10-CM

## 2024-04-04 DIAGNOSIS — E78.5 HYPERLIPIDEMIA, UNSPECIFIED HYPERLIPIDEMIA TYPE: Primary | ICD-10-CM

## 2024-04-05 LAB
ALBUMIN SERPL-MCNC: 4.9 G/DL (ref 3.5–5.2)
ALBUMIN/GLOB SERPL: 2.2 G/DL
ALP SERPL-CCNC: 90 U/L (ref 39–117)
ALT SERPL-CCNC: 12 U/L (ref 1–41)
APPEARANCE UR: CLEAR
AST SERPL-CCNC: 14 U/L (ref 1–40)
BACTERIA #/AREA URNS HPF: ABNORMAL /HPF
BASOPHILS # BLD AUTO: 0.1 10*3/MM3 (ref 0–0.2)
BASOPHILS NFR BLD AUTO: 2 % (ref 0–1.5)
BILIRUB SERPL-MCNC: <0.2 MG/DL (ref 0–1.2)
BILIRUB UR QL STRIP: NEGATIVE
BUN SERPL-MCNC: 10 MG/DL (ref 6–20)
BUN/CREAT SERPL: 9.8 (ref 7–25)
CALCIUM SERPL-MCNC: 9.7 MG/DL (ref 8.6–10.5)
CASTS URNS MICRO: ABNORMAL
CHLORIDE SERPL-SCNC: 104 MMOL/L (ref 98–107)
CHOLEST SERPL-MCNC: 273 MG/DL (ref 0–200)
CO2 SERPL-SCNC: 27 MMOL/L (ref 22–29)
COLOR UR: ABNORMAL
CREAT SERPL-MCNC: 1.02 MG/DL (ref 0.76–1.27)
EGFRCR SERPLBLD CKD-EPI 2021: 87.9 ML/MIN/1.73
EOSINOPHIL # BLD AUTO: 0.44 10*3/MM3 (ref 0–0.4)
EOSINOPHIL NFR BLD AUTO: 8.9 % (ref 0.3–6.2)
EPI CELLS #/AREA URNS HPF: ABNORMAL /HPF
ERYTHROCYTE [DISTWIDTH] IN BLOOD BY AUTOMATED COUNT: 12.4 % (ref 12.3–15.4)
GLOBULIN SER CALC-MCNC: 2.2 GM/DL
GLUCOSE SERPL-MCNC: 110 MG/DL (ref 65–99)
GLUCOSE UR QL STRIP: NEGATIVE
HCT VFR BLD AUTO: 41.7 % (ref 37.5–51)
HDLC SERPL-MCNC: 54 MG/DL (ref 40–60)
HGB BLD-MCNC: 13.9 G/DL (ref 13–17.7)
HGB UR QL STRIP: NEGATIVE
IMM GRANULOCYTES # BLD AUTO: 0 10*3/MM3 (ref 0–0.05)
IMM GRANULOCYTES NFR BLD AUTO: 0 % (ref 0–0.5)
KETONES UR QL STRIP: ABNORMAL
LDLC SERPL CALC-MCNC: 197 MG/DL (ref 0–100)
LEUKOCYTE ESTERASE UR QL STRIP: NEGATIVE
LYMPHOCYTES # BLD AUTO: 1.57 10*3/MM3 (ref 0.7–3.1)
LYMPHOCYTES NFR BLD AUTO: 31.7 % (ref 19.6–45.3)
MCH RBC QN AUTO: 30.6 PG (ref 26.6–33)
MCHC RBC AUTO-ENTMCNC: 33.3 G/DL (ref 31.5–35.7)
MCV RBC AUTO: 91.9 FL (ref 79–97)
MONOCYTES # BLD AUTO: 0.46 10*3/MM3 (ref 0.1–0.9)
MONOCYTES NFR BLD AUTO: 9.3 % (ref 5–12)
MUCOUS THREADS URNS QL MICRO: ABNORMAL /HPF
NEUTROPHILS # BLD AUTO: 2.38 10*3/MM3 (ref 1.7–7)
NEUTROPHILS NFR BLD AUTO: 48.1 % (ref 42.7–76)
NITRITE UR QL STRIP: NEGATIVE
NRBC BLD AUTO-RTO: 0 /100 WBC (ref 0–0.2)
PH UR STRIP: 5.5 [PH] (ref 5–8)
PLATELET # BLD AUTO: 333 10*3/MM3 (ref 140–450)
POTASSIUM SERPL-SCNC: 4.5 MMOL/L (ref 3.5–5.2)
PROT SERPL-MCNC: 7.1 G/DL (ref 6–8.5)
PROT UR QL STRIP: ABNORMAL
RBC # BLD AUTO: 4.54 10*6/MM3 (ref 4.14–5.8)
RBC #/AREA URNS HPF: ABNORMAL /HPF
SODIUM SERPL-SCNC: 142 MMOL/L (ref 136–145)
SP GR UR STRIP: 1.03 (ref 1–1.03)
TRIGL SERPL-MCNC: 121 MG/DL (ref 0–150)
TSH SERPL DL<=0.005 MIU/L-ACNC: 1.26 UIU/ML (ref 0.27–4.2)
UROBILINOGEN UR STRIP-MCNC: ABNORMAL MG/DL
VIT B12 SERPL-MCNC: 329 PG/ML (ref 211–946)
VLDLC SERPL CALC-MCNC: 22 MG/DL (ref 5–40)
WBC # BLD AUTO: 4.95 10*3/MM3 (ref 3.4–10.8)
WBC #/AREA URNS HPF: ABNORMAL /HPF

## 2024-04-10 LAB
PSA SERPL-MCNC: 0.54 NG/ML (ref 0–4)
WRITTEN AUTHORIZATION: NORMAL

## 2024-04-12 ENCOUNTER — OFFICE VISIT (OUTPATIENT)
Dept: INTERNAL MEDICINE | Facility: CLINIC | Age: 54
End: 2024-04-12
Payer: COMMERCIAL

## 2024-04-12 VITALS
BODY MASS INDEX: 25.21 KG/M2 | OXYGEN SATURATION: 98 % | HEIGHT: 76 IN | SYSTOLIC BLOOD PRESSURE: 124 MMHG | WEIGHT: 207 LBS | HEART RATE: 74 BPM | DIASTOLIC BLOOD PRESSURE: 92 MMHG

## 2024-04-12 DIAGNOSIS — Z00.00 WELL ADULT EXAM: Primary | ICD-10-CM

## 2024-04-12 DIAGNOSIS — K62.5 RECTAL BLEEDING: ICD-10-CM

## 2024-04-12 DIAGNOSIS — E78.5 HYPERLIPIDEMIA, UNSPECIFIED HYPERLIPIDEMIA TYPE: ICD-10-CM

## 2024-04-12 DIAGNOSIS — K64.9 HEMORRHOIDS, UNSPECIFIED HEMORRHOID TYPE: ICD-10-CM

## 2024-04-12 DIAGNOSIS — M51.36 DDD (DEGENERATIVE DISC DISEASE), LUMBAR: ICD-10-CM

## 2024-04-12 DIAGNOSIS — F41.8 DEPRESSION WITH ANXIETY: ICD-10-CM

## 2024-04-12 DIAGNOSIS — H60.93 OTITIS EXTERNA OF BOTH EARS, UNSPECIFIED CHRONICITY, UNSPECIFIED TYPE: ICD-10-CM

## 2024-04-12 DIAGNOSIS — M41.20 OTHER IDIOPATHIC SCOLIOSIS, UNSPECIFIED SPINAL REGION: ICD-10-CM

## 2024-04-12 DIAGNOSIS — R39.198 DIFFICULTY URINATING: ICD-10-CM

## 2024-04-12 DIAGNOSIS — Z12.11 COLON CANCER SCREENING: ICD-10-CM

## 2024-04-12 RX ORDER — GABAPENTIN 800 MG/1
800 TABLET ORAL 4 TIMES DAILY
Qty: 120 TABLET | Refills: 5 | Status: SHIPPED | OUTPATIENT
Start: 2024-04-12 | End: 2024-04-15 | Stop reason: SDUPTHER

## 2024-04-12 RX ORDER — CICLOPIROX 80 MG/ML
SOLUTION TOPICAL NIGHTLY
Qty: 6 ML | Refills: 0 | Status: SHIPPED | OUTPATIENT
Start: 2024-04-12

## 2024-04-12 RX ORDER — CIPROFLOXACIN AND DEXAMETHASONE 3; 1 MG/ML; MG/ML
4 SUSPENSION/ DROPS AURICULAR (OTIC) 2 TIMES DAILY
Qty: 7.5 EACH | Refills: 0 | Status: SHIPPED | OUTPATIENT
Start: 2024-04-12

## 2024-04-12 RX ORDER — ATORVASTATIN CALCIUM 20 MG/1
20 TABLET, FILM COATED ORAL DAILY
Qty: 90 TABLET | Refills: 3 | Status: SHIPPED | OUTPATIENT
Start: 2024-04-12

## 2024-04-12 NOTE — PROGRESS NOTES
Subjective     Jermaine Pichardo is a 54 y.o. male who presents for a complete physical exam.      History of Present Illness     C/o itching in ears.  Discharge is associated.    C/o dribbling of urine after finishes urinating.    C/o rectal bleeding.  He has a hemorrhoid that get irritated.    C/o toenail fungus.  C/o scoliosis    Depression with anxiety.  Feels well on Zoloft and gabapentin.    HLD.  Control is good.   HLD.  Control is poor.      Review of Systems   Respiratory: Negative.     Cardiovascular: Negative.    Musculoskeletal:  Positive for back pain.       The following portions of the patient's history were reviewed and updated as appropriate: allergies, current medications, past family history, past medical history, past social history, past surgical history and problem list.  Health maintenance tab was reviewed and updated with the patient.       Patient Active Problem List    Diagnosis Date Noted    Drug addiction in remission 04/12/2023    Hyperlipidemia 04/08/2022    Depression with anxiety 01/08/2019    DDD (degenerative disc disease), lumbar 09/14/2018    Withdrawal from opioids 07/27/2017    Low vitamin B12 level 02/14/2017    Asthma 02/11/2016     Note Last Updated: 2/11/2016     Impression: 10/27/2015 - This is not well controlled.  He is smoking again and knows that he needs to quit.  I have really encouraged him to do so.  I am prescribing him Singulair.  Impression: 04/29/2014 - Refill inhaler.;       Essential hypertension 02/11/2016    Other insomnia 02/11/2016       Past Medical History:   Diagnosis Date    Actinic keratosis     Anxiety     Asthma        Past Surgical History:   Procedure Laterality Date    MANDIBLE SURGERY         Family History   Problem Relation Age of Onset    Diabetes Mother     Colon cancer Paternal Grandfather        Social History     Socioeconomic History    Marital status:     Number of children: 3   Tobacco Use    Smoking status: Former     Current  "packs/day: 0.00     Average packs/day: 1 pack/day for 17.0 years (17.0 ttl pk-yrs)     Types: Cigarettes     Start date: 2006     Quit date: 2023     Years since quittin.2    Smokeless tobacco: Never    Tobacco comments:     started at age 41   Vaping Use    Vaping status: Never Used   Substance and Sexual Activity    Alcohol use: Not Currently     Comment: 2-3/day    Drug use: No       Current Outpatient Medications on File Prior to Visit   Medication Sig Dispense Refill    albuterol sulfate  (90 Base) MCG/ACT inhaler Inhale 1 puff Every 4 (Four) Hours As Needed for Wheezing. 18 g 8    sertraline (ZOLOFT) 100 MG tablet TAKE 1 AND 1/2 TABLETS BY MOUTH EVERY  tablet 2    sildenafil (Viagra) 100 MG tablet Take 1 tablet by mouth Daily As Needed for Erectile Dysfunction. 30 tablet 11    [DISCONTINUED] gabapentin (NEURONTIN) 800 MG tablet Take 1 tablet by mouth 4 (Four) Times a Day. 120 tablet 5    [DISCONTINUED] lisinopril (PRINIVIL,ZESTRIL) 20 MG tablet TAKE 1 TABLET BY MOUTH EVERY DAY 90 tablet 1     No current facility-administered medications on file prior to visit.       No Known Allergies    Immunization History   Administered Date(s) Administered    COVID-19 (PFIZER) Purple Cap Monovalent 2021, 04/10/2021, 12/15/2021    Pneumococcal Polysaccharide (PPSV23) 2017    Td (TDVAX) 2009    Tdap 2019    flucelvax quad pfs =>4 YRS 2019       Objective     /92   Pulse 74   Ht 193 cm (75.98\")   Wt 93.9 kg (207 lb)   SpO2 98%   BMI 25.21 kg/m²     Physical Exam  Constitutional:       Appearance: He is well-developed.   HENT:      Head: Normocephalic and atraumatic.      Right Ear: Hearing and tympanic membrane normal.      Left Ear: Hearing and tympanic membrane normal.      Ears:      Comments: Scaling of skin      Mouth/Throat:      Pharynx: No posterior oropharyngeal erythema.   Neck:      Thyroid: No thyromegaly.   Cardiovascular:      Rate and Rhythm: " Normal rate and regular rhythm.      Heart sounds: Normal heart sounds. No murmur heard.  Pulmonary:      Effort: Pulmonary effort is normal.      Breath sounds: Normal breath sounds.   Abdominal:      General: There is no distension.      Palpations: Abdomen is soft.      Tenderness: There is no abdominal tenderness.   Genitourinary:     Penis: Normal.       Testes: Normal.      Prostate: Normal.      Rectum: Normal. External hemorrhoid and internal hemorrhoid present.   Musculoskeletal:      Cervical back: Neck supple.   Feet:      Right foot:      Toenail Condition: Fungal disease present.     Left foot:      Toenail Condition: Fungal disease present.  Lymphadenopathy:      Cervical: No cervical adenopathy.   Skin:     General: Skin is warm and dry.   Neurological:      Mental Status: He is alert and oriented to person, place, and time.   Psychiatric:         Speech: Speech normal.         Behavior: Behavior normal.         Thought Content: Thought content normal.         Judgment: Judgment normal.         Assessment & Plan   Diagnoses and all orders for this visit:    1. Well adult exam (Primary)    2. Colon cancer screening  -     Cancel: Ambulatory Referral For Screening Colonoscopy  -     Ambulatory Referral to General Surgery    3. Rectal bleeding  -     Ambulatory Referral to General Surgery    4. Difficulty urinating  -     Ambulatory Referral to Urology    5. DDD (degenerative disc disease), lumbar  -     Ambulatory Referral to Physical Therapy Evaluate and treat    6. Other idiopathic scoliosis, unspecified spinal region  -     Ambulatory Referral to Physical Therapy Evaluate and treat    7. Depression with anxiety  -     gabapentin (NEURONTIN) 800 MG tablet; Take 1 tablet by mouth 4 (Four) Times a Day.  Dispense: 120 tablet; Refill: 5    8. Hemorrhoids, unspecified hemorrhoid type    9. Otitis externa of both ears, unspecified chronicity, unspecified type    10. Hyperlipidemia, unspecified  hyperlipidemia type    Other orders  -     ciclopirox (PENLAC) 8 % solution; Apply  topically to the appropriate area as directed Every Night.  Dispense: 6 mL; Refill: 0  -     atorvastatin (LIPITOR) 20 MG tablet; Take 1 tablet by mouth Daily.  Dispense: 90 tablet; Refill: 3  -     ciprofloxacin-dexAMETHasone (Ciprodex) 0.3-0.1 % otic suspension; Administer 4 drops into both ears 2 (Two) Times a Day.  Dispense: 7.5 each; Refill: 0        Discussion    Patient presents today for a CPE.      Patient follows a healthy diet.   Patient follows an adequate exercise regimen. Prostate cancer screening is up to date.  Patient has been referred for colon cancer screening.   Discussed importance of preventative care including vaccinations, age appropriate cancer screening, routine lab work, healthy diet, and active lifestyle.   Otitis externa.  Rx for Ciprodex drops.    Onychomycosis.  Rx for Penlac topical.    Difficulty with persistent dribbling of urine.  Referral for urological evaluation is placed.    Rectal bleeding associated hemorrhoids.   We discussed increased attention to water, fiber, dedicated time for bowel movements and stool softener.   DDD and scoliosis.  I discussed with the patient a trial of conservative management with:   physical therapy.    Depression with anxiety.  Control is good.  The patient is advised to continue current dosage of Zoloft and gabapentin.    HLD.  I reviewed cholesterol labs with the patient.  I think the patient needs to consider a statin medication.  I would do blood work every 3 months initially to monitor liver enzymes.  Side effects could include muscle aches which the patient should let me know if they have.             Health Maintenance   Topic Date Due    COLORECTAL CANCER SCREENING  Never done    Pneumococcal Vaccine 0-64 (2 of 2 - PCV) 04/27/2018    ZOSTER VACCINE (1 of 2) Never done    ANNUAL PHYSICAL  04/04/2024    COVID-19 Vaccine (4 - 2023-24 season) 04/14/2024  (Originally 9/1/2023)    INFLUENZA VACCINE  08/01/2024    LIPID PANEL  04/05/2025    BMI FOLLOWUP  04/12/2025    TDAP/TD VACCINES (3 - Td or Tdap) 01/08/2029    HEPATITIS C SCREENING  Completed            Future Appointments   Date Time Provider Department Center   10/1/2024  8:00 AM [facility]   10/8/2024  9:00 AM Neena Sadler MD [facility]   4/11/2025  8:20 AM [facility]   4/16/2025  7:45 AM Neena Sadler MD [facility]

## 2024-04-15 DIAGNOSIS — F41.8 DEPRESSION WITH ANXIETY: ICD-10-CM

## 2024-04-15 RX ORDER — GABAPENTIN 800 MG/1
800 TABLET ORAL 4 TIMES DAILY
Qty: 120 TABLET | Refills: 5 | Status: SHIPPED | OUTPATIENT
Start: 2024-04-15

## 2024-04-15 NOTE — TELEPHONE ENCOUNTER
Caller: Jermaine Pichardo    Relationship: Self    Best call back number: [phone]     Requested Prescriptions:   Requested Prescriptions     Pending Prescriptions Disp Refills    gabapentin (NEURONTIN) 800 MG tablet 120 tablet 5     Sig: Take 1 tablet by mouth 4 (Four) Times a Day.        Pharmacy where request should be sent: [id number] - [place], [place] - [address] [facility] - [phone] [facility] [phone] FX     Last office visit with prescribing clinician: 4/12/2024   Last telemedicine visit with prescribing clinician: Visit date not found   Next office visit with prescribing clinician: 10/8/2024     Additional details provided by patient:     Does the patient have less than a 3 day supply:  [] Yes  [x] No    Would you like a call back once the refill request has been completed: [] Yes [] No    If the office needs to give you a call back, can they leave a voicemail: [] Yes [] No    April Erik Dickerson Rep   04/15/24 09:07 EDT

## 2024-04-18 ENCOUNTER — TELEPHONE (OUTPATIENT)
Dept: INTERNAL MEDICINE | Facility: CLINIC | Age: 54
End: 2024-04-18
Payer: COMMERCIAL

## 2024-04-18 NOTE — TELEPHONE ENCOUNTER
Please see attached message and advise. Thank you      Is this okay to fill two days early?     Please send reply to pool.

## 2024-04-18 NOTE — TELEPHONE ENCOUNTER
Caller: Jermaine Pichardo    Relationship: Self    Best call back number: [phone]     What is the best time to reach you: ANYTIME    Who are you requesting to speak with (clinical staff, provider,  specific staff member):         What was the call regarding: PATIENT WAS TAKING gabapentin (NEURONTIN) 800 MG tablet. PATIENT IS OUT OF MEDICATION 2 DAYS EARLY DUE TO THEM CHANGING THE MANUFACTURE OF THE MEDICATION AND IT NOT BEING AS EFFECTIVE.    PATIENT STATED THAT THEY ALREADY HAVE ONE ON FILE TO FILL BUT WILL NOT FILL IT BECAUSE IT IS EARLY BY 2 DAYS.    CAN SOMEONE REACH OUT TO THE PHARMACY TO GIVE THEM A OKAY TO GO AHEAD AND FILL THE MEDICATION    [facility] #[id number] - [place], [place] - [address] [facility] [address] - [phone] [facility] [phone] FX

## 2024-04-23 ENCOUNTER — OFFICE VISIT (OUTPATIENT)
Dept: SURGERY | Facility: CLINIC | Age: 54
End: 2024-04-23
Payer: COMMERCIAL

## 2024-04-23 VITALS
HEIGHT: 76 IN | BODY MASS INDEX: 25.21 KG/M2 | WEIGHT: 207 LBS | SYSTOLIC BLOOD PRESSURE: 124 MMHG | DIASTOLIC BLOOD PRESSURE: 72 MMHG

## 2024-04-23 DIAGNOSIS — Z80.0 FAMILY HISTORY OF COLON CANCER: ICD-10-CM

## 2024-04-23 DIAGNOSIS — Z12.11 SCREENING FOR COLON CANCER: ICD-10-CM

## 2024-04-23 DIAGNOSIS — K62.5 RECTAL BLEEDING: Primary | ICD-10-CM

## 2024-04-23 PROCEDURE — 99203 OFFICE O/P NEW LOW 30 MIN: CPT | Performed by: SURGERY

## 2024-04-23 RX ORDER — LISINOPRIL 20 MG/1
10 TABLET ORAL DAILY
COMMUNITY
Start: 2024-04-22

## 2024-04-23 NOTE — H&P (VIEW-ONLY)
"General Surgery  Initial Office Visit    CC: Rectal bleeding    HPI: The patient is a pleasant 54 y.o. year-old gentleman who presents today for evaluation of intermittent rectal bleeding that has been ongoing for the last 6 months.  He will have painless hematochezia with about 90% of his bowel movements.  Rarely, he will have a nonbloody bowel movement.  He describes the blood as being a bright red blood noticed only on the toilet paper when wiping.  He denies having to strain or struggle with constipation.  He feels as though he has hemorrhoids and has a \"mass\" on the outside of his rectum/anus.  On exam, he only has a couple of tiny external anal skin tags appreciable.  He has a family history of colon cancer in his paternal grandfather.  He has never previously undergone colonoscopic evaluation and has not taken any medication for his hematochezia.    Past Medical History:   Asthma  Anxiety  Hypertension  Hyperlipidemia  Depression with anxiety  History of drug addiction in remission  Erectile dysfunction  Vitamin B12 deficiency    Past Surgical History:   Mandible surgery    Medications:   Albuterol inhaler 1 puff every 4 hours as needed for wheezing  Atorvastatin 20 mg daily  Ciclopirox 8% solution nightly  Ciprofloxacin/dexamethasone eardrops twice daily  Gabapentin 800 mg 4 times daily  Lisinopril 20 mg daily  Sertraline 150 mg daily  Sildenafil 100 mg as needed for erectile dysfunction    Allergies: No known drug allergies    Family History: Paternal grandfather with history of colon cancer or prostate cancer (he is unsure)    Social History: , former smoker but quit in 2023, no regular alcohol use currently, history of opioid abuse and alcohol abuse    ROS:   Constitutional: Negative for fevers or chills  HENT: Negative for hearing loss or runny nose  Eyes: Negative for vision changes or scleral icterus  Respiratory: Negative for cough or shortness of breath  Cardiovascular: Negative for chest pain " or heart palpitations  Gastrointestinal: Positive for hematochezia; negative for abdominal distension, nausea, vomiting, constipation, or melena  Genitourinary: Negative for hematuria or dysuria  Musculoskeletal: Negative for joint swelling or gait instability  Neurologic: Negative for tremors or seizures  Psychiatric: Negative for suicidal ideations or agitation  All other systems reviewed and negative    Physical Exam:  Height: 193 cm  Weight: 93.9 kg  BMI: 25.2  General: No acute distress, well-nourished & well-developed  HEAD: normocephalic, atraumatic  EYES: normal conjunctiva, sclera anicteric  EARS: grossly normal hearing  NECK: supple, no thyromegaly  CARDIOVASCULAR: regular rate and rhythm  RESPIRATORY: clear to auscultation bilaterally  GASTROINTESTINAL: soft, nontender, non-distended  MUSCULOSKELETAL: normal gait and station. No gross extremity abnormalities  PSYCHIATRIC: oriented x3, normal mood and affect  RECTUM: No palpable external hemorrhoids, anal fissure, or anal fistula, digital rectal exam deferred for colonoscopy    ASSESSMENT & PLAN  Mr. Pichardo is a 54-year-old gentleman with rectal bleeding, likely hemorrhoidal in nature.  I would recommend we schedule him for a colonoscopy to rule out underlying malignancy, anal fissure, adenomatous colon polyps, etc.  He has been counseled on the risks of colonoscopy which include bleeding, colon perforation, and possible missed pathology.  Despite these risks, he has consented to proceed.    Stefany Hill MD  General, Robotic, and Endoscopic Surgery  [facility]    [address], [address]  [place] [address]  P: [phone]  F: [phone]

## 2024-04-23 NOTE — PROGRESS NOTES
"General Surgery  Initial Office Visit    CC: Rectal bleeding    HPI: The patient is a pleasant 54 y.o. year-old gentleman who presents today for evaluation of intermittent rectal bleeding that has been ongoing for the last 6 months.  He will have painless hematochezia with about 90% of his bowel movements.  Rarely, he will have a nonbloody bowel movement.  He describes the blood as being a bright red blood noticed only on the toilet paper when wiping.  He denies having to strain or struggle with constipation.  He feels as though he has hemorrhoids and has a \"mass\" on the outside of his rectum/anus.  On exam, he only has a couple of tiny external anal skin tags appreciable.  He has a family history of colon cancer in his paternal grandfather.  He has never previously undergone colonoscopic evaluation and has not taken any medication for his hematochezia.    Past Medical History:   Asthma  Anxiety  Hypertension  Hyperlipidemia  Depression with anxiety  History of drug addiction in remission  Erectile dysfunction  Vitamin B12 deficiency    Past Surgical History:   Mandible surgery    Medications:   Albuterol inhaler 1 puff every 4 hours as needed for wheezing  Atorvastatin 20 mg daily  Ciclopirox 8% solution nightly  Ciprofloxacin/dexamethasone eardrops twice daily  Gabapentin 800 mg 4 times daily  Lisinopril 20 mg daily  Sertraline 150 mg daily  Sildenafil 100 mg as needed for erectile dysfunction    Allergies: No known drug allergies    Family History: Paternal grandfather with history of colon cancer or prostate cancer (he is unsure)    Social History: , former smoker but quit in 2023, no regular alcohol use currently, history of opioid abuse and alcohol abuse    ROS:   Constitutional: Negative for fevers or chills  HENT: Negative for hearing loss or runny nose  Eyes: Negative for vision changes or scleral icterus  Respiratory: Negative for cough or shortness of breath  Cardiovascular: Negative for chest pain " or heart palpitations  Gastrointestinal: Positive for hematochezia; negative for abdominal distension, nausea, vomiting, constipation, or melena  Genitourinary: Negative for hematuria or dysuria  Musculoskeletal: Negative for joint swelling or gait instability  Neurologic: Negative for tremors or seizures  Psychiatric: Negative for suicidal ideations or agitation  All other systems reviewed and negative    Physical Exam:  Height: 193 cm  Weight: 93.9 kg  BMI: 25.2  General: No acute distress, well-nourished & well-developed  HEAD: normocephalic, atraumatic  EYES: normal conjunctiva, sclera anicteric  EARS: grossly normal hearing  NECK: supple, no thyromegaly  CARDIOVASCULAR: regular rate and rhythm  RESPIRATORY: clear to auscultation bilaterally  GASTROINTESTINAL: soft, nontender, non-distended  MUSCULOSKELETAL: normal gait and station. No gross extremity abnormalities  PSYCHIATRIC: oriented x3, normal mood and affect  RECTUM: No palpable external hemorrhoids, anal fissure, or anal fistula, digital rectal exam deferred for colonoscopy    ASSESSMENT & PLAN  Mr. Pichardo is a 54-year-old gentleman with rectal bleeding, likely hemorrhoidal in nature.  I would recommend we schedule him for a colonoscopy to rule out underlying malignancy, anal fissure, adenomatous colon polyps, etc.  He has been counseled on the risks of colonoscopy which include bleeding, colon perforation, and possible missed pathology.  Despite these risks, he has consented to proceed.    Stefany Hill MD  General, Robotic, and Endoscopic Surgery  [facility] [place]    [address], [address]  [place] [address]  P: [phone]  F: [phone]

## 2024-04-30 ENCOUNTER — TREATMENT (OUTPATIENT)
Dept: PHYSICAL THERAPY | Facility: CLINIC | Age: 54
End: 2024-04-30
Payer: COMMERCIAL

## 2024-04-30 DIAGNOSIS — M41.20 OTHER IDIOPATHIC SCOLIOSIS, UNSPECIFIED SPINAL REGION: ICD-10-CM

## 2024-04-30 DIAGNOSIS — M51.36 DDD (DEGENERATIVE DISC DISEASE), LUMBAR: Primary | ICD-10-CM

## 2024-04-30 PROCEDURE — 97110 THERAPEUTIC EXERCISES: CPT

## 2024-04-30 PROCEDURE — 97161 PT EVAL LOW COMPLEX 20 MIN: CPT

## 2024-04-30 NOTE — PROGRESS NOTES
Physical Therapy Initial Evaluation and Plan of Care                                               [address], [address]                                                           [place], [place]                                                         [phone]    Patient: Jermaine Pichardo   : 1970  Diagnosis/ICD-10 Code:  DDD (degenerative disc disease), lumbar [M51.36]  Referring practitioner: Neena Sadler MD  Date of Initial Visit: 2024  Today's Date: 2024  Patient seen for 1 sessions           Subjective Questionnaire: Oswestry: 2      Subjective Evaluation    History of Present Illness  Mechanism of injury: Pt denies presence of low back pain or pain in any extremity. He reports that he has noticed a curvature in his lower back for many years which he would like to correct.     X-ray imaging showed mild levoscoliotic curve with facet arthritis.      Patient Occupation: [employer] Quality of life: good    Pain  Current pain ratin  At best pain ratin  At worst pain ratin    Social Support  Lives with: spouse    Diagnostic Tests  X-ray: abnormal    Patient Goals  Patient goals for therapy: increased motion             Objective          Static Posture     Lumbar Spine   Lumbar spine (Left): Convex curve.   Lumbar spine (Right): Concave curve.      Comments   lumbar scoliosis convex L, concave R causing slight elevation of R iliac crest compared to the L        Postural Observations  Seated posture: good  Standing posture: good      Palpation     Additional Palpation Details  No TTP    Neurological Testing     Sensation     Lumbar   Left   Intact: light touch    Right   Intact: light touch    Active Range of Motion   Cervical/Thoracic Spine     Thoracic   Flexion: WFL  Extension: WFL    Lumbar   Flexion: WFL  Extension: WFL    Additional Active Range of Motion Details  Lumbar Percentage:   Flexion: 100%  Extension: 100%  Rotation (R): 75%  Rotation (L): 100%  Lateral flexion  (R): 100%  Lateral flexion (L): 75%    Strength/Myotome Testing     Left Hip   Planes of Motion   Flexion: 4  Extension: 4+  Abduction: 4    Right Hip   Planes of Motion   Flexion: 4  Extension: 4+  Abduction: 4    Left Knee   Flexion: 5  Extension: 5    Right Knee   Flexion: 5  Extension: 5    Left Ankle/Foot   Dorsiflexion: 5  Plantar flexion: 5    Right Ankle/Foot   Dorsiflexion: 5  Plantar flexion: 5    Additional Strength Details  Abdominal MMT: 5/5 (pt able to sit up from supine to long-sitting without UE assist with arms across chest)    Lumbar Flexibility Comments:   Bilateral hamstring impairment  Piriformis with internal rotation R >L    Ambulation     Observational Gait   Gait: within functional limits   Walking speed and stride length within functional limits.           Assessment & Plan       Assessment  Impairments: abnormal or restricted ROM and lacks appropriate home exercise program   Functional limitations: lifting   Assessment details: Jermaine is a 53 y/o male reporting to OP PT for initial evaluation regarding idiopathic scoliosis which he has noticed for many years. He reports noticing a curvature in his lower back for many years without known injury or trauma. X-ray imaging done on 4/4/24 confirmed a mild levoscoliotic curve in his lumbar spine. Jermaine denies back pain or difficulty performing any ADL or recreational activity. He does not currently feel limited by his scoliosis but would like to be educated on any exercises which may assist with decreasing his present curve. Jermaine was educated on the nature of scoliosis and how the curve may not correct itself without more aggressive measures. Upon evaluation, he presents with observed scoliosis in his lumbar spine which is convex left, concave right. Due to this, he also presents with elevation of his right iliac crest. Jermaine also demonstrates decreased lumbar AROM with rotation to the right and lateral flexion to the left. Deficits in bilateral  hip strength and flexibility are also present. Jermaine was educated on and given a HEP to address these deficits and impairments and he is agreeable to attempt independent performance of these stretches and strengthening exercises.   Prognosis: good    Goals  Plan Goals: Short Term: to be met today  1. Pt will be educated on a HEP and demonstrate ability to perform independently  2. Pt will have all questions and concerns addressed regarding his scoliosis diagnosis    Plan  Therapy options: will not be seen for skilled therapy services  Treatment plan discussed with: patient        Manual Therapy:    0     mins  01389;  Therapeutic Exercise:    15     mins  36417;     Neuromuscular Calin:    0    mins  12532;    Therapeutic Activity:     0     mins  76151;     Gait Trainin     mins  76888;     Ultrasound:     0     mins  51639;    Electrical Stimulation:    0     mins  86146 ( );  Dry Needling     0     mins self-pay    Timed Treatment:   15   mins   Total Treatment:     35   mins    PT SIGNATURE: Roldan Sexton PT, DPT  [place] License #: [id number]   Roldan Sexton PT, 24, 3:59 PM EDT  DATE TREATMENT INITIATED: 2024    Initial Certification  Certification Period: 2024  I certify that the therapy services are furnished while this patient is under my care.  The services outlined above are required by this patient, and will be reviewed every 90 days.     PHYSICIAN: Neena Sadler MD      DATE:     Please sign and return via fax to [phone].. Thank you, [facility].

## 2024-05-09 ENCOUNTER — ANESTHESIA (OUTPATIENT)
Dept: GASTROENTEROLOGY | Facility: HOSPITAL | Age: 54
End: 2024-05-09
Payer: COMMERCIAL

## 2024-05-09 ENCOUNTER — ANESTHESIA EVENT (OUTPATIENT)
Dept: GASTROENTEROLOGY | Facility: HOSPITAL | Age: 54
End: 2024-05-09
Payer: COMMERCIAL

## 2024-05-09 ENCOUNTER — HOSPITAL ENCOUNTER (OUTPATIENT)
Facility: HOSPITAL | Age: 54
Setting detail: HOSPITAL OUTPATIENT SURGERY
Discharge: HOME | End: 2024-05-09
Attending: SURGERY | Admitting: SURGERY
Payer: COMMERCIAL

## 2024-05-09 VITALS
WEIGHT: 199 LBS | OXYGEN SATURATION: 98 % | RESPIRATION RATE: 16 BRPM | SYSTOLIC BLOOD PRESSURE: 124 MMHG | DIASTOLIC BLOOD PRESSURE: 70 MMHG | HEIGHT: 76 IN | BODY MASS INDEX: 24.23 KG/M2 | HEART RATE: 49 BPM

## 2024-05-09 DIAGNOSIS — K62.5 RECTAL BLEEDING: ICD-10-CM

## 2024-05-09 DIAGNOSIS — Z80.0 FAMILY HISTORY OF COLON CANCER: ICD-10-CM

## 2024-05-09 DIAGNOSIS — Z12.11 SCREENING FOR COLON CANCER: ICD-10-CM

## 2024-05-09 DIAGNOSIS — I44.7 LEFT BUNDLE BRANCH BLOCK: Primary | ICD-10-CM

## 2024-05-09 PROBLEM — K63.5 COLON POLYPS: Status: ACTIVE | Noted: 2024-05-09

## 2024-05-09 PROBLEM — K64.8 INTERNAL HEMORRHOIDS: Status: ACTIVE | Noted: 2024-05-09

## 2024-05-09 PROCEDURE — 88305 TISSUE EXAM BY PATHOLOGIST: CPT | Performed by: SURGERY

## 2024-05-09 PROCEDURE — 99204 OFFICE O/P NEW MOD 45 MIN: CPT | Performed by: INTERNAL MEDICINE

## 2024-05-09 PROCEDURE — 93005 ELECTROCARDIOGRAM TRACING: CPT | Performed by: ANESTHESIOLOGY

## 2024-05-09 PROCEDURE — 25010000002 PROPOFOL 1000 MG/100ML EMULSION: Performed by: NURSE ANESTHETIST, CERTIFIED REGISTERED

## 2024-05-09 PROCEDURE — 45385 COLONOSCOPY W/LESION REMOVAL: CPT | Performed by: SURGERY

## 2024-05-09 PROCEDURE — 25810000003 LACTATED RINGERS PER 1000 ML: Performed by: SURGERY

## 2024-05-09 PROCEDURE — 93010 ELECTROCARDIOGRAM REPORT: CPT | Performed by: INTERNAL MEDICINE

## 2024-05-09 PROCEDURE — 45380 COLONOSCOPY AND BIOPSY: CPT | Performed by: SURGERY

## 2024-05-09 RX ORDER — LIDOCAINE HYDROCHLORIDE 20 MG/ML
INJECTION, SOLUTION INFILTRATION; PERINEURAL AS NEEDED
Status: DISCONTINUED | OUTPATIENT
Start: 2024-05-09 | End: 2024-05-09 | Stop reason: SURG

## 2024-05-09 RX ORDER — SODIUM CHLORIDE, SODIUM LACTATE, POTASSIUM CHLORIDE, CALCIUM CHLORIDE 600; 310; 30; 20 MG/100ML; MG/100ML; MG/100ML; MG/100ML
1000 INJECTION, SOLUTION INTRAVENOUS CONTINUOUS
Status: DISCONTINUED | OUTPATIENT
Start: 2024-05-09 | End: 2024-05-09 | Stop reason: HOSPADM

## 2024-05-09 RX ORDER — SODIUM CHLORIDE 0.9 % (FLUSH) 0.9 %
10 SYRINGE (ML) INJECTION AS NEEDED
Status: DISCONTINUED | OUTPATIENT
Start: 2024-05-09 | End: 2024-05-09 | Stop reason: HOSPADM

## 2024-05-09 RX ORDER — HYDROCORTISONE 25 MG/G
CREAM TOPICAL 2 TIMES DAILY PRN
Qty: 30 G | Refills: 1 | Status: SHIPPED | OUTPATIENT
Start: 2024-05-09

## 2024-05-09 RX ORDER — MIDAZOLAM HYDROCHLORIDE 1 MG/ML
1 INJECTION INTRAMUSCULAR; INTRAVENOUS
Status: CANCELLED | OUTPATIENT
Start: 2024-05-09

## 2024-05-09 RX ORDER — PROPOFOL 10 MG/ML
INJECTION, EMULSION INTRAVENOUS AS NEEDED
Status: DISCONTINUED | OUTPATIENT
Start: 2024-05-09 | End: 2024-05-09 | Stop reason: SURG

## 2024-05-09 RX ADMIN — PROPOFOL INJECTABLE EMULSION 100 MG: 10 INJECTION, EMULSION INTRAVENOUS at 12:09

## 2024-05-09 RX ADMIN — SODIUM CHLORIDE, POTASSIUM CHLORIDE, SODIUM LACTATE AND CALCIUM CHLORIDE 1000 ML: 600; 310; 30; 20 INJECTION, SOLUTION INTRAVENOUS at 10:53

## 2024-05-09 RX ADMIN — LIDOCAINE HYDROCHLORIDE 100 MG: 20 INJECTION, SOLUTION INFILTRATION; PERINEURAL at 12:10

## 2024-05-09 RX ADMIN — PROPOFOL INJECTABLE EMULSION 160 MCG/KG/MIN: 10 INJECTION, EMULSION INTRAVENOUS at 12:10

## 2024-05-09 NOTE — INTERVAL H&P NOTE
H&P reviewed. The patient was examined and there are no changes to the H&P.      Addendum: While in preop, his telemetry monitor showed a wide-complex QRS so an EKG was performed and demonstrated a new left bundle branch block.  Dr. Vale with anesthesia was concerned about these new findings and consulted cardiology.  Dr. Jose St was gracious enough to come down and evaluate the patient and has deemed him low risk for any cardiac issues during colonoscopy and cleared us to proceed with the planned procedure today.  He will require outpatient follow-up with echocardiogram in the next few weeks and Dr. Simon's office will make those arrangements.

## 2024-05-09 NOTE — OP NOTE
Operative Note :  Stefany Hill MD      Jermaine Pichardo  1970    Procedure Date: 05/09/24    Pre-op Diagnosis:  Rectal bleeding [K62.5]  Screening for colon cancer [Z12.11]  Family history of colon cancer [Z80.0]    Post-Operative Diagnosis:  Colon polyps  Internal hemorrhoids  Diverticulosis    Procedure:   Flexible colonoscopy to the cecum with hot snare polypectomy and cold forcep polypectomy x 2    Surgeon: Stefany Hill MD    Assistant: None    Anesthesia:  MAC (monitored anesthetic care)    Estimated Blood Loss: Minimal    Specimens:   Cecal polyp  Rectal polyps x 2    Complications: None    Indications:  The patient is a 54-year-old gentleman who has struggled with persistent rectal bleeding of 6 months duration.  I have recommended proceeding with colonoscopy today.  He has been counseled on the risks of the procedure which include bleeding, colon perforation, and possible missed pathology.  Despite these risks, he has consented to proceed.    Findings: Nonbleeding grade 1 internal hemorrhoids, 3 sessile colon/rectal polyps removed, single sigmoid diverticula without evidence of bleeding    Description of procedure:  The patient was brought to the endoscopy suite and hasmukh in the left lateral decubitus position.  Continuous propofol anesthesia was administered.  A surgical timeout was completed.  A digital rectal exam was performed, revealing no abnormalities.  An adult colonoscope was then inserted through the anus and passed under direct visualization to the level of the cecum.  The cecum was identified via the ileocecal valve as well as the appendiceal orifice.  The scope was then slowly withdrawn, examining all circumferential walls of the ascending, transverse, descending, and sigmoid colon.  He had a 4 mm polyp of the cecum adjacent to the appendiceal orifice.  This was removed using the hot snare and retrieved.  Within the sigmoid colon there was a single tiny diverticula with no evidence  of bleeding or fecal impaction.  Within the rectum there were 2 separate 2 mm hyperplastic appearing polyps that were removed using the biopsy forceps.  Lastly the scope was retroflexed within the rectum and demonstrated circumferential grade 1 internal hemorrhoids.  The scope was then withdrawn and the colon desufflated.  The patient had a very good bowel prep and was transferred to the recovery area in stable condition.     Recommendations:  I will call the patient in 1 week or less with the pathology results of the 3 polyps removed as this will determine when the next screening colonoscopy will be due.    Stefany Hill MD  General, Robotic, and Endoscopic Surgery  [facility]    [address], [address]  [place] [address]  P: [phone]  F: [phone]

## 2024-05-09 NOTE — DISCHARGE INSTRUCTIONS
For the next 24 hours patient needs to be with a responsible adult.    For 24 hours DO NOT drive, operate machinery, appliances, drink alcohol, make important decisions or sign legal documents.    Start with a light or bland diet if you are feeling sick to your stomach otherwise advance to regular diet as tolerated.    Follow recommendations on procedure report if provided by your doctor.    Call Dr Hill for problems [phone]    Problems may include but not limited to: large amounts of bleeding, trouble breathing, repeated vomiting, severe unrelieved pain, fever or chills.

## 2024-05-09 NOTE — CONSULTS
Date of Hospital Visit: 24  Encounter Provider: Jose St MD  Place of Service: [facility] CARDIOLOGY  Patient Name: Jermaine Pichardo  :1970  Referral Provider: Stefany Hill MD    Chief complaint: rectal bleeding    History of Present Illness    Mr. Pichardo is a 53yo man with HTN (for which he takes lisinopril) and hyerlipidemia (he was recently started on a statin).  He denies any history of diabetes.  He denies any history of heart disease in his first-degree relatives.  He is a former cigarette smoker.  He is physically active and denies any cardiac limitations.  He denies chest pain, shortness of breath, lightheadedness, or syncope.  He sometimes feels his heart pound if he is anxious, but he is not really having palpitations, per se.    He had an EKG a few years ago that was unremarkable.  He came in today for a semi-elective colonoscopy as he has had some hematochezia.  While hooked to the telemetry monitor, he was noted to have a wide-complex QRS, and an EKG showed a left bundle branch block.      Past Medical History:   Diagnosis Date    Actinic keratosis     Anxiety     Asthma     Hemorrhoids     Hyperlipidemia     Hypertension     Left bundle branch block (LBBB)        Past Surgical History:   Procedure Laterality Date    MANDIBLE SURGERY         Prior to Admission medications    Medication Sig Start Date End Date Taking? Authorizing Provider   atorvastatin (LIPITOR) 20 MG tablet Take 1 tablet by mouth Daily. 24  Yes Neena Sadler MD   ciclopirox (PENLAC) 8 % solution Apply  topically to the appropriate area as directed Every Night. 24  Yes Neena Sadler MD   ciprofloxacin-dexAMETHasone (Ciprodex) 0.3-0.1 % otic suspension Administer 4 drops into both ears 2 (Two) Times a Day. 24  Yes Neena Sadler MD   gabapentin (NEURONTIN) 800 MG tablet Take 1 tablet by mouth 4 (Four) Times a Day.  Patient taking differently: Take 1 tablet by  "mouth 4 (Four) Times a Day. \"TAKES FOR ANXIETY\" 4/15/24  Yes Neena Sadler MD   lisinopril (PRINIVIL,ZESTRIL) 20 MG tablet Take 0.5 tablets by mouth Daily. 24  Yes Renée Steve MD   sertraline (ZOLOFT) 100 MG tablet TAKE 1 AND 1/2 TABLETS BY MOUTH EVERY DAY 3/30/23  Yes Neena Sadler MD   sildenafil (Viagra) 100 MG tablet Take 1 tablet by mouth Daily As Needed for Erectile Dysfunction. 24  Yes Neena Sadler MD   albuterol sulfate  (90 Base) MCG/ACT inhaler Inhale 1 puff Every 4 (Four) Hours As Needed for Wheezing. 22   Neena Sadler MD       Social History     Socioeconomic History    Marital status:     Number of children: 3   Tobacco Use    Smoking status: Former     Current packs/day: 0.00     Average packs/day: 1 pack/day for 17.0 years (17.0 ttl pk-yrs)     Types: Cigarettes     Start date: 2006     Quit date: 2023     Years since quittin.3    Smokeless tobacco: Current     Types: Snuff    Tobacco comments:     started at age 41   Vaping Use    Vaping status: Former    Substances: Nicotine   Substance and Sexual Activity    Alcohol use: Not Currently     Comment: QUIT     Drug use: No    Sexual activity: Defer       Family History   Problem Relation Age of Onset    Diabetes Mother     Colon cancer Paternal Grandfather     Malig Hyperthermia Neg Hx        Review of Systems   Gastrointestinal:  Positive for blood in stool.   All other systems reviewed and are negative.       Objective:     Vitals:    24 1044   BP: 134/83   BP Location: Left arm   Patient Position: Lying   Pulse: 65   Resp: 16   SpO2: 96%   Weight: 90.3 kg (199 lb)   Height: 193 cm (76\")     Body mass index is 24.22 kg/m².    Last Weight and Admission Weight        24  1044   Weight: 90.3 kg (199 lb)     Flowsheet Rows      Flowsheet Row First Filed Value   Admission Height 193 cm (76\") Documented at 2024 1044   Admission Weight 90.3 kg (199 lb) Documented " at 05/09/2024 1044            No intake or output data in the 24 hours ending 05/09/24 1212      Physical Exam  Vitals reviewed.   Constitutional:       Appearance: He is well-developed.   HENT:      Head: Normocephalic.      Nose: Nose normal.      Mouth/Throat:      Pharynx: Oropharynx is clear.   Eyes:      Conjunctiva/sclera: Conjunctivae normal.   Neck:      Vascular: No JVD.   Cardiovascular:      Rate and Rhythm: Normal rate and regular rhythm.      Pulses: Normal pulses.      Heart sounds: Normal heart sounds.   Pulmonary:      Effort: Pulmonary effort is normal.      Breath sounds: Normal breath sounds.   Abdominal:      Palpations: Abdomen is soft.      Tenderness: There is no abdominal tenderness.   Musculoskeletal:         General: No swelling. Normal range of motion.      Cervical back: Normal range of motion.   Skin:     General: Skin is warm and dry.   Neurological:      General: No focal deficit present.      Mental Status: He is alert.   Psychiatric:         Mood and Affect: Mood normal.                 Lab Review:       I personally viewed and interpreted the patient's EKG/Telemetry data    Assessment/Plan:     1. LBBB  2. HTN  3. Hyperlipidemia  4. Rectal bleeding    Mr Pichardo was incidentally noted to have a left renal branch block on an EKG today.  He has excellent functional status and denies any worrisome cardiac complaints at all.  His cardiac exam is unremarkable.    The ACC perioperative risk management guidelines consider endoscopy to be a very low risk procedure, and an abnormal EKG with normal functional status is not a reason to prevent the procedure from occurring.  Also, I do not consider this to be truly elective as he is having some rectal bleeding and has a family history of colon cancer.    He will need outpatient testing for a new diagnosis of a left bundle branch block, which is something that we routinely do in the outpatient setting.  We will call him to arrange future  testing.

## 2024-05-10 ENCOUNTER — TELEPHONE (OUTPATIENT)
Dept: SURGERY | Facility: CLINIC | Age: 54
End: 2024-05-10
Payer: COMMERCIAL

## 2024-05-10 LAB
LAB AP CASE REPORT: NORMAL
PATH REPORT.FINAL DX SPEC: NORMAL
PATH REPORT.GROSS SPEC: NORMAL
QT INTERVAL: 489 MS
QTC INTERVAL: 489 MS

## 2024-05-14 RX ORDER — CICLOPIROX 80 MG/ML
SOLUTION TOPICAL NIGHTLY
Qty: 6 ML | Refills: 11 | Status: SHIPPED | OUTPATIENT
Start: 2024-05-14

## 2024-05-16 RX ORDER — SERTRALINE HYDROCHLORIDE 100 MG/1
200 TABLET, FILM COATED ORAL DAILY
Qty: 180 TABLET | Refills: 3 | Status: SHIPPED | OUTPATIENT
Start: 2024-05-16

## 2024-05-17 ENCOUNTER — TELEPHONE (OUTPATIENT)
Dept: CARDIOLOGY | Facility: CLINIC | Age: 54
End: 2024-05-17

## 2024-05-17 ENCOUNTER — HOSPITAL ENCOUNTER (OUTPATIENT)
Dept: CARDIOLOGY | Facility: HOSPITAL | Age: 54
Discharge: HOME | End: 2024-05-17
Payer: COMMERCIAL

## 2024-05-17 VITALS
HEART RATE: 60 BPM | SYSTOLIC BLOOD PRESSURE: 130 MMHG | BODY MASS INDEX: 24.23 KG/M2 | WEIGHT: 199 LBS | HEIGHT: 76 IN | DIASTOLIC BLOOD PRESSURE: 80 MMHG

## 2024-05-17 DIAGNOSIS — I44.7 LEFT BUNDLE BRANCH BLOCK: ICD-10-CM

## 2024-05-17 LAB
AORTIC ARCH: 2.9 CM
ASCENDING AORTA: 3.2 CM
BH CV ECHO MEAS - ACS: 2.34 CM
BH CV ECHO MEAS - AO MAX PG: 9.2 MMHG
BH CV ECHO MEAS - AO MEAN PG: 5 MMHG
BH CV ECHO MEAS - AO ROOT DIAM: 3.4 CM
BH CV ECHO MEAS - AO V2 MAX: 152 CM/SEC
BH CV ECHO MEAS - AO V2 VTI: 33.4 CM
BH CV ECHO MEAS - AVA(I,D): 2.6 CM2
BH CV ECHO MEAS - EDV(CUBED): 195.1 ML
BH CV ECHO MEAS - EDV(MOD-SP2): 152 ML
BH CV ECHO MEAS - EDV(MOD-SP4): 139 ML
BH CV ECHO MEAS - EF(MOD-BP): 57 %
BH CV ECHO MEAS - EF(MOD-SP2): 57.2 %
BH CV ECHO MEAS - EF(MOD-SP4): 55.4 %
BH CV ECHO MEAS - ESV(CUBED): 66.4 ML
BH CV ECHO MEAS - ESV(MOD-SP2): 65 ML
BH CV ECHO MEAS - ESV(MOD-SP4): 62 ML
BH CV ECHO MEAS - FS: 30.2 %
BH CV ECHO MEAS - IVS/LVPW: 1.13 CM
BH CV ECHO MEAS - IVSD: 0.9 CM
BH CV ECHO MEAS - LAT PEAK E' VEL: 10.2 CM/SEC
BH CV ECHO MEAS - LV DIASTOLIC VOL/BSA (35-75): 62.9 CM2
BH CV ECHO MEAS - LV MASS(C)D: 189.3 GRAMS
BH CV ECHO MEAS - LV MAX PG: 4.2 MMHG
BH CV ECHO MEAS - LV MEAN PG: 2 MMHG
BH CV ECHO MEAS - LV SYSTOLIC VOL/BSA (12-30): 28 CM2
BH CV ECHO MEAS - LV V1 MAX: 102 CM/SEC
BH CV ECHO MEAS - LV V1 VTI: 23.3 CM
BH CV ECHO MEAS - LVIDD: 5.8 CM
BH CV ECHO MEAS - LVIDS: 4 CM
BH CV ECHO MEAS - LVOT AREA: 3.7 CM2
BH CV ECHO MEAS - LVOT DIAM: 2.18 CM
BH CV ECHO MEAS - LVPWD: 0.8 CM
BH CV ECHO MEAS - MED PEAK E' VEL: 7.6 CM/SEC
BH CV ECHO MEAS - MR MAX PG: 95.9 MMHG
BH CV ECHO MEAS - MR MAX VEL: 489.6 CM/SEC
BH CV ECHO MEAS - MV A DUR: 0.13 SEC
BH CV ECHO MEAS - MV A MAX VEL: 57.4 CM/SEC
BH CV ECHO MEAS - MV DEC SLOPE: 299 CM/SEC2
BH CV ECHO MEAS - MV DEC TIME: 0.22 SEC
BH CV ECHO MEAS - MV E MAX VEL: 80.6 CM/SEC
BH CV ECHO MEAS - MV E/A: 1.4
BH CV ECHO MEAS - MV MAX PG: 3.3 MMHG
BH CV ECHO MEAS - MV MEAN PG: 1.37 MMHG
BH CV ECHO MEAS - MV P1/2T: 87.7 MSEC
BH CV ECHO MEAS - MV V2 VTI: 35.4 CM
BH CV ECHO MEAS - MVA(P1/2T): 2.5 CM2
BH CV ECHO MEAS - MVA(VTI): 2.44 CM2
BH CV ECHO MEAS - PA ACC TIME: 0.12 SEC
BH CV ECHO MEAS - PA V2 MAX: 122.2 CM/SEC
BH CV ECHO MEAS - PULM A REVS DUR: 0.11 SEC
BH CV ECHO MEAS - PULM A REVS VEL: 24.3 CM/SEC
BH CV ECHO MEAS - PULM DIAS VEL: 52.4 CM/SEC
BH CV ECHO MEAS - PULM S/D: 1.15
BH CV ECHO MEAS - PULM SYS VEL: 60 CM/SEC
BH CV ECHO MEAS - QP/QS: 0.54
BH CV ECHO MEAS - RAP SYSTOLE: 3 MMHG
BH CV ECHO MEAS - RV MAX PG: 1.59 MMHG
BH CV ECHO MEAS - RV V1 MAX: 63 CM/SEC
BH CV ECHO MEAS - RV V1 VTI: 13.8 CM
BH CV ECHO MEAS - RVOT DIAM: 2.09 CM
BH CV ECHO MEAS - RVSP: 32.7 MMHG
BH CV ECHO MEAS - SUP REN AO DIAM: 1.9 CM
BH CV ECHO MEAS - SV(LVOT): 86.7 ML
BH CV ECHO MEAS - SV(MOD-SP2): 87 ML
BH CV ECHO MEAS - SV(MOD-SP4): 77 ML
BH CV ECHO MEAS - SV(RVOT): 47.1 ML
BH CV ECHO MEAS - SVI(LVOT): 39.2 ML/M2
BH CV ECHO MEAS - SVI(MOD-SP2): 39.4 ML/M2
BH CV ECHO MEAS - SVI(MOD-SP4): 34.8 ML/M2
BH CV ECHO MEAS - TAPSE (>1.6): 1.87 CM
BH CV ECHO MEAS - TR MAX PG: 29.7 MMHG
BH CV ECHO MEAS - TR MAX VEL: 272.4 CM/SEC
BH CV ECHO MEASUREMENTS AVERAGE E/E' RATIO: 9.06
BH CV XLRA - RV BASE: 3.4 CM
BH CV XLRA - RV LENGTH: 7.5 CM
BH CV XLRA - RV MID: 2.6 CM
BH CV XLRA - TDI S': 13.2 CM/SEC
LEFT ATRIUM VOLUME INDEX: 24 ML/M2
LV EF 2D ECHO EST: 50 %
SINUS: 3.1 CM
STJ: 2.6 CM

## 2024-05-17 PROCEDURE — 93306 TTE W/DOPPLER COMPLETE: CPT

## 2024-05-17 NOTE — TELEPHONE ENCOUNTER
Scheduling -- please arrange Malia in office, TY!  lorraine    I called and spoke with him.  He made it through the colonoscopy fine and he continues to feel very well.  He has a left bundle branch block and his echo shows borderline left ventricular dilation and low normal left ventricular systolic function, ejection fraction 50%.  Interestingly, he does not have a lot of dyssynchrony.  I would like to get a perfusion stress test to start and then I will bring him back to the office to discuss further steps.

## 2024-05-28 ENCOUNTER — TELEPHONE (OUTPATIENT)
Dept: CARDIOLOGY | Facility: CLINIC | Age: 54
End: 2024-05-28

## 2024-05-28 NOTE — TELEPHONE ENCOUNTER
Caller: Jermaine Pichardo    Relationship: Self    Best call back number: [phone]     What orders are you requesting (i.e. lab or imaging): STRESS TEST     In what timeframe would the patient need to come in: ASAP    Where will you receive your lab/imaging services: [facility]    Additional notes:  PT WAS CONTACTED BY MD TO GO OVER ECHO RESULTS AND TOLD TO WATCH FOR CALL BACK FOR A STRESS TEST - PT AND WIFE FOLLOWING UP AS THEY HAVE NOT HEARD ANYTHING AND NO ORDER FOR TEST SHOWING - PT IS ASKING IF POSSIBLE TO SCHEDULE FIRST THING IN THE MORNING DUE TO WORK SCHEDULE OR 4PM OR LATER IF POSSIBLE

## 2024-06-10 ENCOUNTER — TELEPHONE (OUTPATIENT)
Dept: CARDIOLOGY | Facility: CLINIC | Age: 54
End: 2024-06-10
Payer: COMMERCIAL

## 2024-06-11 ENCOUNTER — HOSPITAL ENCOUNTER (OUTPATIENT)
Dept: CARDIOLOGY | Facility: HOSPITAL | Age: 54
Discharge: HOME | End: 2024-06-11
Payer: COMMERCIAL

## 2024-06-11 VITALS — BODY MASS INDEX: 24.24 KG/M2 | WEIGHT: 199.08 LBS | HEIGHT: 76 IN

## 2024-06-11 DIAGNOSIS — I44.7 LEFT BUNDLE BRANCH BLOCK: ICD-10-CM

## 2024-06-11 LAB
BH CV NUCLEAR PRIOR STUDY: 2
BH CV REST NUCLEAR ISOTOPE DOSE: 10.2 MCI
BH CV STRESS BP STAGE 1: NORMAL
BH CV STRESS COMMENTS STAGE 1: NORMAL
BH CV STRESS DOSE REGADENOSON STAGE 1: 0.4
BH CV STRESS DURATION MIN STAGE 1: 0
BH CV STRESS DURATION SEC STAGE 1: 10
BH CV STRESS HR STAGE 1: 74
BH CV STRESS NUCLEAR ISOTOPE DOSE: 35.6 MCI
BH CV STRESS PROTOCOL 1: NORMAL
BH CV STRESS RECOVERY BP: NORMAL MMHG
BH CV STRESS RECOVERY HR: 58 BPM
BH CV STRESS STAGE 1: 1
LV EF NUC BP: 47 %
MAXIMAL PREDICTED HEART RATE: 167 BPM
PERCENT MAX PREDICTED HR: 44.31 %
STRESS BASELINE BP: NORMAL MMHG
STRESS BASELINE HR: 52 BPM
STRESS PERCENT HR: 52 %
STRESS POST EXERCISE DUR SEC: 10 SEC
STRESS POST PEAK BP: NORMAL MMHG
STRESS POST PEAK HR: 74 BPM
STRESS TARGET HR: 142 BPM

## 2024-06-11 PROCEDURE — 78452 HT MUSCLE IMAGE SPECT MULT: CPT | Performed by: INTERNAL MEDICINE

## 2024-06-11 PROCEDURE — 93018 CV STRESS TEST I&R ONLY: CPT | Performed by: INTERNAL MEDICINE

## 2024-06-11 PROCEDURE — 93016 CV STRESS TEST SUPVJ ONLY: CPT | Performed by: INTERNAL MEDICINE

## 2024-06-11 PROCEDURE — 78452 HT MUSCLE IMAGE SPECT MULT: CPT

## 2024-06-11 PROCEDURE — 0 TECHNETIUM TETROFOSMIN KIT: Performed by: INTERNAL MEDICINE

## 2024-06-11 PROCEDURE — 93017 CV STRESS TEST TRACING ONLY: CPT

## 2024-06-11 PROCEDURE — A9502 TC99M TETROFOSMIN: HCPCS | Performed by: INTERNAL MEDICINE

## 2024-06-11 PROCEDURE — 25010000002 REGADENOSON 0.4 MG/5ML SOLUTION: Performed by: INTERNAL MEDICINE

## 2024-06-11 RX ORDER — REGADENOSON 0.08 MG/ML
0.4 INJECTION, SOLUTION INTRAVENOUS
Status: COMPLETED | OUTPATIENT
Start: 2024-06-11 | End: 2024-06-11

## 2024-06-11 RX ADMIN — TETROFOSMIN 1 DOSE: 1.38 INJECTION, POWDER, LYOPHILIZED, FOR SOLUTION INTRAVENOUS at 08:30

## 2024-06-11 RX ADMIN — REGADENOSON 0.4 MG: 0.08 INJECTION, SOLUTION INTRAVENOUS at 08:30

## 2024-06-11 RX ADMIN — TETROFOSMIN 1 DOSE: 1.38 INJECTION, POWDER, LYOPHILIZED, FOR SOLUTION INTRAVENOUS at 07:45

## 2024-06-11 NOTE — PROGRESS NOTES
Scheduling, please arrange 6m f/u with me.    Ty  jdk    I personally reviewed images and see no MI/ischemia. EF is low normal, due to LBBB. I will follow him serially in the office. He's on an ACE.    CC'd to Dr Sadler to keep her in the loop.

## 2024-06-17 DIAGNOSIS — F41.8 DEPRESSION WITH ANXIETY: ICD-10-CM

## 2024-06-17 RX ORDER — GABAPENTIN 800 MG/1
800 TABLET ORAL 4 TIMES DAILY
Qty: 120 TABLET | Refills: 0 | Status: SHIPPED | OUTPATIENT
Start: 2024-06-17

## 2024-06-17 NOTE — TELEPHONE ENCOUNTER
Caller: Jermaine Pichardo    Relationship: Self    Best call back number: [phone]    Requested Prescriptions:   Requested Prescriptions     Pending Prescriptions Disp Refills    gabapentin (NEURONTIN) 800 MG tablet 120 tablet 5     Sig: Take 1 tablet by mouth 4 (Four) Times a Day.      Pharmacy where request should be sent: [facility] #[id number] - [place], [place] - [address] AT [facility] - [phone] [facility] [phone] FX     Last office visit with prescribing clinician: 4/12/2024   Last telemedicine visit with prescribing clinician: Visit date not found   Next office visit with prescribing clinician: 10/8/2024     Additional details provided by patient: PT HAS 2 DAYS LEFT OF MEDICATION.     Does the patient have less than a 3 day supply:  [x] Yes  [] No    Would you like a call back once the refill request has been completed: [] Yes [x] No    Erik Finch Rep   06/17/24 10:02 EDT

## 2024-06-28 ENCOUNTER — DOCUMENTATION (OUTPATIENT)
Dept: PHYSICAL THERAPY | Facility: CLINIC | Age: 54
End: 2024-06-28
Payer: COMMERCIAL

## 2024-07-15 DIAGNOSIS — F41.8 DEPRESSION WITH ANXIETY: ICD-10-CM

## 2024-07-15 RX ORDER — GABAPENTIN 800 MG/1
800 TABLET ORAL 4 TIMES DAILY
Qty: 120 TABLET | Refills: 5 | Status: SHIPPED | OUTPATIENT
Start: 2024-07-15

## 2024-07-30 ENCOUNTER — OFFICE VISIT (OUTPATIENT)
Dept: INTERNAL MEDICINE | Facility: CLINIC | Age: 54
End: 2024-07-30
Payer: COMMERCIAL

## 2024-07-30 VITALS
SYSTOLIC BLOOD PRESSURE: 120 MMHG | DIASTOLIC BLOOD PRESSURE: 82 MMHG | HEART RATE: 77 BPM | BODY MASS INDEX: 25.33 KG/M2 | WEIGHT: 208 LBS | HEIGHT: 76 IN | OXYGEN SATURATION: 98 %

## 2024-07-30 DIAGNOSIS — F41.8 DEPRESSION WITH ANXIETY: Primary | ICD-10-CM

## 2024-07-30 PROCEDURE — 99214 OFFICE O/P EST MOD 30 MIN: CPT | Performed by: INTERNAL MEDICINE

## 2024-07-30 RX ORDER — SILDENAFIL 100 MG/1
100 TABLET, FILM COATED ORAL DAILY PRN
Qty: 30 TABLET | Refills: 0 | Status: SHIPPED | OUTPATIENT
Start: 2024-07-30

## 2024-07-30 RX ORDER — DULOXETIN HYDROCHLORIDE 30 MG/1
30 CAPSULE, DELAYED RELEASE ORAL DAILY
Qty: 7 CAPSULE | Refills: 0 | Status: SHIPPED | OUTPATIENT
Start: 2024-07-30

## 2024-07-30 RX ORDER — DULOXETIN HYDROCHLORIDE 60 MG/1
60 CAPSULE, DELAYED RELEASE ORAL DAILY
Qty: 30 CAPSULE | Refills: 11 | Status: SHIPPED | OUTPATIENT
Start: 2024-07-30

## 2024-07-30 NOTE — PROGRESS NOTES
Subjective     Jermaine Pichardo is a 54 y.o. male who presents with   Chief Complaint   Patient presents with    Discuss new antidepression medication       History of Present Illness     Depression with anxiety.  Not optimal control on both depression and anxiety.  Particularly in social situations.     Review of Systems   Respiratory: Negative.     Cardiovascular: Negative.        The following portions of the patient's history were reviewed and updated as appropriate: allergies, current medications and problem list.    Patient Active Problem List    Diagnosis Date Noted    Internal hemorrhoids 05/09/2024    Colon polyps 05/09/2024    Rectal bleeding 04/23/2024    Screening for colon cancer 04/23/2024    Family history of colon cancer 04/23/2024    Drug addiction in remission 04/12/2023    Hyperlipidemia 04/08/2022    Depression with anxiety 01/08/2019    DDD (degenerative disc disease), lumbar 09/14/2018    Withdrawal from opioids 07/27/2017    Low vitamin B12 level 02/14/2017    Asthma 02/11/2016     Note Last Updated: 2/11/2016     Impression: 10/27/2015 - This is not well controlled.  He is smoking again and knows that he needs to quit.  I have really encouraged him to do so.  I am prescribing him Singulair.  Impression: 04/29/2014 - Refill inhaler.;       Essential hypertension 02/11/2016    Other insomnia 02/11/2016       Current Outpatient Medications on File Prior to Visit   Medication Sig Dispense Refill    albuterol sulfate  (90 Base) MCG/ACT inhaler Inhale 1 puff Every 4 (Four) Hours As Needed for Wheezing. 18 g 8    atorvastatin (LIPITOR) 20 MG tablet Take 1 tablet by mouth Daily. 90 tablet 3    ciclopirox (PENLAC) 8 % solution Apply  topically to the appropriate area as directed Every Night. 6 mL 11    ciprofloxacin-dexAMETHasone (Ciprodex) 0.3-0.1 % otic suspension Administer 4 drops into both ears 2 (Two) Times a Day. 7.5 each 0    gabapentin (NEURONTIN) 800 MG tablet Take 1 tablet by mouth 4  "(Four) Times a Day. 120 tablet 5    Hydrocortisone, Perianal, (Anusol-HC) 2.5 % rectal cream Insert  into the rectum 2 (Two) Times a Day As Needed (hemorrhoid bleeding). 30 g 1    lisinopril (PRINIVIL,ZESTRIL) 20 MG tablet Take 0.5 tablets by mouth Daily.      sildenafil (Viagra) 100 MG tablet Take 1 tablet by mouth Daily As Needed for Erectile Dysfunction. 30 tablet 11    [DISCONTINUED] sertraline (ZOLOFT) 100 MG tablet TAKE 2 TABLETS BY MOUTH DAILY 180 tablet 3     No current facility-administered medications on file prior to visit.       Objective     /82   Pulse 77   Ht 193 cm (75.98\")   Wt 94.3 kg (208 lb)   SpO2 98%   BMI 25.33 kg/m²     Physical Exam  Constitutional:       Appearance: He is well-developed.   HENT:      Head: Atraumatic.   Pulmonary:      Effort: Pulmonary effort is normal.   Neurological:      Mental Status: He is alert and oriented to person, place, and time.         Assessment & Plan   Diagnoses and all orders for this visit:    1. Depression with anxiety (Primary)    Other orders  -     DULoxetine (Cymbalta) 30 MG capsule; Take 1 capsule by mouth Daily.  Dispense: 7 capsule; Refill: 0  -     DULoxetine (Cymbalta) 60 MG capsule; Take 1 capsule by mouth Daily.  Dispense: 30 capsule; Refill: 11        Discussion    Depression with anxiety.  Control is inadequate.    Start Cymbalta 30 for one week.  Decrease Zoloft to 100.  Start Cymbalta 60 for one week.  Decrease Zolfot to 50.   Continue Cymbalta 60 mg daily.    F/u as planned.    Discussed with the patient onset on action.  The patient will let me know of any side effects from the medication.      Continue psychiatry referral.         Future Appointments   Date Time Provider Department Center   10/1/2024  8:00 AM [facility]   10/8/2024  9:00 AM Neena Sadler MD [facility]   1/16/2025  9:00 AM Jose St MD [facility]   4/11/2025  8:20 AM [facility] "   4/16/2025  7:45 AM Neena Sadler MD [facility]

## 2024-08-30 RX ORDER — LISINOPRIL 20 MG/1
10 TABLET ORAL DAILY
Qty: 90 TABLET | Refills: 1 | Status: SHIPPED | OUTPATIENT
Start: 2024-08-30

## 2024-09-25 DIAGNOSIS — F41.8 DEPRESSION WITH ANXIETY: ICD-10-CM

## 2024-09-25 RX ORDER — GABAPENTIN 800 MG/1
800 TABLET ORAL 4 TIMES DAILY
Qty: 120 TABLET | Refills: 5 | Status: SHIPPED | OUTPATIENT
Start: 2024-09-25

## 2024-10-01 DIAGNOSIS — E78.5 HYPERLIPIDEMIA, UNSPECIFIED HYPERLIPIDEMIA TYPE: Primary | ICD-10-CM

## 2024-10-01 DIAGNOSIS — I10 ESSENTIAL HYPERTENSION: ICD-10-CM

## 2024-10-01 LAB
ALBUMIN SERPL-MCNC: 4.5 G/DL (ref 3.5–5.2)
ALBUMIN/GLOB SERPL: 2.3 G/DL
ALP SERPL-CCNC: 85 U/L (ref 39–117)
ALT SERPL-CCNC: 15 U/L (ref 1–41)
AST SERPL-CCNC: 16 U/L (ref 1–40)
BASOPHILS # BLD AUTO: 0.08 10*3/MM3 (ref 0–0.2)
BASOPHILS NFR BLD AUTO: 2 % (ref 0–1.5)
BILIRUB SERPL-MCNC: 0.2 MG/DL (ref 0–1.2)
BUN SERPL-MCNC: 13 MG/DL (ref 6–20)
BUN/CREAT SERPL: 14.6 (ref 7–25)
CALCIUM SERPL-MCNC: 9.7 MG/DL (ref 8.6–10.5)
CHLORIDE SERPL-SCNC: 104 MMOL/L (ref 98–107)
CHOLEST SERPL-MCNC: 213 MG/DL (ref 0–200)
CO2 SERPL-SCNC: 27 MMOL/L (ref 22–29)
CREAT SERPL-MCNC: 0.89 MG/DL (ref 0.76–1.27)
EGFRCR SERPLBLD CKD-EPI 2021: 102.5 ML/MIN/1.73
EOSINOPHIL # BLD AUTO: 0.45 10*3/MM3 (ref 0–0.4)
EOSINOPHIL NFR BLD AUTO: 11.5 % (ref 0.3–6.2)
ERYTHROCYTE [DISTWIDTH] IN BLOOD BY AUTOMATED COUNT: 11.8 % (ref 12.3–15.4)
GLOBULIN SER CALC-MCNC: 2 GM/DL
GLUCOSE SERPL-MCNC: 90 MG/DL (ref 65–99)
HCT VFR BLD AUTO: 39.1 % (ref 37.5–51)
HDLC SERPL-MCNC: 45 MG/DL (ref 40–60)
HGB BLD-MCNC: 13.2 G/DL (ref 13–17.7)
IMM GRANULOCYTES # BLD AUTO: 0.01 10*3/MM3 (ref 0–0.05)
IMM GRANULOCYTES NFR BLD AUTO: 0.3 % (ref 0–0.5)
LDLC SERPL CALC-MCNC: 147 MG/DL (ref 0–100)
LYMPHOCYTES # BLD AUTO: 1.43 10*3/MM3 (ref 0.7–3.1)
LYMPHOCYTES NFR BLD AUTO: 36.4 % (ref 19.6–45.3)
MCH RBC QN AUTO: 31.2 PG (ref 26.6–33)
MCHC RBC AUTO-ENTMCNC: 33.8 G/DL (ref 31.5–35.7)
MCV RBC AUTO: 92.4 FL (ref 79–97)
MONOCYTES # BLD AUTO: 0.48 10*3/MM3 (ref 0.1–0.9)
MONOCYTES NFR BLD AUTO: 12.2 % (ref 5–12)
NEUTROPHILS # BLD AUTO: 1.48 10*3/MM3 (ref 1.7–7)
NEUTROPHILS NFR BLD AUTO: 37.6 % (ref 42.7–76)
NRBC BLD AUTO-RTO: 0 /100 WBC (ref 0–0.2)
PLATELET # BLD AUTO: 334 10*3/MM3 (ref 140–450)
POTASSIUM SERPL-SCNC: 4.3 MMOL/L (ref 3.5–5.2)
PROT SERPL-MCNC: 6.5 G/DL (ref 6–8.5)
RBC # BLD AUTO: 4.23 10*6/MM3 (ref 4.14–5.8)
SODIUM SERPL-SCNC: 143 MMOL/L (ref 136–145)
TRIGL SERPL-MCNC: 119 MG/DL (ref 0–150)
VLDLC SERPL CALC-MCNC: 21 MG/DL (ref 5–40)
WBC # BLD AUTO: 3.93 10*3/MM3 (ref 3.4–10.8)

## 2024-10-08 ENCOUNTER — OFFICE VISIT (OUTPATIENT)
Dept: INTERNAL MEDICINE | Facility: CLINIC | Age: 54
End: 2024-10-08
Payer: COMMERCIAL

## 2024-10-08 VITALS
SYSTOLIC BLOOD PRESSURE: 148 MMHG | HEART RATE: 60 BPM | BODY MASS INDEX: 24.96 KG/M2 | WEIGHT: 205 LBS | HEIGHT: 76 IN | OXYGEN SATURATION: 98 % | DIASTOLIC BLOOD PRESSURE: 90 MMHG

## 2024-10-08 DIAGNOSIS — F41.8 DEPRESSION WITH ANXIETY: Primary | ICD-10-CM

## 2024-10-08 DIAGNOSIS — E78.5 HYPERLIPIDEMIA, UNSPECIFIED HYPERLIPIDEMIA TYPE: ICD-10-CM

## 2024-10-08 DIAGNOSIS — I10 ESSENTIAL HYPERTENSION: ICD-10-CM

## 2024-10-08 PROCEDURE — 99214 OFFICE O/P EST MOD 30 MIN: CPT | Performed by: INTERNAL MEDICINE

## 2024-10-08 RX ORDER — DULOXETIN HYDROCHLORIDE 30 MG/1
90 CAPSULE, DELAYED RELEASE ORAL DAILY
Qty: 270 CAPSULE | Refills: 3 | Status: SHIPPED | OUTPATIENT
Start: 2024-10-08

## 2024-10-08 NOTE — PROGRESS NOTES
Subjective     Jermaine Pichardo is a 54 y.o. male who presents with   Chief Complaint   Patient presents with    Hypertension    Hyperlipidemia       Hypertension    Hyperlipidemia         The following data was reviewed by: Neena Sadler MD on 10/08/2024:  Common labs          4/5/2024    08:39 10/1/2024    08:12   Common Labs   Glucose 110  90    BUN 10  13    Creatinine 1.02  0.89    Sodium 142  143    Potassium 4.5  4.3    Chloride 104  104    Calcium 9.7  9.7    Total Protein 7.1  6.5    Albumin 4.9  4.5    Total Bilirubin <0.2  0.2    Alkaline Phosphatase 90  85    AST (SGOT) 14  16    ALT (SGPT) 12  15    WBC 4.95  3.93    Hemoglobin 13.9  13.2    Hematocrit 41.7  39.1    Platelets 333  334    Total Cholesterol 273  213    Triglycerides 121  119    HDL Cholesterol 54  45    LDL Cholesterol  197  147    PSA 0.542       HTN.  Control is not optimal.   HLD.  Cholesterol is up.  Not consistent with atrovastatin.    Depression with anxiety.  Not optimal despite Cymbalta 60 mg daily.      Review of Systems   Respiratory: Negative.     Cardiovascular: Negative.    Psychiatric/Behavioral:  Positive for dysphoric mood. Negative for suicidal ideas. The patient is nervous/anxious.        The following portions of the patient's history were reviewed and updated as appropriate: allergies, current medications and problem list.    Patient Active Problem List    Diagnosis Date Noted    Internal hemorrhoids 05/09/2024    Colon polyps 05/09/2024    Rectal bleeding 04/23/2024    Screening for colon cancer 04/23/2024    Family history of colon cancer 04/23/2024    Drug addiction in remission 04/12/2023    Hyperlipidemia 04/08/2022    Depression with anxiety 01/08/2019    DDD (degenerative disc disease), lumbar 09/14/2018    Withdrawal from opioids 07/27/2017    Low vitamin B12 level 02/14/2017    Asthma 02/11/2016     Note Last Updated: 2/11/2016     Impression: 10/27/2015 - This is not well controlled.  He is smoking again and  "knows that he needs to quit.  I have really encouraged him to do so.  I am prescribing him Singulair.  Impression: 04/29/2014 - Refill inhaler.;       Essential hypertension 02/11/2016    Other insomnia 02/11/2016       Current Outpatient Medications on File Prior to Visit   Medication Sig Dispense Refill    albuterol sulfate  (90 Base) MCG/ACT inhaler Inhale 1 puff Every 4 (Four) Hours As Needed for Wheezing. 18 g 8    atorvastatin (LIPITOR) 20 MG tablet Take 1 tablet by mouth Daily. 90 tablet 3    ciclopirox (PENLAC) 8 % solution Apply  topically to the appropriate area as directed Every Night. 6 mL 11    ciprofloxacin-dexAMETHasone (Ciprodex) 0.3-0.1 % otic suspension Administer 4 drops into both ears 2 (Two) Times a Day. 7.5 each 0    gabapentin (NEURONTIN) 800 MG tablet Take 1 tablet by mouth 4 (Four) Times a Day. 120 tablet 5    Hydrocortisone, Perianal, (Anusol-HC) 2.5 % rectal cream Insert  into the rectum 2 (Two) Times a Day As Needed (hemorrhoid bleeding). 30 g 1    lisinopril (PRINIVIL,ZESTRIL) 20 MG tablet Take 0.5 tablets by mouth Daily. TAKE 1 TABLET BY MOUTH EVERY DAY. 90 tablet 1    sildenafil (VIAGRA) 100 MG tablet TAKE 1 TABLET BY MOUTH DAILY AS NEEDED FOR ERECTILE DYSFUNCTION 30 tablet 0    [DISCONTINUED] DULoxetine (Cymbalta) 30 MG capsule Take 1 capsule by mouth Daily. 7 capsule 0    [DISCONTINUED] DULoxetine (Cymbalta) 60 MG capsule Take 1 capsule by mouth Daily. 30 capsule 11     No current facility-administered medications on file prior to visit.       Objective     /90   Pulse 60   Ht 193 cm (75.98\")   Wt 93 kg (205 lb)   SpO2 98%   BMI 24.96 kg/m²     Physical Exam  Constitutional:       Appearance: He is well-developed.   HENT:      Head: Atraumatic.   Pulmonary:      Effort: Pulmonary effort is normal.   Neurological:      Mental Status: He is alert and oriented to person, place, and time.         Assessment & Plan   Diagnoses and all orders for this visit:    1. " Depression with anxiety (Primary)    2. Essential hypertension    3. Hyperlipidemia, unspecified hyperlipidemia type    Other orders  -     DULoxetine (Cymbalta) 30 MG capsule; Take 3 capsules by mouth Daily.  Dispense: 270 capsule; Refill: 3        Discussion    Depression with anxiety.  Control is poor.  Increase Cymbalta to 90mg daily.   No SI/HI.  Gene sight testing is ordered.   HTN.  Control is not optimal.  The patient is advised to continue lisinopril.  The patient is instructed to monitor at home and call in checks.    HLD.  Control is not optimal.  The patient is advised to continue current dosage of atorvastatin and stressed compliance with taking.               Future Appointments   Date Time Provider Department Center   1/16/2025  9:00 AM Jose St MD [facility]   4/11/2025  8:20 AM [facility]   4/16/2025  7:45 AM Neena Sadler MD [facility]

## 2024-11-15 RX ORDER — LISINOPRIL 20 MG/1
20 TABLET ORAL DAILY
Qty: 90 TABLET | Refills: 1 | Status: SHIPPED | OUTPATIENT
Start: 2024-11-15

## 2024-11-25 RX ORDER — DULOXETIN HYDROCHLORIDE 30 MG/1
90 CAPSULE, DELAYED RELEASE ORAL DAILY
Qty: 270 CAPSULE | Refills: 3 | Status: SHIPPED | OUTPATIENT
Start: 2024-11-25

## 2025-01-08 RX ORDER — SILDENAFIL 100 MG/1
100 TABLET, FILM COATED ORAL DAILY PRN
Qty: 30 TABLET | Refills: 0 | Status: SHIPPED | OUTPATIENT
Start: 2025-01-08

## 2025-01-22 ENCOUNTER — TELEPHONE (OUTPATIENT)
Dept: SURGERY | Facility: CLINIC | Age: 55
End: 2025-01-22
Payer: COMMERCIAL

## 2025-01-22 NOTE — TELEPHONE ENCOUNTER
Attempted to reach patient in regards to his appointment request for an ongoing hemorrhoid issue. No answer, left VM.

## 2025-03-04 DIAGNOSIS — F41.8 DEPRESSION WITH ANXIETY: Primary | ICD-10-CM

## 2025-03-05 DIAGNOSIS — F41.8 DEPRESSION WITH ANXIETY: ICD-10-CM

## 2025-03-05 RX ORDER — GABAPENTIN 800 MG/1
800 TABLET ORAL 4 TIMES DAILY
Qty: 120 TABLET | Refills: 5 | Status: SHIPPED | OUTPATIENT
Start: 2025-03-05

## 2025-03-19 ENCOUNTER — TELEMEDICINE (OUTPATIENT)
Dept: INTERNAL MEDICINE | Facility: CLINIC | Age: 55
End: 2025-03-19
Payer: COMMERCIAL

## 2025-03-19 DIAGNOSIS — F41.8 DEPRESSION WITH ANXIETY: Primary | ICD-10-CM

## 2025-03-19 PROCEDURE — 99213 OFFICE O/P EST LOW 20 MIN: CPT | Performed by: INTERNAL MEDICINE

## 2025-03-19 RX ORDER — DESVENLAFAXINE 50 MG/1
50 TABLET, FILM COATED, EXTENDED RELEASE ORAL DAILY
Qty: 30 TABLET | Refills: 5 | Status: SHIPPED | OUTPATIENT
Start: 2025-03-19

## 2025-03-19 NOTE — PROGRESS NOTES
Subjective     Jermaine Pichardo is a 55 y.o. male who presents with   Chief Complaint   Patient presents with    Anxiety       Anxiety         You have chosen to receive care through a telehealth visit.  Do you consent to use a video/audio connection for your medical care today? Yes  Provider is located in [place].  The patient is located in [place].      F/u [facility].  I reviewed with the patient.      Zoloft did not work well.   Cymbalta not working well.      Review of Systems   Respiratory: Negative.     Cardiovascular: Negative.        The following portions of the patient's history were reviewed and updated as appropriate: allergies, current medications and problem list.    Patient Active Problem List    Diagnosis Date Noted    Internal hemorrhoids 05/09/2024    Colon polyps 05/09/2024    Rectal bleeding 04/23/2024    Screening for colon cancer 04/23/2024    Family history of colon cancer 04/23/2024    Drug addiction in remission 04/12/2023    Hyperlipidemia 04/08/2022    Depression with anxiety 01/08/2019    DDD (degenerative disc disease), lumbar 09/14/2018    Withdrawal from opioids 07/27/2017    Low vitamin B12 level 02/14/2017    Asthma 02/11/2016     Note Last Updated: 2/11/2016     Impression: 10/27/2015 - This is not well controlled.  He is smoking again and knows that he needs to quit.  I have really encouraged him to do so.  I am prescribing him Singulair.  Impression: 04/29/2014 - Refill inhaler.;       Essential hypertension 02/11/2016    Other insomnia 02/11/2016       Current Outpatient Medications on File Prior to Visit   Medication Sig Dispense Refill    albuterol sulfate  (90 Base) MCG/ACT inhaler Inhale 1 puff Every 4 (Four) Hours As Needed for Wheezing. 18 g 8    atorvastatin (LIPITOR) 20 MG tablet Take 1 tablet by mouth Daily. 90 tablet 3    ciclopirox (PENLAC) 8 % solution Apply  topically to the appropriate area as directed Every Night. 6 mL 11    gabapentin (NEURONTIN) 800 MG  tablet TAKE 1 TABLET BY MOUTH 4 TIMES A DAY. 120 tablet 5    Hydrocortisone, Perianal, (Anusol-HC) 2.5 % rectal cream Insert  into the rectum 2 (Two) Times a Day As Needed (hemorrhoid bleeding). 30 g 1    lisinopril (PRINIVIL,ZESTRIL) 20 MG tablet TAKE 1 TABLET BY MOUTH EVERY DAY 90 tablet 1    sildenafil (VIAGRA) 100 MG tablet TAKE 1 TABLET BY MOUTH DAILY AS NEEDED FOR ERECTILE DYSFUNCTION 30 tablet 0    [DISCONTINUED] ciprofloxacin-dexAMETHasone (Ciprodex) 0.3-0.1 % otic suspension Administer 4 drops into both ears 2 (Two) Times a Day. 7.5 each 0    [DISCONTINUED] DULoxetine (Cymbalta) 30 MG capsule Take 3 capsules by mouth Daily. 270 capsule 3     No current facility-administered medications on file prior to visit.       Objective     There were no vitals taken for this visit.    Physical Exam  Constitutional:       Appearance: He is well-developed.   HENT:      Head: Atraumatic.   Pulmonary:      Effort: Pulmonary effort is normal.   Neurological:      Mental Status: He is alert and oriented to person, place, and time.         Assessment & Plan   Diagnoses and all orders for this visit:    1. Depression with anxiety (Primary)    Other orders  -     desvenlafaxine (Pristiq) 50 MG 24 hr tablet; Take 1 tablet by mouth Daily.  Dispense: 30 tablet; Refill: 5        Discussion    Depression with anxiety.  Control is poor secondary to Cymbalta impacted by nicotine per genesight testing.  Reviewed drugs which would be used as directed.  Discussed Cymbalta wean.  Start Pristiq.  The patient will let me know of any side effects from the medication.               Future Appointments   Date Time Provider Department Center   4/8/2025  8:15 AM Stefany Hill MD [facility]   4/11/2025  8:20 AM [facility]   4/16/2025  7:45 AM Neena Sadler MD [facility]

## 2025-04-04 RX ORDER — DULOXETIN HYDROCHLORIDE 60 MG/1
60 CAPSULE, DELAYED RELEASE ORAL DAILY
Qty: 90 CAPSULE | Refills: 3 | Status: SHIPPED | OUTPATIENT
Start: 2025-04-04

## 2025-04-08 ENCOUNTER — OFFICE VISIT (OUTPATIENT)
Dept: SURGERY | Facility: CLINIC | Age: 55
End: 2025-04-08
Payer: COMMERCIAL

## 2025-04-08 VITALS
DIASTOLIC BLOOD PRESSURE: 90 MMHG | HEIGHT: 76 IN | BODY MASS INDEX: 25.45 KG/M2 | SYSTOLIC BLOOD PRESSURE: 160 MMHG | HEART RATE: 80 BPM | WEIGHT: 209 LBS | OXYGEN SATURATION: 99 %

## 2025-04-08 DIAGNOSIS — Z00.00 HEALTH MAINTENANCE EXAMINATION: Primary | ICD-10-CM

## 2025-04-08 DIAGNOSIS — Z12.5 SCREENING PSA (PROSTATE SPECIFIC ANTIGEN): ICD-10-CM

## 2025-04-08 DIAGNOSIS — K64.8 BLEEDING INTERNAL HEMORRHOIDS: Primary | ICD-10-CM

## 2025-04-08 DIAGNOSIS — E53.8 B12 DEFICIENCY: ICD-10-CM

## 2025-04-08 LAB
ALBUMIN SERPL-MCNC: 4.7 G/DL (ref 3.5–5.2)
ALBUMIN/GLOB SERPL: 2 G/DL
ALP SERPL-CCNC: 97 U/L (ref 39–117)
ALT SERPL-CCNC: 16 U/L (ref 1–41)
APPEARANCE UR: CLEAR
AST SERPL-CCNC: 22 U/L (ref 1–40)
BACTERIA #/AREA URNS HPF: NORMAL /HPF
BASOPHILS # BLD AUTO: 0.1 10*3/MM3 (ref 0–0.2)
BASOPHILS NFR BLD AUTO: 2.1 % (ref 0–1.5)
BILIRUB SERPL-MCNC: <0.2 MG/DL (ref 0–1.2)
BILIRUB UR QL STRIP: NEGATIVE
BUN SERPL-MCNC: 14 MG/DL (ref 6–20)
BUN/CREAT SERPL: 14 (ref 7–25)
CALCIUM SERPL-MCNC: 10 MG/DL (ref 8.6–10.5)
CASTS URNS MICRO: NORMAL
CHLORIDE SERPL-SCNC: 102 MMOL/L (ref 98–107)
CHOLEST SERPL-MCNC: 248 MG/DL (ref 0–200)
CO2 SERPL-SCNC: 29.2 MMOL/L (ref 22–29)
COLOR UR: YELLOW
CREAT SERPL-MCNC: 1 MG/DL (ref 0.76–1.27)
EGFRCR SERPLBLD CKD-EPI 2021: 89.4 ML/MIN/1.73
EOSINOPHIL # BLD AUTO: 0.44 10*3/MM3 (ref 0–0.4)
EOSINOPHIL NFR BLD AUTO: 9.3 % (ref 0.3–6.2)
EPI CELLS #/AREA URNS HPF: NORMAL /HPF
ERYTHROCYTE [DISTWIDTH] IN BLOOD BY AUTOMATED COUNT: 12 % (ref 12.3–15.4)
GLOBULIN SER CALC-MCNC: 2.3 GM/DL
GLUCOSE SERPL-MCNC: 93 MG/DL (ref 65–99)
GLUCOSE UR QL STRIP: NEGATIVE
HCT VFR BLD AUTO: 39.1 % (ref 37.5–51)
HDLC SERPL-MCNC: 53 MG/DL (ref 40–60)
HGB BLD-MCNC: 13.2 G/DL (ref 13–17.7)
HGB UR QL STRIP: NEGATIVE
IMM GRANULOCYTES # BLD AUTO: 0.01 10*3/MM3 (ref 0–0.05)
IMM GRANULOCYTES NFR BLD AUTO: 0.2 % (ref 0–0.5)
KETONES UR QL STRIP: ABNORMAL
LDLC SERPL CALC-MCNC: 177 MG/DL (ref 0–100)
LEUKOCYTE ESTERASE UR QL STRIP: NEGATIVE
LYMPHOCYTES # BLD AUTO: 1.38 10*3/MM3 (ref 0.7–3.1)
LYMPHOCYTES NFR BLD AUTO: 29.2 % (ref 19.6–45.3)
MCH RBC QN AUTO: 31.1 PG (ref 26.6–33)
MCHC RBC AUTO-ENTMCNC: 33.8 G/DL (ref 31.5–35.7)
MCV RBC AUTO: 92.2 FL (ref 79–97)
MONOCYTES # BLD AUTO: 0.55 10*3/MM3 (ref 0.1–0.9)
MONOCYTES NFR BLD AUTO: 11.7 % (ref 5–12)
NEUTROPHILS # BLD AUTO: 2.24 10*3/MM3 (ref 1.7–7)
NEUTROPHILS NFR BLD AUTO: 47.5 % (ref 42.7–76)
NITRITE UR QL STRIP: NEGATIVE
NRBC BLD AUTO-RTO: 0 /100 WBC (ref 0–0.2)
PH UR STRIP: 5.5 [PH] (ref 5–8)
PLATELET # BLD AUTO: 338 10*3/MM3 (ref 140–450)
POTASSIUM SERPL-SCNC: 4.4 MMOL/L (ref 3.5–5.2)
PROT SERPL-MCNC: 7 G/DL (ref 6–8.5)
PROT UR QL STRIP: NEGATIVE
PSA SERPL-MCNC: 0.72 NG/ML (ref 0–4)
RBC # BLD AUTO: 4.24 10*6/MM3 (ref 4.14–5.8)
RBC #/AREA URNS HPF: NORMAL /HPF
SODIUM SERPL-SCNC: 142 MMOL/L (ref 136–145)
SP GR UR STRIP: 1.03 (ref 1–1.03)
TRIGL SERPL-MCNC: 102 MG/DL (ref 0–150)
TSH SERPL DL<=0.005 MIU/L-ACNC: 1.82 UIU/ML (ref 0.27–4.2)
UROBILINOGEN UR STRIP-MCNC: ABNORMAL MG/DL
VIT B12 SERPL-MCNC: 302 PG/ML (ref 211–946)
VLDLC SERPL CALC-MCNC: 18 MG/DL (ref 5–40)
WBC # BLD AUTO: 4.72 10*3/MM3 (ref 3.4–10.8)
WBC #/AREA URNS HPF: NORMAL /HPF

## 2025-04-08 PROCEDURE — 99214 OFFICE O/P EST MOD 30 MIN: CPT | Performed by: SURGERY

## 2025-04-08 RX ORDER — HYDROCORTISONE 25 MG/G
CREAM TOPICAL 2 TIMES DAILY PRN
Qty: 30 G | Refills: 1 | Status: SHIPPED | OUTPATIENT
Start: 2025-04-08

## 2025-04-08 NOTE — PROGRESS NOTES
General Surgery  Established Patient Office Visit    CC: Follow-up hemorrhoids    HPI: The patient is a pleasant 55 y.o. year-old gentleman who presents today for recheck of his chronic bleeding internal hemorrhoids. I initially met him in April 2024 for evaluation of intermittent rectal bleeding that had been ongoing for the preceding 6 months.  He had painless hematochezia with about 90% of his bowel movements at that time. I performed a colonoscopy which revealed internal hemorrhoids, but no other source of rectal bleeding. He has been using Anusol cream as needed, but admits to very infrequent use lately as he has been using Preparation H and tucks pads more often. He still has rectal bleeding noted on the toilet paper after defecation with most BMs. He denies having to strain or struggle with constipation. He has palpable swollen hemorrhoids he can feel external to the anus, but on exam those appear to be extensions of a few internal hemorrhoid columns onto the external anal skin.    Past Medical History:   Asthma  Anxiety  Hypertension  Hyperlipidemia  Depression with anxiety  History of opioid addiction in remission  Erectile dysfunction  Vitamin B12 deficiency    Past Surgical History:   Mandible surgery  Colonoscopy (2024 by me - internal hemorrhoids)    Medications:   Albuterol inhaler 1 puff every 4 hours as needed for wheezing  Atorvastatin 20 mg daily  Ciclopirox 8% solution nightly  Desvenlafaxine 50 mg daily  Gabapentin 800 mg 4 times daily  Lisinopril 20 mg daily  Sildenafil 100 mg as needed for erectile dysfunction  Duloxetine 60 mg daily    Allergies: No known drug allergies    Family History: Paternal grandfather with history of colon cancer or prostate cancer (he is unsure)    Social History: , former smoker but quit in 2023, no regular alcohol use currently, history of opioid abuse and alcohol abuse    ROS:   Constitutional: Negative for fevers or chills  HENT: Negative for hearing loss  or runny nose  Eyes: Negative for vision changes or scleral icterus  Respiratory: Negative for cough or shortness of breath  Cardiovascular: Negative for chest pain or heart palpitations  Gastrointestinal: Positive for hematochezia; negative for abdominal distension, nausea, vomiting, constipation, or melena  Genitourinary: Negative for hematuria or dysuria  Musculoskeletal: Negative for joint swelling or gait instability  Neurologic: Negative for tremors or seizures  Psychiatric: Negative for suicidal ideations or agitation  All other systems reviewed and negative    Physical Exam:  Height: 193 cm  Weight: 94.8 kg  BMI: 25.45  General: No acute distress, well-nourished & well-developed  HEAD: normocephalic, atraumatic  EYES: normal conjunctiva, sclera anicteric  EARS: grossly normal hearing  NECK: supple, no thyromegaly  CARDIOVASCULAR: regular rate and rhythm  RESPIRATORY: clear to auscultation bilaterally  GASTROINTESTINAL: soft, nontender, non-distended  MUSCULOSKELETAL: normal gait and station. No gross extremity abnormalities  PSYCHIATRIC: oriented x3, normal mood and affect  RECTUM: multiple engorged external hemorrhoids congruent with what appear to be a few prolapsing internal hemorrhoids, no evidence for gross blood today    ASSESSMENT & PLAN  Mr. Pichardo is a 55-year-old gentleman with rectal bleeding, likely hemorrhoidal in nature. His bleeding hemorrhoids have been refractory to over the counter and prescription strength topical creams and I have recommended he consider undergoing operative hemorrhoidectomy. His internal hemorrhoids extend too low to consider hemorrhoid banding as it would likely induce pelvic sepsis. He was counseled on the risks of hemorrhoidectomy with include but are not limited to bleeding, wound infection, fecal incontinence, and possible hemorrhoid recurrence. His hemorrhoidal bleeding is not high enough volume that he would need to do this on an urgent basis. He would need to take  off work for a minimum of 2-3 weeks, and would like to speak to his boss regarding appropriate time off. He will call back to schedule when ready. I have refilled his Anusol cream to be used in the mean time.    Stefany Hill MD  General, Robotic, and Endoscopic Surgery  [facility]    [address], [address]  [place] [address]  P: [phone]  F: [phone]

## 2025-04-08 NOTE — H&P (VIEW-ONLY)
General Surgery  Established Patient Office Visit    CC: Follow-up hemorrhoids    HPI: The patient is a pleasant 55 y.o. year-old gentleman who presents today for recheck of his chronic bleeding internal hemorrhoids. I initially met him in April 2024 for evaluation of intermittent rectal bleeding that had been ongoing for the preceding 6 months.  He had painless hematochezia with about 90% of his bowel movements at that time. I performed a colonoscopy which revealed internal hemorrhoids, but no other source of rectal bleeding. He has been using Anusol cream as needed, but admits to very infrequent use lately as he has been using Preparation H and tucks pads more often. He still has rectal bleeding noted on the toilet paper after defecation with most BMs. He denies having to strain or struggle with constipation. He has palpable swollen hemorrhoids he can feel external to the anus, but on exam those appear to be extensions of a few internal hemorrhoid columns onto the external anal skin.    Past Medical History:   Asthma  Anxiety  Hypertension  Hyperlipidemia  Depression with anxiety  History of opioid addiction in remission  Erectile dysfunction  Vitamin B12 deficiency    Past Surgical History:   Mandible surgery  Colonoscopy (2024 by me - internal hemorrhoids)    Medications:   Albuterol inhaler 1 puff every 4 hours as needed for wheezing  Atorvastatin 20 mg daily  Ciclopirox 8% solution nightly  Desvenlafaxine 50 mg daily  Gabapentin 800 mg 4 times daily  Lisinopril 20 mg daily  Sildenafil 100 mg as needed for erectile dysfunction  Duloxetine 60 mg daily    Allergies: No known drug allergies    Family History: Paternal grandfather with history of colon cancer or prostate cancer (he is unsure)    Social History: , former smoker but quit in 2023, no regular alcohol use currently, history of opioid abuse and alcohol abuse    ROS:   Constitutional: Negative for fevers or chills  HENT: Negative for hearing loss  or runny nose  Eyes: Negative for vision changes or scleral icterus  Respiratory: Negative for cough or shortness of breath  Cardiovascular: Negative for chest pain or heart palpitations  Gastrointestinal: Positive for hematochezia; negative for abdominal distension, nausea, vomiting, constipation, or melena  Genitourinary: Negative for hematuria or dysuria  Musculoskeletal: Negative for joint swelling or gait instability  Neurologic: Negative for tremors or seizures  Psychiatric: Negative for suicidal ideations or agitation  All other systems reviewed and negative    Physical Exam:  Height: 193 cm  Weight: 94.8 kg  BMI: 25.45  General: No acute distress, well-nourished & well-developed  HEAD: normocephalic, atraumatic  EYES: normal conjunctiva, sclera anicteric  EARS: grossly normal hearing  NECK: supple, no thyromegaly  CARDIOVASCULAR: regular rate and rhythm  RESPIRATORY: clear to auscultation bilaterally  GASTROINTESTINAL: soft, nontender, non-distended  MUSCULOSKELETAL: normal gait and station. No gross extremity abnormalities  PSYCHIATRIC: oriented x3, normal mood and affect  RECTUM: multiple engorged external hemorrhoids congruent with what appear to be a few prolapsing internal hemorrhoids, no evidence for gross blood today    ASSESSMENT & PLAN  Mr. Pichardo is a 55-year-old gentleman with rectal bleeding, likely hemorrhoidal in nature. His bleeding hemorrhoids have been refractory to over the counter and prescription strength topical creams and I have recommended he consider undergoing operative hemorrhoidectomy. His internal hemorrhoids extend too low to consider hemorrhoid banding as it would likely induce pelvic sepsis. He was counseled on the risks of hemorrhoidectomy with include but are not limited to bleeding, wound infection, fecal incontinence, and possible hemorrhoid recurrence. His hemorrhoidal bleeding is not high enough volume that he would need to do this on an urgent basis. He would need to take  off work for a minimum of 2-3 weeks, and would like to speak to his boss regarding appropriate time off. He will call back to schedule when ready. I have refilled his Anusol cream to be used in the mean time.    Stefany Hill MD  General, Robotic, and Endoscopic Surgery  [facility]    [address], [address]  [place] [address]  P: [phone]  F: [phone]

## 2025-04-14 PROBLEM — K64.8 BLEEDING INTERNAL HEMORRHOIDS: Status: ACTIVE | Noted: 2025-04-08

## 2025-04-14 RX ORDER — METRONIDAZOLE 500 MG/100ML
500 INJECTION, SOLUTION INTRAVENOUS ONCE
OUTPATIENT
Start: 2025-04-14 | End: 2025-04-14

## 2025-04-16 ENCOUNTER — OFFICE VISIT (OUTPATIENT)
Dept: INTERNAL MEDICINE | Facility: CLINIC | Age: 55
End: 2025-04-16
Payer: COMMERCIAL

## 2025-04-16 VITALS
BODY MASS INDEX: 25.09 KG/M2 | SYSTOLIC BLOOD PRESSURE: 120 MMHG | DIASTOLIC BLOOD PRESSURE: 80 MMHG | HEART RATE: 82 BPM | OXYGEN SATURATION: 98 % | WEIGHT: 206 LBS | HEIGHT: 76 IN

## 2025-04-16 DIAGNOSIS — I10 ESSENTIAL HYPERTENSION: ICD-10-CM

## 2025-04-16 DIAGNOSIS — Z00.00 WELL ADULT EXAM: Primary | ICD-10-CM

## 2025-04-16 DIAGNOSIS — M51.360 DEGENERATION OF INTERVERTEBRAL DISC OF LUMBAR REGION WITH DISCOGENIC BACK PAIN: ICD-10-CM

## 2025-04-16 DIAGNOSIS — F41.8 DEPRESSION WITH ANXIETY: ICD-10-CM

## 2025-04-16 DIAGNOSIS — E78.5 HYPERLIPIDEMIA, UNSPECIFIED HYPERLIPIDEMIA TYPE: ICD-10-CM

## 2025-04-16 PROBLEM — K62.5 RECTAL BLEEDING: Status: RESOLVED | Noted: 2024-04-23 | Resolved: 2025-04-16

## 2025-04-16 PROBLEM — F11.93 WITHDRAWAL FROM OPIOIDS: Status: RESOLVED | Noted: 2017-07-27 | Resolved: 2025-04-16

## 2025-04-16 PROBLEM — Z12.11 SCREENING FOR COLON CANCER: Status: RESOLVED | Noted: 2024-04-23 | Resolved: 2025-04-16

## 2025-04-16 PROBLEM — Z80.0 FAMILY HISTORY OF COLON CANCER: Status: RESOLVED | Noted: 2024-04-23 | Resolved: 2025-04-16

## 2025-04-16 PROCEDURE — 99396 PREV VISIT EST AGE 40-64: CPT | Performed by: INTERNAL MEDICINE

## 2025-04-16 RX ORDER — DESVENLAFAXINE 100 MG/1
100 TABLET, EXTENDED RELEASE ORAL DAILY
Qty: 90 TABLET | Refills: 3 | Status: SHIPPED | OUTPATIENT
Start: 2025-04-16

## 2025-04-16 NOTE — LETTER
Controlled Substance Prescribing Agreement          I, Jermaine Pichardo [PATIENT],  1970 [] a patient of  Neena Sadler MD   [PROVIDER] at [facility] [PRACTICE], have been informed that  individuals who are prescribed certain Controlled Substances including, but not limited to, narcotic pain medicines, stimulants, benzodiazepine tranquilizers, and barbiturate sedatives, can abuse those substances or may allow abuse by others, and have some risk of developing an addictive disorder or suffering a relapse of a prior addiction. Therefore, I have been informed that it is necessary to observe strict rules pertaining to their use, and I agree to follow the terms and procedures described in this Agreement as consideration for, and as a condition of, the willingness of the physician whose signature appears below to consider prescribing or to continue prescribing Controlled Substances to treat my pain.     1. I will inform my physician of any current or past substance abuse, or any current or past substance abuse of any immediate member of my immediate family.     2. I agree that I may be subject to a voluntary evaluation by psychologists and/or psychiatrists, possibly at my own expense, before any Controlled Substances will be prescribed to me. I agree that the need to be evaluated by psychologists and/or psychiatrists may be revisited every three (3) to six (6) months thereafter while taking the medication.     3. All Controlled Substances must come from a provider in the PROVIDER’S PRACTICE. My Controlled Substances will come from the PROVIDER whose signature appears below, or during his or her absence, by the covering provider, unless specific written authorization is obtained from the office for an exception.     4. I will obtain all Controlled Substances from the same pharmacy. Should the need arise to change pharmacies, I will inform the PROVIDER’S office.     5. I will inform  the PROVIDER’S office of any new medications or medical conditions, and of any adverse effects I experience from any of the medications that I take.     6. I will inform my other health care providers that I am taking the Controlled Substances listed above, and of the existence of this Agreement. In the event of an emergency, I will provide the foregoing information to emergency department providers.     7. I agree that my prescribing PROVIDER has permission to discuss all diagnostic and treatment details with other health care providers, pharmacists, or other professionals who provide my health care regarding my use of Controlled Substances for purposes of maintaining accountability.     8. I will not allow anyone else to have, use sell, or otherwise have access to these medications. The sharing of medications with anyone is absolutely forbidden and is against the law.         9. I understand that Controlled Substances may be hazardous or lethal to a person who is not tolerant to their effects, especially a child, and that I must keep them out of reach of such people for their own safety.     10. I understand that tampering with a written prescription is a felony and I will not change or tamper with the PROVIDER’S written prescription.     11. I am aware that attempting to obtain a Controlled Substance under false pretenses is illegal.     12. I agree not to alter my medication in any way, and I will take my medication whole, and it will not be broken, chewed, crushed, injected, or snorted.     13. I will take my medication as instructed and prescribed, and I will not exceed the maximum prescribed dose. Any change in dosage must be approved by the PROVIDER or a physician within the PRACTICE.     14. I understand that these drugs should not be stopped abruptly, as withdrawal syndromes may develop.     15. I will cooperate with unannounced urine or serum toxicology screenings as may be requested, as well as any  random pill counts of medication by the PROVIDER. Failure to comply may result in termination of the PROVIDER-patient relationship.     16. I understand that the presence of unauthorized and/or illegal substances in the screenings described in the paragraph above may prompt referral for assessment for a substance abuse disorder or termination of the PROVIDER-patient relationship.     17. I understand that medications may not be replaced if they are lost, damaged, or stolen. If any of these situations arise that cause me to request an early refill of my medication, a copy of a filed police report or a statement from me explaining the circumstances may be required before additional prescriptions are considered. If I request an early refill secondary to lost, damaged, or stolen prescriptions twice within a year, I may be discharged from the practice.     18. I understand that a prescription may be given early if the PROVIDER or the patient will be out of town when the refill is due. These prescriptions will contain instructions to the pharmacist that the prescriptions(s) may not be filled prior to the appropriate date.     19. If the responsible legal authorities have questions concerning my treatment, as may occur, for example, if I obtained medication at several pharmacies, all confidentiality is waived, and these authorities may be given full access to my full records of Controlled Substances administration.     20. I will keep my scheduled appointments in order to receive medication renewals. If I need to cancel my appointment, I will do so a minimum of twenty-four (24) hours before it is scheduled.     21. I understand that I may be asked to bring my medications in their original container to the PROVIDER’s office while I am on controlled medication.     22. Refills generally will not be given over the phone, after office hours, during the weekends, and on holidays.     23. I understand that any medical treatment  is initially a trial, with the goal of treatment being to improve the quality of life and ability to function and/or work. These parameters will be assessed periodically to determine the benefits of continued therapy, and continued prescription is contingent on whether my physician believes that the medication usage benefits me. I will comply with all treatments as outlined by the PROVIDER.     24. I have been explained the risks and potential benefits of these therapies, including, but not limited to, psychological addiction, physical dependence, withdrawal and over dosage.     25. I understand that failure to adhere to these policies and/or failure to comply with the PROVIDER’S treatment plan may result in cessation of therapy with Controlled Substance prescribing by the PROVIDER or referral for further specialty assessment, as well as possible discharge from the PRACTICE.     26. I, the undersigned patient, attest that the foregoing was discussed with me, and that I have read, fully understand, and agree to all of the above requirements and instructions. I affirm that I have the full right and power to sign and be bound by this  Agreement.         Date:  __________________________________________    Time:  __________________________________________    Patient Printed Name:  _____________________________    Patient Signature:  ________________________________           Date:  __________________________________________    Time:  __________________________________________    Provider Signature:  _______________________________

## 2025-04-16 NOTE — PROGRESS NOTES
Subjective     Jermaine Pichardo is a 55 y.o. male who presents for a complete physical exam.      History of Present Illness     C/o chronic low back pain.  H/o scoliosis.  Intermittent.  Goes out every one to three months.  When it goes out he can take several days off work.    HTN.  Good control.  HLD.  Increased cholesterol.  Not consistent with meds.    Depression with anxiety.  Fair control on Pristiq.      Review of Systems   Constitutional: Negative.    HENT: Negative.     Eyes: Negative.    Respiratory: Negative.     Cardiovascular: Negative.    Gastrointestinal: Negative.    Endocrine: Negative.    Genitourinary: Negative.    Musculoskeletal:  Positive for back pain.   Skin: Negative.    Allergic/Immunologic: Negative.    Neurological: Negative.    Hematological: Negative.    Psychiatric/Behavioral: Negative.         The following portions of the patient's history were reviewed and updated as appropriate: allergies, current medications, past family history, past medical history, past social history, past surgical history and problem list.  Health maintenance tab was reviewed and updated with the patient.       Patient Active Problem List    Diagnosis Date Noted    Bleeding internal hemorrhoids 04/08/2025    Internal hemorrhoids 05/09/2024    Colon polyps 05/09/2024    Drug addiction in remission 04/12/2023    Hyperlipidemia 04/08/2022    Depression with anxiety 01/08/2019    DDD (degenerative disc disease), lumbar 09/14/2018    Low vitamin B12 level 02/14/2017    Asthma 02/11/2016     Note Last Updated: 2/11/2016     Impression: 10/27/2015 - This is not well controlled.  He is smoking again and knows that he needs to quit.  I have really encouraged him to do so.  I am prescribing him Singulair.  Impression: 04/29/2014 - Refill inhaler.;       Essential hypertension 02/11/2016    Other insomnia 02/11/2016       Past Medical History:   Diagnosis Date    Actinic keratosis     Anxiety     Asthma     Depression      Erectile dysfunction     Hemorrhoids     History of medical problems Hemorrhoid issue, surgery scheduled    1.5 yrs ago    Hyperlipidemia     Hypertension     Left bundle branch block (LBBB)     Low back pain     Scoliosis        Past Surgical History:   Procedure Laterality Date    COLONOSCOPY N/A 2024    Procedure: COLONOSCOPY TO CECUM WITH HOT SNARE POLYPECTOMY AND COLD FORCEP POLYPECTOMY;  Surgeon: Stefany Hill MD;  Location: [facility] ENDOSCOPY;  Service: General;  Laterality: N/A;  PRE- RECTAL BLEEDING  POST- COLON POLYPS DIVERTICULOSIS, INT. HEMORRHOIDS    MANDIBLE SURGERY         Family History   Problem Relation Age of Onset    Diabetes Mother     Colon cancer Paternal Grandfather     Malig Hyperthermia Neg Hx        Social History     Socioeconomic History    Marital status:     Number of children: 3   Tobacco Use    Smoking status: Former     Current packs/day: 0.00     Average packs/day: 1 pack/day for 17.0 years (17.0 ttl pk-yrs)     Types: Cigarettes     Start date: 2006     Quit date: 2023     Years since quittin.2    Smokeless tobacco: Current     Types: Snuff    Tobacco comments:     started at age 41   Vaping Use    Vaping status: Former    Substances: Nicotine   Substance and Sexual Activity    Alcohol use: Yes     Comment: QUIT     Drug use: No    Sexual activity: Yes     Partners: Female     Birth control/protection: Vasectomy       Current Outpatient Medications on File Prior to Visit   Medication Sig Dispense Refill    albuterol sulfate  (90 Base) MCG/ACT inhaler Inhale 1 puff Every 4 (Four) Hours As Needed for Wheezing. 18 g 8    atorvastatin (LIPITOR) 20 MG tablet Take 1 tablet by mouth Daily. 90 tablet 3    ciclopirox (PENLAC) 8 % solution Apply  topically to the appropriate area as directed Every Night. 6 mL 11    gabapentin (NEURONTIN) 800 MG tablet TAKE 1 TABLET BY MOUTH 4 TIMES A DAY. 120 tablet 5    Hydrocortisone, Perianal, (Anusol-HC) 2.5 %  "rectal cream Insert  into the rectum 2 (Two) Times a Day As Needed (hemorrhoid bleeding). 30 g 1    lisinopril (PRINIVIL,ZESTRIL) 20 MG tablet TAKE 1 TABLET BY MOUTH EVERY DAY 90 tablet 1    sildenafil (VIAGRA) 100 MG tablet TAKE 1 TABLET BY MOUTH DAILY AS NEEDED FOR ERECTILE DYSFUNCTION 30 tablet 0    [DISCONTINUED] desvenlafaxine (Pristiq) 50 MG 24 hr tablet Take 1 tablet by mouth Daily. 30 tablet 5    [DISCONTINUED] DULoxetine (CYMBALTA) 60 MG capsule TAKE 1 CAPSULE BY MOUTH EVERY DAY (Patient not taking: Reported on 4/8/2025) 90 capsule 3     No current facility-administered medications on file prior to visit.       No Known Allergies    Immunization History   Administered Date(s) Administered    COVID-19 (PFIZER) Purple Cap Monovalent 03/20/2021, 04/10/2021, 12/15/2021    Pneumococcal Polysaccharide (PPSV23) 04/27/2017    Shingrix 04/13/2024, 06/17/2024    Td (TDVAX) 03/01/2009    Tdap 01/08/2019    flucelvax quad pfs =>4 YRS 01/08/2019       Objective     /90   Pulse 82   Ht 193 cm (75.98\")   Wt 93.4 kg (206 lb)   SpO2 98%   BMI 25.09 kg/m²     Physical Exam  Constitutional:       Appearance: He is well-developed.   HENT:      Head: Normocephalic and atraumatic.      Right Ear: Hearing and tympanic membrane normal.      Left Ear: Hearing and tympanic membrane normal.      Mouth/Throat:      Pharynx: No posterior oropharyngeal erythema.   Neck:      Thyroid: No thyromegaly.   Cardiovascular:      Rate and Rhythm: Normal rate and regular rhythm.      Heart sounds: Normal heart sounds. No murmur heard.  Pulmonary:      Effort: Pulmonary effort is normal.      Breath sounds: Normal breath sounds.   Abdominal:      General: There is no distension.      Palpations: Abdomen is soft.      Tenderness: There is no abdominal tenderness.   Musculoskeletal:      Cervical back: Neck supple.   Lymphadenopathy:      Cervical: No cervical adenopathy.   Skin:     General: Skin is warm and dry.   Neurological:      " Mental Status: He is alert and oriented to person, place, and time.   Psychiatric:         Speech: Speech normal.         Behavior: Behavior normal.         Thought Content: Thought content normal.         Judgment: Judgment normal.         Assessment & Plan   Diagnoses and all orders for this visit:    1. Well adult exam (Primary)    2. Essential hypertension    3. Hyperlipidemia, unspecified hyperlipidemia type    4. Degeneration of intervertebral disc of lumbar region with discogenic back pain    5. Depression with anxiety    Other orders  -     desvenlafaxine (Pristiq) 100 MG 24 hr tablet; Take 1 tablet by mouth Daily.  Dispense: 90 tablet; Refill: 3        Discussion    Patient presents today for a CPE.      Patient follows a healthy diet.   Patient follows an adequate exercise regimen. Prostate cancer screening is up to date.  Colon cancer screening is up to date.   Discussed importance of preventative care including vaccinations, age appropriate cancer screening, routine lab work, healthy diet, and active lifestyle.   HTN.  Control is good.  The patient is advised to continue current dosage of lisinopril.   HLD.  Poor control.  Discussed compliance.    Chronic low back pain.  Control is fair.  The patient is advised to continue current dosage of gabapentin.  I suggested yoga for stretching.    Depression with anxiety.  Control is not optimal.  Increase Pristiq to 100mg daily.                Health Maintenance   Topic Date Due    Pneumococcal Vaccine 50+ (2 of 2 - PCV) 04/27/2018    ANNUAL PHYSICAL  04/12/2025    COVID-19 Vaccine (4 - 2024-25 season) 10/16/2025 (Originally 9/1/2024)    INFLUENZA VACCINE  07/01/2025    LIPID PANEL  04/08/2026    TDAP/TD VACCINES (3 - Td or Tdap) 01/08/2029    COLORECTAL CANCER SCREENING  05/09/2034    HEPATITIS C SCREENING  Completed    ZOSTER VACCINE  Completed            Future Appointments   Date Time Provider Department Center   4/22/2025  4:00 PM  [facility] PAT 1  [facility] PAT  [facility]   5/15/2025  1:30 PM Stefany Hill MD [facility]

## 2025-04-22 ENCOUNTER — PRE-ADMISSION TESTING (OUTPATIENT)
Dept: PREADMISSION TESTING | Facility: HOSPITAL | Age: 55
End: 2025-04-22
Payer: COMMERCIAL

## 2025-04-22 VITALS
SYSTOLIC BLOOD PRESSURE: 131 MMHG | RESPIRATION RATE: 16 BRPM | HEART RATE: 76 BPM | HEIGHT: 75 IN | WEIGHT: 204.5 LBS | TEMPERATURE: 97.8 F | DIASTOLIC BLOOD PRESSURE: 83 MMHG | OXYGEN SATURATION: 99 % | BODY MASS INDEX: 25.43 KG/M2

## 2025-04-22 DIAGNOSIS — K64.8 BLEEDING INTERNAL HEMORRHOIDS: ICD-10-CM

## 2025-04-22 LAB
QT INTERVAL: 470 MS
QTC INTERVAL: 499 MS

## 2025-04-22 PROCEDURE — 36415 COLL VENOUS BLD VENIPUNCTURE: CPT

## 2025-04-22 PROCEDURE — 93005 ELECTROCARDIOGRAM TRACING: CPT

## 2025-04-22 PROCEDURE — 93010 ELECTROCARDIOGRAM REPORT: CPT | Performed by: INTERNAL MEDICINE

## 2025-04-22 NOTE — DISCHARGE INSTRUCTIONS
Take the following medications the morning of surgery:  GABAPENTIN AND PRISTIQ    If you are on prescription narcotic pain medication to control your pain you may also take that medication the morning of surgery.    General Instructions:    Follow your surgeons instructions regarding when to stop solid foods and when to stop liquids.   Verify with your surgeon if you are to complete a bowel prep and when to do so.  Patients who avoid smoking, chewing tobacco and alcohol for 4 weeks prior to surgery have a reduced risk of post-operative complications.  Quit smoking as many days before surgery as you can.  Do not smoke, use chewing tobacco or drink alcohol the day of surgery.   If applicable bring your C-PAP/ BI-PAP machine in with you to preop day of surgery.  Bring any papers given to you in the doctor’s office.  Wear clean comfortable clothes.  Do not wear contact lenses, false eyelashes or make-up.  Bring a case for your glasses.   Bring crutches or walker if applicable.  Remove all piercings.  Leave jewelry and any other valuables at home.  Hair extensions with metal clips must be removed prior to surgery.  The Pre-Admission Testing nurse will instruct you to bring medications if unable to obtain an accurate list in Pre-Admission Testing.    Day of surgery you will need to let the preoperative nurse know the last time you took each of your medications.  To ensure a safe environment for patients and staff, we kindly ask that children under the age of 16 not accompany patients.  If you must bring a dependent child or dependent adult please ensure a responsible adult, other than yourself, is present to supervise them.        If you were given a blood bank ID arm band remember to bring it with you the day of surgery.    Preventing a Surgical Site Infection:  For 2 to 3 days before surgery, avoid shaving with a razor because the razor can irritate skin and make it easier to develop an infection.    Any areas of open  skin can increase the risk of a post-operative wound infection by allowing bacteria to enter and travel throughout the body.  Notify your surgeon if you have any skin wounds / rashes even if it is not near the expected surgical site.  The area will need assessed to determine if surgery should be delayed until it is healed.  The night prior to surgery shower using a fresh bar of anti-bacterial soap (such as Dial) and clean washcloth.  Sleep in a clean bed with clean clothing.  Do not allow pets to sleep with you.  Shower on the morning of surgery using a fresh bar of anti-bacterial soap (such as Dial) and clean washcloth.  Dry with a clean towel and dress in clean clothing.  Ask your surgeon if you will be receiving antibiotics prior to surgery.  Make sure you, your family, and all healthcare providers clean their hands with soap and water or an alcohol based hand  before caring for you or your wound.    Day of surgery:  Your arrival time is approximately two hours before your scheduled surgery time.  Upon arrival, a Pre-op nurse and Anesthesiologist will review your health history, obtain vital signs, and answer questions you may have.  The only belongings needed at this time will be a list of your home medications and if applicable your C-PAP/BI-PAP machine.  A Pre-op nurse will start an IV and you may receive medication in preparation for surgery, including something to help you relax.     Please be aware that surgery does come with discomfort.  We want to make every effort to control your discomfort so please discuss any uncontrolled symptoms with your nurse.   Your doctor will most likely have prescribed pain medications.      If you are going home after surgery you will receive individualized written care instructions before being discharged.  A responsible adult must drive you to and from the hospital on the day of your surgery and stay with you for 24 hours.  Discharge prescriptions can be filled by  the hospital pharmacy during regular pharmacy hours.  If you are having surgery late in the day/evening your prescription may be e-prescribed to your pharmacy.  Please verify your pharmacy hours or chose a 24 hour pharmacy to avoid not having access to your prescription because your pharmacy has closed for the day.    If you are staying overnight following surgery, you will be transported to your hospital room following the recovery period.  [facility] has all private rooms.    If you have any questions please call Pre-Admission Testing at [phone].  Deductibles and co-payments are collected on the day of service. Please be prepared to pay the required co-pay, deductible or deposit on the day of service as defined by your plan.    Call your surgeon immediately if you experience any of the following symptoms:  Sore Throat  Shortness of Breath or difficulty breathing  Cough  Chills  Body soreness or muscle pain  Headache  Fever  New loss of taste or smell  Do not arrive for your surgery ill.  Your procedure will need to be rescheduled to another time.  You will need to call your physician before the day of surgery to avoid any unnecessary exposure to hospital staff as well as other patients.    CHLORHEXIDINE CLOTH INSTRUCTIONS  The morning of surgery follow these instructions using the Chlorhexidine cloths you've been given.  These steps reduce bacteria on the body.  Do not use the cloths near your eyes, ears mouth, genitalia or on open wounds.  Throw the cloths away after use but do not try to flush them down a toilet.      Open and remove one cloth at a time from the package.    Leave the cloth unfolded and begin the bathing.  Massage the skin with the cloths using gentle pressure to remove bacteria.  Do not scrub harshly.   Follow the steps below with one 2% CHG cloth per area (6 total cloths).  One cloth for neck, shoulders and chest.  One cloth for both arms, hands, fingers and underarms (do  underarms last).  One cloth for the abdomen followed by groin.  One cloth for right leg and foot including between the toes.  One cloth for left leg and foot including between the toes.  The last cloth is to be used for the back of the neck, back and buttocks.    Allow the CHG to air dry 3 minutes on the skin which will give it time to work and decrease the chance of irritation.  The skin may feel sticky until it is dry.  Do not rinse with water or any other liquid or you will lose the beneficial effects of the CHG.  If mild skin irritation occurs, do rinse the skin to remove the CHG.  Report this to the nurse at time of admission.  Do not apply lotions, creams, ointments, deodorants or perfumes after using the clothes. Dress in clean clothes before coming to the hospital.

## 2025-04-29 ENCOUNTER — ANESTHESIA (OUTPATIENT)
Dept: PERIOP | Facility: HOSPITAL | Age: 55
End: 2025-04-29
Payer: COMMERCIAL

## 2025-04-29 ENCOUNTER — ANESTHESIA EVENT (OUTPATIENT)
Dept: PERIOP | Facility: HOSPITAL | Age: 55
End: 2025-04-29
Payer: COMMERCIAL

## 2025-04-29 ENCOUNTER — HOSPITAL ENCOUNTER (OUTPATIENT)
Facility: HOSPITAL | Age: 55
Setting detail: HOSPITAL OUTPATIENT SURGERY
Discharge: HOME | End: 2025-04-29
Attending: SURGERY | Admitting: SURGERY
Payer: COMMERCIAL

## 2025-04-29 VITALS
TEMPERATURE: 97.9 F | RESPIRATION RATE: 16 BRPM | HEIGHT: 76 IN | BODY MASS INDEX: 24.89 KG/M2 | DIASTOLIC BLOOD PRESSURE: 84 MMHG | OXYGEN SATURATION: 99 % | SYSTOLIC BLOOD PRESSURE: 143 MMHG | HEART RATE: 75 BPM

## 2025-04-29 DIAGNOSIS — K64.8 BLEEDING INTERNAL HEMORRHOIDS: Primary | ICD-10-CM

## 2025-04-29 PROCEDURE — 25010000002 FAMOTIDINE 10 MG/ML SOLUTION: Performed by: ANESTHESIOLOGY

## 2025-04-29 PROCEDURE — 25010000002 DEXAMETHASONE PER 1 MG: Performed by: NURSE ANESTHETIST, CERTIFIED REGISTERED

## 2025-04-29 PROCEDURE — 25010000002 ONDANSETRON PER 1 MG: Performed by: NURSE ANESTHETIST, CERTIFIED REGISTERED

## 2025-04-29 PROCEDURE — 25010000002 PROPOFOL 10 MG/ML EMULSION: Performed by: NURSE ANESTHETIST, CERTIFIED REGISTERED

## 2025-04-29 PROCEDURE — 25810000003 LACTATED RINGERS PER 1000 ML: Performed by: ANESTHESIOLOGY

## 2025-04-29 PROCEDURE — 46255 REMOVE INT/EXT HEM 1 GROUP: CPT | Performed by: SURGERY

## 2025-04-29 PROCEDURE — 25010000002 CEFAZOLIN PER 500 MG: Performed by: SURGERY

## 2025-04-29 PROCEDURE — 25010000002 METRONIDAZOLE 500 MG/100ML SOLUTION: Performed by: SURGERY

## 2025-04-29 PROCEDURE — 88304 TISSUE EXAM BY PATHOLOGIST: CPT | Performed by: SURGERY

## 2025-04-29 PROCEDURE — 25010000002 MIDAZOLAM PER 1 MG: Performed by: ANESTHESIOLOGY

## 2025-04-29 PROCEDURE — 25010000002 FENTANYL CITRATE (PF) 50 MCG/ML SOLUTION: Performed by: NURSE ANESTHETIST, CERTIFIED REGISTERED

## 2025-04-29 PROCEDURE — 25010000002 BUPIVACAINE LIPOSOME 1.3 % SUSPENSION 20 ML VIAL: Performed by: SURGERY

## 2025-04-29 PROCEDURE — 25010000002 LIDOCAINE 2% SOLUTION: Performed by: NURSE ANESTHETIST, CERTIFIED REGISTERED

## 2025-04-29 DEVICE — HEMOST ABS SURGIFOAM SZ100 8X12 10MM: Type: IMPLANTABLE DEVICE | Site: RECTUM | Status: FUNCTIONAL

## 2025-04-29 RX ORDER — ONDANSETRON 2 MG/ML
4 INJECTION INTRAMUSCULAR; INTRAVENOUS ONCE AS NEEDED
Status: DISCONTINUED | OUTPATIENT
Start: 2025-04-29 | End: 2025-04-29 | Stop reason: HOSPADM

## 2025-04-29 RX ORDER — EPHEDRINE SULFATE 50 MG/ML
5 INJECTION, SOLUTION INTRAVENOUS ONCE AS NEEDED
Status: DISCONTINUED | OUTPATIENT
Start: 2025-04-29 | End: 2025-04-29 | Stop reason: HOSPADM

## 2025-04-29 RX ORDER — LIDOCAINE HYDROCHLORIDE 20 MG/ML
INJECTION, SOLUTION INFILTRATION; PERINEURAL AS NEEDED
Status: DISCONTINUED | OUTPATIENT
Start: 2025-04-29 | End: 2025-04-29 | Stop reason: SURG

## 2025-04-29 RX ORDER — ATROPINE SULFATE 0.4 MG/ML
0.4 INJECTION, SOLUTION INTRAMUSCULAR; INTRAVENOUS; SUBCUTANEOUS ONCE AS NEEDED
Status: DISCONTINUED | OUTPATIENT
Start: 2025-04-29 | End: 2025-04-29 | Stop reason: HOSPADM

## 2025-04-29 RX ORDER — PROMETHAZINE HYDROCHLORIDE 25 MG/1
25 TABLET ORAL ONCE AS NEEDED
Status: DISCONTINUED | OUTPATIENT
Start: 2025-04-29 | End: 2025-04-29 | Stop reason: HOSPADM

## 2025-04-29 RX ORDER — OXYCODONE AND ACETAMINOPHEN 5; 325 MG/1; MG/1
1 TABLET ORAL EVERY 4 HOURS PRN
Qty: 30 TABLET | Refills: 0 | Status: SHIPPED | OUTPATIENT
Start: 2025-04-29

## 2025-04-29 RX ORDER — FLUMAZENIL 0.1 MG/ML
0.2 INJECTION INTRAVENOUS AS NEEDED
Status: DISCONTINUED | OUTPATIENT
Start: 2025-04-29 | End: 2025-04-29 | Stop reason: HOSPADM

## 2025-04-29 RX ORDER — PROPOFOL 10 MG/ML
VIAL (ML) INTRAVENOUS AS NEEDED
Status: DISCONTINUED | OUTPATIENT
Start: 2025-04-29 | End: 2025-04-29 | Stop reason: SURG

## 2025-04-29 RX ORDER — HYDROCODONE BITARTRATE AND ACETAMINOPHEN 5; 325 MG/1; MG/1
1 TABLET ORAL ONCE AS NEEDED
Status: DISCONTINUED | OUTPATIENT
Start: 2025-04-29 | End: 2025-04-29 | Stop reason: HOSPADM

## 2025-04-29 RX ORDER — IBUPROFEN 800 MG/1
800 TABLET, FILM COATED ORAL EVERY 8 HOURS PRN
Qty: 30 TABLET | Refills: 2 | Status: SHIPPED | OUTPATIENT
Start: 2025-04-29

## 2025-04-29 RX ORDER — LABETALOL HYDROCHLORIDE 5 MG/ML
5 INJECTION, SOLUTION INTRAVENOUS
Status: DISCONTINUED | OUTPATIENT
Start: 2025-04-29 | End: 2025-04-29 | Stop reason: HOSPADM

## 2025-04-29 RX ORDER — FENTANYL CITRATE 50 UG/ML
50 INJECTION, SOLUTION INTRAMUSCULAR; INTRAVENOUS ONCE AS NEEDED
Status: DISCONTINUED | OUTPATIENT
Start: 2025-04-29 | End: 2025-04-29 | Stop reason: HOSPADM

## 2025-04-29 RX ORDER — HYDRALAZINE HYDROCHLORIDE 20 MG/ML
5 INJECTION INTRAMUSCULAR; INTRAVENOUS
Status: DISCONTINUED | OUTPATIENT
Start: 2025-04-29 | End: 2025-04-29 | Stop reason: HOSPADM

## 2025-04-29 RX ORDER — SODIUM CHLORIDE 0.9 % (FLUSH) 0.9 %
3-10 SYRINGE (ML) INJECTION AS NEEDED
Status: DISCONTINUED | OUTPATIENT
Start: 2025-04-29 | End: 2025-04-29 | Stop reason: HOSPADM

## 2025-04-29 RX ORDER — DEXAMETHASONE SODIUM PHOSPHATE 4 MG/ML
INJECTION, SOLUTION INTRA-ARTICULAR; INTRALESIONAL; INTRAMUSCULAR; INTRAVENOUS; SOFT TISSUE AS NEEDED
Status: DISCONTINUED | OUTPATIENT
Start: 2025-04-29 | End: 2025-04-29 | Stop reason: SURG

## 2025-04-29 RX ORDER — DROPERIDOL 2.5 MG/ML
0.62 INJECTION, SOLUTION INTRAMUSCULAR; INTRAVENOUS
Status: DISCONTINUED | OUTPATIENT
Start: 2025-04-29 | End: 2025-04-29 | Stop reason: HOSPADM

## 2025-04-29 RX ORDER — MAGNESIUM HYDROXIDE 1200 MG/15ML
LIQUID ORAL AS NEEDED
Status: DISCONTINUED | OUTPATIENT
Start: 2025-04-29 | End: 2025-04-29 | Stop reason: HOSPADM

## 2025-04-29 RX ORDER — ONDANSETRON 2 MG/ML
INJECTION INTRAMUSCULAR; INTRAVENOUS AS NEEDED
Status: DISCONTINUED | OUTPATIENT
Start: 2025-04-29 | End: 2025-04-29 | Stop reason: SURG

## 2025-04-29 RX ORDER — SODIUM CHLORIDE 0.9 % (FLUSH) 0.9 %
3 SYRINGE (ML) INJECTION EVERY 12 HOURS SCHEDULED
Status: DISCONTINUED | OUTPATIENT
Start: 2025-04-29 | End: 2025-04-29 | Stop reason: HOSPADM

## 2025-04-29 RX ORDER — PROMETHAZINE HYDROCHLORIDE 25 MG/1
25 SUPPOSITORY RECTAL ONCE AS NEEDED
Status: DISCONTINUED | OUTPATIENT
Start: 2025-04-29 | End: 2025-04-29 | Stop reason: HOSPADM

## 2025-04-29 RX ORDER — OXYCODONE AND ACETAMINOPHEN 7.5; 325 MG/1; MG/1
1 TABLET ORAL EVERY 4 HOURS PRN
Status: DISCONTINUED | OUTPATIENT
Start: 2025-04-29 | End: 2025-04-29 | Stop reason: HOSPADM

## 2025-04-29 RX ORDER — DIPHENHYDRAMINE HYDROCHLORIDE 50 MG/ML
12.5 INJECTION, SOLUTION INTRAMUSCULAR; INTRAVENOUS
Status: DISCONTINUED | OUTPATIENT
Start: 2025-04-29 | End: 2025-04-29 | Stop reason: HOSPADM

## 2025-04-29 RX ORDER — ROCURONIUM BROMIDE 10 MG/ML
INJECTION, SOLUTION INTRAVENOUS AS NEEDED
Status: DISCONTINUED | OUTPATIENT
Start: 2025-04-29 | End: 2025-04-29 | Stop reason: SURG

## 2025-04-29 RX ORDER — MIDAZOLAM HYDROCHLORIDE 1 MG/ML
1 INJECTION, SOLUTION INTRAMUSCULAR; INTRAVENOUS
Status: COMPLETED | OUTPATIENT
Start: 2025-04-29 | End: 2025-04-29

## 2025-04-29 RX ORDER — FENTANYL CITRATE 50 UG/ML
50 INJECTION, SOLUTION INTRAMUSCULAR; INTRAVENOUS
Status: DISCONTINUED | OUTPATIENT
Start: 2025-04-29 | End: 2025-04-29 | Stop reason: HOSPADM

## 2025-04-29 RX ORDER — LIDOCAINE HYDROCHLORIDE 10 MG/ML
0.5 INJECTION, SOLUTION INFILTRATION; PERINEURAL ONCE AS NEEDED
Status: DISCONTINUED | OUTPATIENT
Start: 2025-04-29 | End: 2025-04-29 | Stop reason: HOSPADM

## 2025-04-29 RX ORDER — IPRATROPIUM BROMIDE AND ALBUTEROL SULFATE 2.5; .5 MG/3ML; MG/3ML
3 SOLUTION RESPIRATORY (INHALATION) ONCE AS NEEDED
Status: DISCONTINUED | OUTPATIENT
Start: 2025-04-29 | End: 2025-04-29 | Stop reason: HOSPADM

## 2025-04-29 RX ORDER — FAMOTIDINE 10 MG/ML
20 INJECTION, SOLUTION INTRAVENOUS ONCE
Status: COMPLETED | OUTPATIENT
Start: 2025-04-29 | End: 2025-04-29

## 2025-04-29 RX ORDER — SODIUM CHLORIDE, SODIUM LACTATE, POTASSIUM CHLORIDE, CALCIUM CHLORIDE 600; 310; 30; 20 MG/100ML; MG/100ML; MG/100ML; MG/100ML
9 INJECTION, SOLUTION INTRAVENOUS CONTINUOUS
Status: DISCONTINUED | OUTPATIENT
Start: 2025-04-29 | End: 2025-04-29 | Stop reason: HOSPADM

## 2025-04-29 RX ORDER — NALOXONE HCL 0.4 MG/ML
0.2 VIAL (ML) INJECTION AS NEEDED
Status: DISCONTINUED | OUTPATIENT
Start: 2025-04-29 | End: 2025-04-29 | Stop reason: HOSPADM

## 2025-04-29 RX ORDER — METRONIDAZOLE 500 MG/100ML
500 INJECTION, SOLUTION INTRAVENOUS ONCE
Status: COMPLETED | OUTPATIENT
Start: 2025-04-29 | End: 2025-04-29

## 2025-04-29 RX ORDER — FENTANYL CITRATE 50 UG/ML
INJECTION, SOLUTION INTRAMUSCULAR; INTRAVENOUS AS NEEDED
Status: DISCONTINUED | OUTPATIENT
Start: 2025-04-29 | End: 2025-04-29 | Stop reason: SURG

## 2025-04-29 RX ORDER — HYDROMORPHONE HYDROCHLORIDE 1 MG/ML
0.5 INJECTION, SOLUTION INTRAMUSCULAR; INTRAVENOUS; SUBCUTANEOUS
Status: DISCONTINUED | OUTPATIENT
Start: 2025-04-29 | End: 2025-04-29 | Stop reason: HOSPADM

## 2025-04-29 RX ADMIN — ONDANSETRON 4 MG: 2 INJECTION, SOLUTION INTRAMUSCULAR; INTRAVENOUS at 14:03

## 2025-04-29 RX ADMIN — FAMOTIDINE 20 MG: 10 INJECTION, SOLUTION INTRAVENOUS at 11:49

## 2025-04-29 RX ADMIN — FENTANYL CITRATE 100 MCG: 50 INJECTION, SOLUTION INTRAMUSCULAR; INTRAVENOUS at 13:30

## 2025-04-29 RX ADMIN — METRONIDAZOLE 500 MG: 500 INJECTION, SOLUTION INTRAVENOUS at 13:15

## 2025-04-29 RX ADMIN — MIDAZOLAM 1 MG: 1 INJECTION INTRAMUSCULAR; INTRAVENOUS at 13:07

## 2025-04-29 RX ADMIN — LIDOCAINE HYDROCHLORIDE 100 MG: 20 INJECTION, SOLUTION INFILTRATION; PERINEURAL at 13:30

## 2025-04-29 RX ADMIN — CEFAZOLIN 2000 MG: 2 INJECTION, POWDER, FOR SOLUTION INTRAMUSCULAR; INTRAVENOUS at 13:15

## 2025-04-29 RX ADMIN — MIDAZOLAM 1 MG: 1 INJECTION INTRAMUSCULAR; INTRAVENOUS at 11:49

## 2025-04-29 RX ADMIN — DEXAMETHASONE SODIUM PHOSPHATE 8 MG: 4 INJECTION, SOLUTION INTRA-ARTICULAR; INTRALESIONAL; INTRAMUSCULAR; INTRAVENOUS; SOFT TISSUE at 13:48

## 2025-04-29 RX ADMIN — SODIUM CHLORIDE, POTASSIUM CHLORIDE, SODIUM LACTATE AND CALCIUM CHLORIDE 9 ML/HR: 600; 310; 30; 20 INJECTION, SOLUTION INTRAVENOUS at 11:41

## 2025-04-29 RX ADMIN — ROCURONIUM BROMIDE 35 MG: 10 INJECTION INTRAVENOUS at 13:30

## 2025-04-29 RX ADMIN — PROPOFOL 330 MG: 10 INJECTION, EMULSION INTRAVENOUS at 13:30

## 2025-04-29 NOTE — ANESTHESIA PREPROCEDURE EVALUATION
Anesthesia Evaluation     Patient summary reviewed and Nursing notes reviewed   NPO Solid Status: > 8 hours  NPO Liquid Status: > 4 hours           Airway   Mallampati: II  Neck ROM: full  No difficulty expected  Dental - normal exam     Pulmonary     breath sounds clear to auscultation  (+) a smoker Former, asthma,sleep apnea (stop bang 5)  Cardiovascular     Rhythm: regular    (+) hypertension, dysrhythmias, hyperlipidemia    ROS comment: LBBB    Neuro/Psych  (+) psychiatric history Depression and Anxiety  GI/Hepatic/Renal/Endo    (+) GI bleeding     Musculoskeletal     Abdominal    Substance History   (+) drug use     OB/GYN          Other                      Anesthesia Plan    ASA 2     general     (Prone position discussed)  intravenous induction     Anesthetic plan, risks, benefits, and alternatives have been provided, discussed and informed consent has been obtained with: patient.    CODE STATUS:

## 2025-04-29 NOTE — ANESTHESIA POSTPROCEDURE EVALUATION
Patient: Jermaine Pichardo    Procedure Summary       Date: 04/29/25 Room / Location: [facility] OR [address] [place] [address] MAIN OR    Anesthesia Start: 1325 Anesthesia Stop: 1419    Procedure: HEMORRHOIDECTOMY (Anus) Diagnosis:       Bleeding internal hemorrhoids      (Bleeding internal hemorrhoids [K64.8])    Surgeons: Stefany Hill MD Provider: Yosvany Leroy MD    Anesthesia Type: general ASA Status: 2            Anesthesia Type: general    Vitals  Vitals Value Taken Time   /82 04/29/25 14:45   Temp 36.6 °C (97.9 °F) 04/29/25 14:21   Pulse 78 04/29/25 15:00   Resp 15 04/29/25 14:45   SpO2 100 % 04/29/25 15:00           Anesthesia Post Evaluation

## 2025-04-29 NOTE — ANESTHESIA PROCEDURE NOTES
Airway  Date/Time: 4/29/2025 1:33 PM    General Information and Staff    Patient location during procedure: OR    Indications and Patient Condition  Indications for airway management: airway protection    Mask difficulty assessment: 1 - vent by mask    Final Airway Details    Final airway type: endotracheal airway      Successful airway: ETT   Successful intubation technique: direct laryngoscopy  Adjuncts used in placement: intubating stylet  Endotracheal tube insertion site: oral  Blade: Torres  Blade size: 2  ETT size (mm): 7.5  Cormack-Lehane Classification: grade I - full view of glottis  Placement verified by: chest auscultation, capnometry and single lung ventilation   Measured from: lips  ETT/EBT  to lips (cm): 23  Number of attempts at approach: 1  Assessment: lips, teeth, and gum same as pre-op and atraumatic intubation

## 2025-04-29 NOTE — OP NOTE
Operative Note :  Stefany Hill MD      Jermaine Pichardo  1970    Procedure Date: 04/29/25    Pre-op Diagnosis:  Bleeding internal hemorrhoids [K64.8]    Post-Operative Diagnosis:  Bleeding internal hemorrhoids [K64.8]    Procedure:   Hemorrhoidectomy    Surgeon: Stefany Hill MD    Assistant: Jennifer Del Castillo CSA (Jennifer was responsible for suctioning, retracting, suturing of all surgical incisions, and application of sterile dressings at the completion of the case)    Anesthesia:  General (general endotracheal tube)    Estimated Blood Loss: minimal    Specimens: Internal hemorrhoid    Complications: None    Indications:  The patient is a 55-year-old gentleman who has had bleeding internal hemorrhoids for some time now, refractory to prescription strength hydrocortisone cream.  A colonoscopy was performed last year and showed no other source for gastrointestinal bleeding.  I recommended proceeding with hemorrhoidectomy today.  He has been counseled on the risks of the procedure which include but are not limited to postoperative bleeding, wound infection, fecal incontinence transiently, anal stenosis, and possible hemorrhoid recurrence.  Despite these risks, he has consented to proceed.    Findings: Right posterior engorged and prolapsing internal hemorrhoid column excised    Description of procedure:  The patient was brought to the operating room and intubated in supine positioning.  He was then turned prone on the OR table with his torso supported by large gel rolls, axilla supported by rolled up eggcrate, and arms positioned in the superman position adjacent to his head bilaterally.  The bed was then flexed and the perianal skin prepped and draped in a sterile fashion.  A surgical timeout was completed.  A Lit Altamirano bivalve retractor was inserted into the anus and rectal mucosa inspected circumferentially.  There was an engorged right posterior internal hemorrhoid column but the remainder of the anal mucosa  appeared unremarkable.  The external skin adjacent to the anus in the right posterior region was anesthetized using abundant 0.5% Marcaine mixed with Exparel.  An elliptical incision was made along the external anal skin and the dissection carried around the outer perimeter of the engorged internal hemorrhoid and down along the anal mucosa internally.  The Enseal device was used to excise the entire hemorrhoid pedicle off of the internal sphincter muscle complex which was not violated.  The internal hemorrhoid was passed off to pathology in formalin.  The mucosal and epithelial defect was closed using a running 2-0 Vicryl interlocking whipstitch suture running from the internal to the external corners of the open wound.  At the completion of the case, the wound appeared hemostatic.  Further local anesthetic was injected mixed with Exparel to help with postoperative pain control.  A rolled up piece of Gelfoam was then inserted into the anus with lubrication to help with hemostasis.  He was then transferred to supine positioning and mesh panties were applied with 4 x 4 fluffs over the anus.  He was extubated and transferred to PACU in stable condition with all counts correct per nursing.    Stefany Hill MD  General, Robotic, and Endoscopic Surgery  [facility]    [address], [address]  [place] [address]  P: [phone]  F: [phone]

## 2025-04-30 ENCOUNTER — TELEPHONE (OUTPATIENT)
Dept: SURGERY | Facility: CLINIC | Age: 55
End: 2025-04-30
Payer: COMMERCIAL

## 2025-04-30 LAB
CYTO UR: NORMAL
LAB AP CASE REPORT: NORMAL
PATH REPORT.FINAL DX SPEC: NORMAL
PATH REPORT.GROSS SPEC: NORMAL

## 2025-04-30 NOTE — TELEPHONE ENCOUNTER
Spoke with Aurora and advised her the papers would not be ready today and she would be called when they are completed.

## 2025-04-30 NOTE — TELEPHONE ENCOUNTER
Caller: Aurora Pichardo    Relationship to patient: Emergency Contact    Best call back number: [phone]    Patient is needing: PATIENT'S WIFE CALLED TO CHECK STATUS OF FMLA PAPERWORK THAT WAS DROPPED OFF YESTERDAY 4/29. SHE WANTS DOES NOT KNOW IF THERE IS A FAX # N FORMS AND WOULD LIKE TO PICK THEM UP IF POSS.  SHE WAS ADV THAT PAPERWORK COULD TAKE 7+ DAYS TO BE FILLED OUT. SHE IS JUST WORRIED ABOUT IT BEING COMPLETED IN TIME BC SHE WAS TOLD DR SCHNEIDER WOULD BE ON VACATION. PLEASE ADVISE

## 2025-05-05 ENCOUNTER — TELEPHONE (OUTPATIENT)
Dept: SURGERY | Facility: CLINIC | Age: 55
End: 2025-05-05
Payer: COMMERCIAL

## 2025-05-12 ENCOUNTER — OFFICE VISIT (OUTPATIENT)
Dept: SURGERY | Facility: CLINIC | Age: 55
End: 2025-05-12
Payer: COMMERCIAL

## 2025-05-12 ENCOUNTER — TELEPHONE (OUTPATIENT)
Dept: SURGERY | Facility: CLINIC | Age: 55
End: 2025-05-12
Payer: COMMERCIAL

## 2025-05-12 VITALS
SYSTOLIC BLOOD PRESSURE: 120 MMHG | BODY MASS INDEX: 24.96 KG/M2 | HEART RATE: 83 BPM | HEIGHT: 76 IN | DIASTOLIC BLOOD PRESSURE: 76 MMHG | OXYGEN SATURATION: 98 % | WEIGHT: 205 LBS

## 2025-05-12 DIAGNOSIS — Z09 SURGICAL FOLLOWUP: Primary | ICD-10-CM

## 2025-05-12 PROCEDURE — 99024 POSTOP FOLLOW-UP VISIT: CPT | Performed by: SURGERY

## 2025-05-12 NOTE — TELEPHONE ENCOUNTER
Called pt to see if he wanted to be seen by Dr. Hill. Pt did not answer, left message for pt to call back to schedule.

## 2025-05-12 NOTE — TELEPHONE ENCOUNTER
HEMORRHOIDECTOMY   4/29/25    Pt called with c/o a opening where he had surgery. He said the opening is a quarter of an inch on his rectum. He said there is blood and pus coming out. Pt claims a 5/10 on the pain scale. The opening started this weekend and has gotten bigger. Pt denies any nausea, vomiting or fever. Pt would also like a refill of pain medication.

## 2025-05-12 NOTE — PROGRESS NOTES
CHIEF COMPLAINT:   Chief Complaint   Patient presents with    Wound Check       HISTORY OF PRESENT ILLNESS:  This is a 55 y.o. male who presents for a post-operative visit after undergoing rectal exam under anesthesia with hemorrhoidectomy on 4/29/2025.  He called our office this morning reporting the incision had split open and he has noticed some pus drainage from the open wound.  He says the pus drainage happened only once this morning, but typically it is a mixture of blood and serosanguineous fluid.    Pathology:   Hemorrhoid, internal, hemorrhoidectomy:               A. Squamocolumnar mucosa overlying dilated and congested blood vessels, consistent with hemorrhoid.     PHYSICAL EXAM:  Lungs: Clear  Heart: RRR  ABD: Soft, nontender, nondistended  Ext: no significant edema, calves nontender  Rectum: Right posterior anal incision is open at the external corner, as is normal following hemorrhoidectomy with some exposed granulation tissue and no surrounding cellulitis of the skin with no palpable crepitus, no warmth to the touch, and no expressible purulence    BMI is within normal parameters. No other follow-up for BMI required.    A/P:  This is a 55 y.o. male patient who is S/P rectal exam under anesthesia with hemorrhoidectomy on 4/29/2025    He is healing very well.  I explained that the external corner of the hemorrhoidectomy incision is intentionally left open to allow for passive drainage and minimize the risk of perianal abscess development.  I see no evidence for infection today.  He can continue the same for now and the wound will slowly heal by secondary intention.  We briefly touched on his narcotic pain use, as he has a history of narcotic medication abuse.  He stated that he would like to have a refill today if possible, but I have recommended he transition to 800 mg of ibuprofen every 6 hours and lieu of any further narcotics.  For most patients, beginning about 2 weeks postop the pain starts to  dramatically improve and narcotics are no longer necessary.  Tomorrow would be his 2-week postoperative date.  We can cancel his upcoming appointment on Thursday and I would like to see him back in the office in about 3 weeks for recheck of the wound.  He can call sooner with any questions or concerns.    Stefany Hill MD  General, Robotic, and Endoscopic Surgery  [facility]    [address], [address]  [place] [address]  P: [phone]  F: [phone]

## 2025-06-10 ENCOUNTER — OFFICE VISIT (OUTPATIENT)
Dept: SURGERY | Facility: CLINIC | Age: 55
End: 2025-06-10
Payer: COMMERCIAL

## 2025-06-10 VITALS
SYSTOLIC BLOOD PRESSURE: 136 MMHG | BODY MASS INDEX: 24.7 KG/M2 | HEIGHT: 76 IN | WEIGHT: 202.8 LBS | OXYGEN SATURATION: 99 % | DIASTOLIC BLOOD PRESSURE: 82 MMHG | HEART RATE: 77 BPM

## 2025-06-10 DIAGNOSIS — Z09 SURGICAL FOLLOWUP: Primary | ICD-10-CM

## 2025-06-10 PROCEDURE — 99024 POSTOP FOLLOW-UP VISIT: CPT | Performed by: SURGERY

## 2025-06-10 NOTE — PROGRESS NOTES
CHIEF COMPLAINT:   Chief Complaint   Patient presents with    Post-op Follow-up       HISTORY OF PRESENT ILLNESS:  This is a 55 y.o. male who presents for a post-operative visit after undergoing rectal exam under anesthesia with hemorrhoidectomy on 4/29/2025.  He has been healing very well and has had no real drainage over the last couple of weeks.  He still has mild rectal pain only with defecation but denies any fecal incontinence.  He has been applying Neosporin to the anal skin daily and has noticed a lot of chafing and skin irritation.    Pathology:   Hemorrhoid, internal, hemorrhoidectomy:               A. Squamocolumnar mucosa overlying dilated and congested blood vessels, consistent with hemorrhoid.     PHYSICAL EXAM:  Lungs: Clear  Heart: RRR  ABD: Soft, nontender, nondistended  Ext: no significant edema, calves nontender  Rectum: Right posterior anal incision is almost completely healed with a tiny portion of granulation tissue remaining, the surrounding anal skin is hyperemic and erythematous with what appears to be a maculopapular rash    BMI is within normal parameters. No other follow-up for BMI required.    A/P:  This is a 55 y.o. male patient who is S/P rectal exam under anesthesia with hemorrhoidectomy on 4/29/2025    He is almost completely healed.  I suspect the hyperemia of the anal skin with maculopapular rash is from the Neosporin which I advised him to stop use of immediately.  He does not need to apply any creams or lotions to the skin and only needs to wash it with soap and water daily in the shower.  I suspect over the next 1 to 2 weeks his remaining open wound will completely heal and his pain will resolve.  He can see me back on an as-needed basis.    Stefany Hill MD  General, Robotic, and Endoscopic Surgery  [facility]    [address], [address]  [place] [address]  P: [phone]  F: [phone]

## 2025-06-11 RX ORDER — FLUOXETINE HYDROCHLORIDE 40 MG/1
40 CAPSULE ORAL DAILY
Qty: 90 CAPSULE | Refills: 3 | Status: SHIPPED | OUTPATIENT
Start: 2025-06-11

## 2025-08-11 DIAGNOSIS — F41.8 DEPRESSION WITH ANXIETY: ICD-10-CM

## 2025-08-11 RX ORDER — GABAPENTIN 800 MG/1
800 TABLET ORAL 4 TIMES DAILY
Qty: 120 TABLET | Refills: 5 | Status: SHIPPED | OUTPATIENT
Start: 2025-08-11

## 2025-08-21 RX ORDER — LISINOPRIL 20 MG/1
20 TABLET ORAL DAILY
Qty: 90 TABLET | Refills: 1 | Status: SHIPPED | OUTPATIENT
Start: 2025-08-21

## 2025-08-27 RX ORDER — SILDENAFIL 100 MG/1
100 TABLET, FILM COATED ORAL DAILY PRN
Qty: 30 TABLET | Refills: 0 | Status: SHIPPED | OUTPATIENT
Start: 2025-08-27

## (undated) DEVICE — STPLR SKIN VISISTAT WD 35CT

## (undated) DEVICE — JELLY,LUBE,STERILE,FLIP TOP,TUBE,4-OZ: Brand: MEDLINE

## (undated) DEVICE — TUBING, SUCTION, 1/4" X 10', STRAIGHT: Brand: MEDLINE

## (undated) DEVICE — LOU MINOR PROCEDURE: Brand: MEDLINE INDUSTRIES, INC.

## (undated) DEVICE — GOWN,SIRUS,NON REINFRCD,LARGE,SET IN SL: Brand: MEDLINE

## (undated) DEVICE — NDL HYPO PRECISIONGLIDE REG 25G 1 1/2

## (undated) DEVICE — ANTIBACTERIAL UNDYED BRAIDED (POLYGLACTIN 910), SYNTHETIC ABSORBABLE SUTURE: Brand: COATED VICRYL

## (undated) DEVICE — CANN O2 ETCO2 FITS ALL CONN CO2 SMPL A/ 7IN DISP LF

## (undated) DEVICE — ADAPT CLN BIOGUARD AIR/H2O DISP

## (undated) DEVICE — GLV SURG SENSICARE POLYISPRN W/ALOE PF LF 6.5 GRN STRL

## (undated) DEVICE — KT ORCA ORCAPOD DISP STRL

## (undated) DEVICE — SENSR O2 OXIMAX FNGR A/ 18IN NONSTR

## (undated) DEVICE — SYR LL TP 10ML STRL

## (undated) DEVICE — PATIENT RETURN ELECTRODE, SINGLE-USE, CONTACT QUALITY MONITORING, ADULT, WITH 9FT CORD, FOR PATIENTS WEIGING OVER 33LBS. (15KG): Brand: MEGADYNE

## (undated) DEVICE — GLV SURG BIOGEL LTX PF 6

## (undated) DEVICE — THE SINGLE USE ETRAP – POLYP TRAP IS USED FOR SUCTION RETRIEVAL OF ENDOSCOPICALLY REMOVED POLYPS.: Brand: ETRAP

## (undated) DEVICE — DRAPE,LITHOTOMY,ATTACHED,STERILE: Brand: MEDLINE

## (undated) DEVICE — SKIN PREP TRAY 4 COMPARTM TRAY: Brand: MEDLINE INDUSTRIES, INC.

## (undated) DEVICE — SNAR POLYP SENSATION STDOVL 27 240 BX40

## (undated) DEVICE — SINGLE-USE BIOPSY FORCEPS: Brand: RADIAL JAW 4

## (undated) DEVICE — STRAP STIRUP SLP RNG 19X3.5IN DISP